# Patient Record
Sex: FEMALE | Race: WHITE | Employment: OTHER | URBAN - METROPOLITAN AREA
[De-identification: names, ages, dates, MRNs, and addresses within clinical notes are randomized per-mention and may not be internally consistent; named-entity substitution may affect disease eponyms.]

---

## 2021-11-23 ENCOUNTER — OFFICE VISIT (OUTPATIENT)
Dept: SPORTS MEDICINE | Age: 76
End: 2021-11-23
Payer: MEDICARE

## 2021-11-23 VITALS — TEMPERATURE: 96.7 F | WEIGHT: 200 LBS | HEIGHT: 67 IN | BODY MASS INDEX: 31.39 KG/M2

## 2021-11-23 DIAGNOSIS — M75.82 TENDINITIS OF LEFT ROTATOR CUFF: ICD-10-CM

## 2021-11-23 DIAGNOSIS — M75.42 IMPINGEMENT SYNDROME OF LEFT SHOULDER: Primary | ICD-10-CM

## 2021-11-23 PROCEDURE — G8484 FLU IMMUNIZE NO ADMIN: HCPCS | Performed by: FAMILY MEDICINE

## 2021-11-23 PROCEDURE — 4004F PT TOBACCO SCREEN RCVD TLK: CPT | Performed by: FAMILY MEDICINE

## 2021-11-23 PROCEDURE — 3017F COLORECTAL CA SCREEN DOC REV: CPT | Performed by: FAMILY MEDICINE

## 2021-11-23 PROCEDURE — G8400 PT W/DXA NO RESULTS DOC: HCPCS | Performed by: FAMILY MEDICINE

## 2021-11-23 PROCEDURE — 1090F PRES/ABSN URINE INCON ASSESS: CPT | Performed by: FAMILY MEDICINE

## 2021-11-23 PROCEDURE — 4040F PNEUMOC VAC/ADMIN/RCVD: CPT | Performed by: FAMILY MEDICINE

## 2021-11-23 PROCEDURE — 1123F ACP DISCUSS/DSCN MKR DOCD: CPT | Performed by: FAMILY MEDICINE

## 2021-11-23 PROCEDURE — G8417 CALC BMI ABV UP PARAM F/U: HCPCS | Performed by: FAMILY MEDICINE

## 2021-11-23 PROCEDURE — 99204 OFFICE O/P NEW MOD 45 MIN: CPT | Performed by: FAMILY MEDICINE

## 2021-11-23 PROCEDURE — G8427 DOCREV CUR MEDS BY ELIG CLIN: HCPCS | Performed by: FAMILY MEDICINE

## 2021-11-23 RX ORDER — SIMVASTATIN 20 MG
20 TABLET ORAL DAILY
COMMUNITY
Start: 2021-10-05

## 2021-11-23 RX ORDER — HYDROCHLOROTHIAZIDE 12.5 MG/1
12.5 CAPSULE, GELATIN COATED ORAL DAILY
COMMUNITY
Start: 2021-07-26

## 2021-11-23 RX ORDER — LISINOPRIL AND HYDROCHLOROTHIAZIDE 12.5; 1 MG/1; MG/1
TABLET ORAL
COMMUNITY
Start: 2021-09-07

## 2021-11-23 RX ORDER — AMINO ACIDS/MV,IRON,MIN
TABLET ORAL
COMMUNITY

## 2021-11-23 ASSESSMENT — ENCOUNTER SYMPTOMS
SHORTNESS OF BREATH: 0
BACK PAIN: 0
CONSTIPATION: 0
NAUSEA: 0
DIARRHEA: 0

## 2021-11-23 NOTE — PROGRESS NOTES
[facility]) Physicians  Neurosurgery and Pain [facility]  [address], [address] [facility] , [address] [facility], [place] [address]: [phone]  F: [phone]      Helena Singh  ([birth year])    11/23/2021    Subjective:     Helena Singh is 77 y.o. female who complains today of:    Chief Complaint   Patient presents with    Shoulder Pain     Left Shoulder Pain       HPI     Patient comes in with chief complaint of left shoulder pain she does not recall doing anything still bothering her for about a month or 2 she had a similar issue and the other side and significant help with therapy and is wondering what other options she has she has recently gone through chemotherapy and radiation for non-Hodgkin's lymphoma and since she has had a complete cure  Allergies:  Latex, Enbucrilate, and Other    No past medical history on file. No past surgical history on file. No family history on file. Social History     Socioeconomic History    Marital status: Not on file     Spouse name: Not on file    Number of children: Not on file    Years of education: Not on file    Highest education level: Not on file   Occupational History    Not on file   Tobacco Use    Smoking status: Not on file    Smokeless tobacco: Not on file   Substance and Sexual Activity    Alcohol use: Not on file    Drug use: Not on file    Sexual activity: Not on file   Other Topics Concern    Not on file   Social History Narrative    Not on file     Social Determinants of Health     Financial Resource Strain:     Difficulty of Paying Living Expenses: Not on file   Food Insecurity:     Worried About Running Out of Food in the Last Year: Not on file    Luly of Food in the Last Year: Not on file   Transportation Needs:     Lack of Transportation (Medical): Not on file    Lack of Transportation (Non-Medical):  Not on file   Physical Activity:     Days of Exercise per Week: Not on file    Minutes of Exercise per Session: Not on file   Stress:     Feeling of Stress : Not on file   Social Connections:     Frequency of Communication with Friends and Family: Not on file    Frequency of Social Gatherings with Friends and Family: Not on file    Attends Spiritism Services: Not on file    Active Member of Clubs or Organizations: Not on file    Attends Club or Organization Meetings: Not on file    Marital Status: Not on file   Intimate Partner Violence:     Fear of Current or Ex-Partner: Not on file    Emotionally Abused: Not on file    Physically Abused: Not on file    Sexually Abused: Not on file   Housing Stability:     Unable to Pay for Housing in the Last Year: Not on file    Number of [place] in the Last Year: Not on file    Unstable Housing in the Last Year: Not on file       Current Outpatient Medications on File Prior to Visit   Medication Sig Dispense Refill    lisinopril-hydroCHLOROthiazide (PRINZIDE;ZESTORETIC) 10-12.5 MG per tablet       hydroCHLOROthiazide (MICROZIDE) 12.5 MG capsule Take 12.5 mg by mouth daily      sertraline (ZOLOFT) 50 MG tablet       simvastatin (ZOCOR) 20 MG tablet Take 20 mg by mouth daily      Multiple Vitamins-Minerals (OCUVITE EXTRA) TABS        No current facility-administered medications on file prior to visit. Review of Systems   Constitutional: Negative for fever. HENT: Negative for hearing loss. Respiratory: Negative for shortness of breath. Gastrointestinal: Negative for constipation, diarrhea and nausea. Genitourinary: Negative for difficulty urinating. Musculoskeletal: Negative for back pain and neck pain. Skin: Negative for rash. Neurological: Negative for headaches. Hematological: Does not bruise/bleed easily. Psychiatric/Behavioral: Negative for sleep disturbance.          Objective:     Vitals:  Temp 96.7 °F (35.9 °C) (Infrared)   Ht 5' 7\" (1.702 m)   Wt 200 lb (90.7 kg)   BMI 31.32 kg/m² Pain Score:   5      Physical

## 2021-12-14 ENCOUNTER — OFFICE VISIT (OUTPATIENT)
Dept: SPORTS MEDICINE | Age: 76
End: 2021-12-14
Payer: MEDICARE

## 2021-12-14 VITALS — BODY MASS INDEX: 31.39 KG/M2 | WEIGHT: 200 LBS | HEIGHT: 67 IN | TEMPERATURE: 96.4 F

## 2021-12-14 DIAGNOSIS — M75.42 IMPINGEMENT SYNDROME OF LEFT SHOULDER: Primary | ICD-10-CM

## 2021-12-14 PROCEDURE — G8400 PT W/DXA NO RESULTS DOC: HCPCS | Performed by: FAMILY MEDICINE

## 2021-12-14 PROCEDURE — 1036F TOBACCO NON-USER: CPT | Performed by: FAMILY MEDICINE

## 2021-12-14 PROCEDURE — G8484 FLU IMMUNIZE NO ADMIN: HCPCS | Performed by: FAMILY MEDICINE

## 2021-12-14 PROCEDURE — 99213 OFFICE O/P EST LOW 20 MIN: CPT | Performed by: FAMILY MEDICINE

## 2021-12-14 PROCEDURE — 3017F COLORECTAL CA SCREEN DOC REV: CPT | Performed by: FAMILY MEDICINE

## 2021-12-14 PROCEDURE — 4040F PNEUMOC VAC/ADMIN/RCVD: CPT | Performed by: FAMILY MEDICINE

## 2021-12-14 PROCEDURE — 1090F PRES/ABSN URINE INCON ASSESS: CPT | Performed by: FAMILY MEDICINE

## 2021-12-14 PROCEDURE — 1123F ACP DISCUSS/DSCN MKR DOCD: CPT | Performed by: FAMILY MEDICINE

## 2021-12-14 PROCEDURE — G8427 DOCREV CUR MEDS BY ELIG CLIN: HCPCS | Performed by: FAMILY MEDICINE

## 2021-12-14 PROCEDURE — G8417 CALC BMI ABV UP PARAM F/U: HCPCS | Performed by: FAMILY MEDICINE

## 2021-12-14 RX ORDER — POLYETHYLENE GLYCOL 3350 17 G/17G
POWDER, FOR SOLUTION ORAL
COMMUNITY

## 2021-12-14 RX ORDER — CHLORAL HYDRATE 500 MG
CAPSULE ORAL
COMMUNITY

## 2021-12-14 RX ORDER — CHOLECALCIFEROL (VITAMIN D3) 10(400)/ML
DROPS ORAL
COMMUNITY

## 2021-12-14 ASSESSMENT — ENCOUNTER SYMPTOMS
SHORTNESS OF BREATH: 0
NAUSEA: 0
CONSTIPATION: 0
DIARRHEA: 0
BACK PAIN: 0

## 2021-12-14 NOTE — PROGRESS NOTES
[facility]) Physicians  Neurosurgery and Pain [facility]  [address] [facility] [address] [facility] , [address] [facility], [place] [address]: [phone]  F: [phone]      Carlos Morse  ()    2021    Subjective:     Carlos Morse is 77 y.o. female who complains today of:    Chief Complaint   Patient presents with    Follow-up     Left Shoulder Pain       HPI     Patient returns stating that her shoulder does feel better however it has been a bit delayed in improvement secondary to the fact that she got shingles since last time she saw sauce and it was up into her neck which bothers her shoulder somewhat says despite that her shoulder still seems to be improving she is trying to do her exercises she can do to help it  Allergies:  Latex, Enbucrilate, and Other    Past Medical History:   Diagnosis Date    Arthritis     Cancer ([address]     Depression     Hemorrhoids     Hypertension     Neuropathy      Past Surgical History:   Procedure Laterality Date    JOINT REPLACEMENT      KNEE SURGERY       Family History   Problem Relation Age of Onset    Arthritis Mother     Hypertension Mother     Arthritis Father     Hypertension Father      Social History     Socioeconomic History    Marital status:      Spouse name: Not on file    Number of children: Not on file    Years of education: Not on file    Highest education level: Not on file   Occupational History    Not on file   Tobacco Use    Smoking status: Former Smoker     Years: 3.00     Types: Cigarettes     Start date:      Quit date:      Years since quittin.9    Smokeless tobacco: Never Used   Vaping Use    Vaping Use: Never used   Substance and Sexual Activity    Alcohol use:  Yes     Alcohol/week: 3.0 - 4.0 standard drinks     Types: 3 - 4 Glasses of wine per week    Drug use: Never    Sexual activity: Not on file   Other Topics Concern    Not on file   Social History Narrative    Not on file Social Determinants of Health     Financial Resource Strain:     Difficulty of Paying Living Expenses: Not on file   Food Insecurity:     Worried About Running Out of Food in the Last Year: Not on file    Luly of Food in the Last Year: Not on file   Transportation Needs:     Lack of Transportation (Medical): Not on file    Lack of Transportation (Non-Medical): Not on file   Physical Activity:     Days of Exercise per Week: Not on file    Minutes of Exercise per Session: Not on file   Stress:     Feeling of Stress : Not on file   Social Connections:     Frequency of Communication with Friends and Family: Not on file    Frequency of Social Gatherings with Friends and Family: Not on file    Attends Worship Services: Not on file    Active Member of [address] [place] [address] [employer] or Organizations: Not on file    Attends Club or Organization Meetings: Not on file    Marital Status: Not on file   Intimate Partner Violence:     Fear of Current or Ex-Partner: Not on file    Emotionally Abused: Not on file    Physically Abused: Not on file    Sexually Abused: Not on file   Housing Stability:     Unable to Pay for Housing in the Last Year: Not on file    Number of [place] in the Last Year: Not on file    Unstable Housing in the Last Year: Not on file       Current Outpatient Medications on File Prior to Visit   Medication Sig Dispense Refill    Cholecalciferol (VITAMIN D) 10 MCG/ML LIQD       Omega-3 Fatty Acids (FISH OIL) 1000 MG CAPS       polyethylene glycol (MIRALAX) 17 g packet       lisinopril-hydroCHLOROthiazide (PRINZIDE;ZESTORETIC) 10-12.5 MG per tablet       hydroCHLOROthiazide (MICROZIDE) 12.5 MG capsule Take 12.5 mg by mouth daily      sertraline (ZOLOFT) 50 MG tablet       simvastatin (ZOCOR) 20 MG tablet Take 20 mg by mouth daily      Multiple Vitamins-Minerals (OCUVITE EXTRA) TABS        No current facility-administered medications on file prior to visit.          Review of Systems Constitutional: Negative for fever. HENT: Negative for hearing loss. Respiratory: Negative for shortness of breath. Gastrointestinal: Negative for constipation, diarrhea and nausea. Genitourinary: Negative for difficulty urinating. Musculoskeletal: Negative for back pain and neck pain. Skin: Negative for rash. Neurological: Negative for headaches. Hematological: Does not bruise/bleed easily. Psychiatric/Behavioral: Negative for sleep disturbance. Objective:     Vitals:  Temp 96.4 °F (35.8 °C) (Infrared)   Ht 5' 7\" (1.702 m)   Wt 200 lb (90.7 kg)   BMI 31.32 kg/m² Pain Score:   4      Physical Exam  Vitals reviewed. Constitutional:       Appearance: Normal appearance. Skin:     General: Skin is warm and dry. Neurological:      Mental Status: She is alert. Ortho Exam   Exam of the left shoulder revealed near full range of motion equivocal impingement signs equivocal supraspinatus signs neurovascular muscle status was intact rest the rotator cuff was intact  Assessment:      Diagnosis Orders   1. Impingement syndrome of left shoulder         Plan:   Patient has shown some good improvement despite having the shingles letter continue her exercises to her tolerance and see her back in about 4 weeks       No orders of the defined types were placed in this encounter. No orders of the defined types were placed in this encounter. Follow up:  Return in about 4 weeks (around 1/11/2022).     CADEN JOSEPH DO

## 2022-01-18 ENCOUNTER — OFFICE VISIT (OUTPATIENT)
Dept: SPORTS MEDICINE | Age: 77
End: 2022-01-18
Payer: MEDICARE

## 2022-01-18 VITALS — TEMPERATURE: 96.9 F | WEIGHT: 200 LBS | BODY MASS INDEX: 31.39 KG/M2 | HEIGHT: 67 IN

## 2022-01-18 DIAGNOSIS — M25.372 LEFT ANKLE INSTABILITY: ICD-10-CM

## 2022-01-18 DIAGNOSIS — M75.42 IMPINGEMENT SYNDROME OF LEFT SHOULDER: Primary | ICD-10-CM

## 2022-01-18 DIAGNOSIS — M75.82 TENDINITIS OF LEFT ROTATOR CUFF: ICD-10-CM

## 2022-01-18 PROCEDURE — 4040F PNEUMOC VAC/ADMIN/RCVD: CPT | Performed by: FAMILY MEDICINE

## 2022-01-18 PROCEDURE — 3017F COLORECTAL CA SCREEN DOC REV: CPT | Performed by: FAMILY MEDICINE

## 2022-01-18 PROCEDURE — G8484 FLU IMMUNIZE NO ADMIN: HCPCS | Performed by: FAMILY MEDICINE

## 2022-01-18 PROCEDURE — G8427 DOCREV CUR MEDS BY ELIG CLIN: HCPCS | Performed by: FAMILY MEDICINE

## 2022-01-18 PROCEDURE — 99213 OFFICE O/P EST LOW 20 MIN: CPT | Performed by: FAMILY MEDICINE

## 2022-01-18 PROCEDURE — 1123F ACP DISCUSS/DSCN MKR DOCD: CPT | Performed by: FAMILY MEDICINE

## 2022-01-18 PROCEDURE — G8417 CALC BMI ABV UP PARAM F/U: HCPCS | Performed by: FAMILY MEDICINE

## 2022-01-18 PROCEDURE — 1036F TOBACCO NON-USER: CPT | Performed by: FAMILY MEDICINE

## 2022-01-18 PROCEDURE — 1090F PRES/ABSN URINE INCON ASSESS: CPT | Performed by: FAMILY MEDICINE

## 2022-01-18 PROCEDURE — G8400 PT W/DXA NO RESULTS DOC: HCPCS | Performed by: FAMILY MEDICINE

## 2022-01-18 RX ORDER — OMEPRAZOLE 10 MG/1
20 CAPSULE, DELAYED RELEASE ORAL DAILY
COMMUNITY
Start: 2022-01-10 | End: 2022-02-09

## 2022-01-18 ASSESSMENT — ENCOUNTER SYMPTOMS
BACK PAIN: 0
SHORTNESS OF BREATH: 0
DIARRHEA: 0
CONSTIPATION: 0
NAUSEA: 0

## 2022-01-18 NOTE — PROGRESS NOTES
[facility]) Physicians  Neurosurgery and Pain [facility]  [address], [address] [facility] , [address] [facility], [place] [address]: [phone]  F: [phone]      Zana Gaming  ()    2022    Subjective:     Zana Gaming is 77 y.o. female who complains today of:    Chief Complaint   Patient presents with    Follow-up     Left Shoulder Pain       HPI     Patient returns stating that her left shoulder is doing a lot better however she is having problems with her left ankle she had been prescribed AFOs years ago and she thinks maybe they are not working as well as they were before she also had bilateral total knees done and she thinks it may have thrown off her gait she says she is also had neuropathy from chemotherapy and she is aware it been working on balancing and therapy for the last year or so but still having difficulty  Allergies:  Latex, Enbucrilate, and Other    Past Medical History:   Diagnosis Date    Arthritis     [address] [address]     Depression     Hemorrhoids     Hypertension     Neuropathy      Past Surgical History:   Procedure Laterality Date    JOINT REPLACEMENT      KNEE SURGERY       Family History   Problem Relation Age of Onset    Arthritis Mother     Hypertension Mother     Arthritis Father     Hypertension Father      Social History     Socioeconomic History    Marital status:      Spouse name: Not on file    Number of children: Not on file    Years of education: Not on file    Highest education level: Not on file   Occupational History    Not on file   Tobacco Use    Smoking status: Former Smoker     Packs/day: 0.50     Years: 3.00     Pack years: 1.50     Types: Cigarettes     Start date:      Quit date:      Years since quittin.0    Smokeless tobacco: Never Used   Vaping Use    Vaping Use: Never used   Substance and Sexual Activity    Alcohol use:  Yes     Alcohol/week: 3.0 - 4.0 standard drinks     Types: 3 - 4 Glasses of wine per week    Drug use: Never    Sexual activity: Not on file   Other Topics Concern    Not on file   Social History Narrative    Not on file     Social Determinants of Health     Financial Resource Strain:     Difficulty of Paying Living Expenses: Not on file   Food Insecurity:     Worried About Running Out of Food in the Last Year: Not on file    Luly of Food in the Last Year: Not on file   Transportation Needs:     Lack of Transportation (Medical): Not on file    Lack of Transportation (Non-Medical):  Not on file   Physical Activity:     Days of Exercise per Week: Not on file    Minutes of Exercise per Session: Not on file   Stress:     Feeling of Stress : Not on file   Social Connections:     Frequency of Communication with Friends and Family: Not on file    Frequency of Social Gatherings with Friends and Family: Not on file    Attends Baptism Services: Not on file    Active Member of [address] [place] [address] or Organizations: Not on file    Attends Club or Organization Meetings: Not on file    Marital Status: Not on file   Intimate Partner Violence:     Fear of Current or Ex-Partner: Not on file    Emotionally Abused: Not on file    Physically Abused: Not on file    Sexually Abused: Not on file   Housing Stability:     Unable to Pay for Housing in the Last Year: Not on file    Number of [place] in the Last Year: Not on file    Unstable Housing in the Last Year: Not on file       Current Outpatient Medications on File Prior to Visit   Medication Sig Dispense Refill    omeprazole (PRILOSEC) 10 MG delayed release capsule Take 20 mg by mouth daily      Cholecalciferol (VITAMIN D) 10 MCG/ML LIQD       Omega-3 Fatty Acids (FISH OIL) 1000 MG CAPS       polyethylene glycol (MIRALAX) 17 g packet       lisinopril-hydroCHLOROthiazide (PRINZIDE;ZESTORETIC) 10-12.5 MG per tablet       hydroCHLOROthiazide (MICROZIDE) 12.5 MG capsule Take 12.5 mg by mouth daily      sertraline (ZOLOFT) 50 MG tablet  simvastatin (ZOCOR) 20 MG tablet Take 20 mg by mouth daily      Multiple Vitamins-Minerals (OCUVITE EXTRA) TABS        No current facility-administered medications on file prior to visit. Review of Systems   Constitutional: Negative for fever. HENT: Negative for hearing loss. Respiratory: Negative for shortness of breath. Gastrointestinal: Negative for constipation, diarrhea and nausea. Genitourinary: Negative for difficulty urinating. Musculoskeletal: Negative for back pain and neck pain. Skin: Negative for rash. Neurological: Negative for headaches. Hematological: Does not bruise/bleed easily. Psychiatric/Behavioral: Negative for sleep disturbance. Objective:     Vitals:  Temp 96.9 °F (36.1 °C) (Infrared)   Ht 5' 7\" (1.702 m)   Wt 200 lb (90.7 kg)   BMI 31.32 kg/m² Pain Score:   2      Physical Exam  Vitals reviewed. Constitutional:       Appearance: Normal appearance. Skin:     General: Skin is warm and dry. Neurological:      Mental Status: She is alert. Ortho Exam   Examination of the left shoulder reveals near full range of motion no palpable tenderness rotator cuffs intact negative impingement signs negative [place] sign  Examination of the left ankle reveals some tenderness along the lateral aspect mediocre range of motion neurovascular motor status is intact there is a valgus laxity which is only moderately corrected with her orthotics in place    Assessment:      Diagnosis Orders   1. Impingement syndrome of left shoulder     2. Tendinitis of left rotator cuff     3.  Left ankle instability         Plan:      Regarding the shoulder the patient is doing well and continuing her exercise program  Regarding the ankle sending the patient back to the orthotist to see if they can give her some better correction so take some pressure off the lateral ankle and also help her with her gait she does have a valgus knee on that side which I think is also contributed to the issue so we will start there and also consider lipid therapy depending on how much we can get done with these AFOs may also consider a unloading brace at the knee  No orders of the defined types were placed in this encounter. No orders of the defined types were placed in this encounter. Follow up:  No follow-ups on file.     CADEN JOSEPH, DO

## 2022-01-20 DIAGNOSIS — M25.371 INSTABILITY OF JOINTS OF BOTH ANKLES: Primary | ICD-10-CM

## 2022-01-20 DIAGNOSIS — M25.372 INSTABILITY OF JOINTS OF BOTH ANKLES: Primary | ICD-10-CM

## 2022-02-15 ENCOUNTER — OFFICE VISIT (OUTPATIENT)
Dept: SPORTS MEDICINE | Age: 77
End: 2022-02-15
Payer: MEDICARE

## 2022-02-15 VITALS — WEIGHT: 200 LBS | HEIGHT: 67 IN | BODY MASS INDEX: 31.39 KG/M2 | TEMPERATURE: 96.4 F

## 2022-02-15 DIAGNOSIS — M47.817 DJD (DEGENERATIVE JOINT DISEASE), LUMBOSACRAL: Primary | ICD-10-CM

## 2022-02-15 DIAGNOSIS — S46.011A TRAUMATIC INCOMPLETE TEAR OF RIGHT ROTATOR CUFF, INITIAL ENCOUNTER: ICD-10-CM

## 2022-02-15 PROCEDURE — G8417 CALC BMI ABV UP PARAM F/U: HCPCS | Performed by: FAMILY MEDICINE

## 2022-02-15 PROCEDURE — G8484 FLU IMMUNIZE NO ADMIN: HCPCS | Performed by: FAMILY MEDICINE

## 2022-02-15 PROCEDURE — G8400 PT W/DXA NO RESULTS DOC: HCPCS | Performed by: FAMILY MEDICINE

## 2022-02-15 PROCEDURE — 4040F PNEUMOC VAC/ADMIN/RCVD: CPT | Performed by: FAMILY MEDICINE

## 2022-02-15 PROCEDURE — 1036F TOBACCO NON-USER: CPT | Performed by: FAMILY MEDICINE

## 2022-02-15 PROCEDURE — 99214 OFFICE O/P EST MOD 30 MIN: CPT | Performed by: FAMILY MEDICINE

## 2022-02-15 PROCEDURE — G8427 DOCREV CUR MEDS BY ELIG CLIN: HCPCS | Performed by: FAMILY MEDICINE

## 2022-02-15 PROCEDURE — 3017F COLORECTAL CA SCREEN DOC REV: CPT | Performed by: FAMILY MEDICINE

## 2022-02-15 PROCEDURE — 1090F PRES/ABSN URINE INCON ASSESS: CPT | Performed by: FAMILY MEDICINE

## 2022-02-15 PROCEDURE — 1123F ACP DISCUSS/DSCN MKR DOCD: CPT | Performed by: FAMILY MEDICINE

## 2022-02-15 ASSESSMENT — ENCOUNTER SYMPTOMS
CONSTIPATION: 0
DIARRHEA: 0
NAUSEA: 0
SHORTNESS OF BREATH: 0
BACK PAIN: 0

## 2022-02-15 NOTE — PROGRESS NOTES
[facility]) Physicians  Neurosurgery and Pain [facility]  [address], [address] [facility] , [address] [facility], [place] [address]: [phone]  F: [phone]      Erica Muniz  ()    2/15/2022    Subjective:     Erica Muniz is 77 y.o. female who complains today of:    Chief Complaint   Patient presents with    Follow-up     Bilateral Shoulder Pain - Left Ankle Pain       HPI     Returns stating that she still working on her ankle braces however in the interim she is irritated her shoulders she has been working on the left feels good but the right ones lagging behind she also states that she is feeling some weakness in her back and thinks she is irritated her lumbar spine she feels little weak on the right thigh  Allergies:  Latex, Enbucrilate, and Other    Past Medical History:   Diagnosis Date    Arthritis     Cancer ([address]     Depression     Hemorrhoids     Hypertension     Neuropathy      Past Surgical History:   Procedure Laterality Date    JOINT REPLACEMENT      KNEE SURGERY       Family History   Problem Relation Age of Onset    Arthritis Mother     Hypertension Mother     Arthritis Father     Hypertension Father      Social History     Socioeconomic History    Marital status:      Spouse name: Not on file    Number of children: Not on file    Years of education: Not on file    Highest education level: Not on file   Occupational History    Not on file   Tobacco Use    Smoking status: Former Smoker     Packs/day: 0.50     Years: 3.00     Pack years: 1.50     Types: Cigarettes     Start date:      Quit date:      Years since quittin.1    Smokeless tobacco: Never Used   Vaping Use    Vaping Use: Never used   Substance and Sexual Activity    Alcohol use:  Yes     Alcohol/week: 3.0 - 4.0 standard drinks     Types: 3 - 4 Glasses of wine per week    Drug use: Never    Sexual activity: Not on file   Other Topics Concern    Not on file   Social History Narrative    Not on file     Social Determinants of Health     Financial Resource Strain:     Difficulty of Paying Living Expenses: Not on file   Food Insecurity:     Worried About Running Out of Food in the Last Year: Not on file    Luly of Food in the Last Year: Not on file   Transportation Needs:     Lack of Transportation (Medical): Not on file    Lack of Transportation (Non-Medical):  Not on file   Physical Activity:     Days of Exercise per Week: Not on file    Minutes of Exercise per Session: Not on file   Stress:     Feeling of Stress : Not on file   Social Connections:     Frequency of Communication with Friends and Family: Not on file    Frequency of Social Gatherings with Friends and Family: Not on file    Attends Mu-ism Services: Not on file    Active Member of [address] [place] [address] [employer] or Organizations: Not on file    Attends Club or Organization Meetings: Not on file    Marital Status: Not on file   Intimate Partner Violence:     Fear of Current or Ex-Partner: Not on file    Emotionally Abused: Not on file    Physically Abused: Not on file    Sexually Abused: Not on file   Housing Stability:     Unable to Pay for Housing in the Last Year: Not on file    Number of [place] in the Last Year: Not on file    Unstable Housing in the Last Year: Not on file       Current Outpatient Medications on File Prior to Visit   Medication Sig Dispense Refill    omeprazole (PRILOSEC) 10 MG delayed release capsule Take 20 mg by mouth daily      Cholecalciferol (VITAMIN D) 10 MCG/ML LIQD       Omega-3 Fatty Acids (FISH OIL) 1000 MG CAPS       polyethylene glycol (MIRALAX) 17 g packet       lisinopril-hydroCHLOROthiazide (PRINZIDE;ZESTORETIC) 10-12.5 MG per tablet       hydroCHLOROthiazide (MICROZIDE) 12.5 MG capsule Take 12.5 mg by mouth daily      sertraline (ZOLOFT) 50 MG tablet       simvastatin (ZOCOR) 20 MG tablet Take 20 mg by mouth daily      Multiple Vitamins-Minerals (OCUVITE EXTRA) TABS        No current facility-administered medications on file prior to visit. Review of Systems   Constitutional: Negative for fever. HENT: Negative for hearing loss. Respiratory: Negative for shortness of breath. Gastrointestinal: Negative for constipation, diarrhea and nausea. Genitourinary: Negative for difficulty urinating. Musculoskeletal: Negative for back pain and neck pain. Skin: Negative for rash. Neurological: Negative for headaches. Hematological: Does not bruise/bleed easily. Psychiatric/Behavioral: Negative for sleep disturbance. Objective:     Vitals:  Temp 96.4 °F (35.8 °C) (Infrared)   Ht 5' 7\" (1.702 m)   Wt 200 lb (90.7 kg)   BMI 31.32 kg/m² Pain Score:   3      Physical Exam  Vitals reviewed. Constitutional:       Appearance: Normal appearance. Skin:     General: Skin is warm and dry. Neurological:      Mental Status: She is alert. Ortho Exam   Examination of the lumbar spine with the neurovascular muscular status to be intact other than mild weakness of the right quad patient had near full range of motion no tension signs palpable tenderness L5-S1  Examination of the right shoulder revealed the neurovascular muscular status to be intact there is positive supraspinatus signs near full range of motion positive impingement signs positive McDowell sign    I reviewed x-rays from previous provider    Assessment:      Diagnosis Orders   1. DJD (degenerative joint disease), lumbosacral  Ambulatory referral to Physical Therapy   2.  Traumatic incomplete tear of right rotator cuff, initial encounter  Ambulatory referral to Physical Therapy       Plan:   Regarding the lumbar spine and have the patient get x-rays and started on physical therapy regarding the shoulder when started on therapy for the right I encouraged her to continue her exercises on the left and see her back in 4 weeks if she is no better consider further testing       No orders of the defined types were placed in this encounter. Orders Placed This Encounter   Procedures    Ambulatory referral to Physical Therapy     Referral Priority:   Routine     Referral Type:   Physical Medicine     Referral Reason:   Patient Preference     Requested Specialty:   Physical Therapy     Number of Visits Requested:   1         Follow up:  Return in about 4 weeks (around 3/15/2022).     CADEN JOSEPH DO

## 2022-03-15 ENCOUNTER — OFFICE VISIT (OUTPATIENT)
Dept: SPORTS MEDICINE | Age: 77
End: 2022-03-15
Payer: MEDICARE

## 2022-03-15 VITALS
SYSTOLIC BLOOD PRESSURE: 118 MMHG | TEMPERATURE: 96.8 F | DIASTOLIC BLOOD PRESSURE: 84 MMHG | HEIGHT: 67 IN | WEIGHT: 200 LBS | BODY MASS INDEX: 31.39 KG/M2

## 2022-03-15 DIAGNOSIS — M25.372 LEFT ANKLE INSTABILITY: ICD-10-CM

## 2022-03-15 DIAGNOSIS — M47.817 DJD (DEGENERATIVE JOINT DISEASE), LUMBOSACRAL: Primary | ICD-10-CM

## 2022-03-15 DIAGNOSIS — S46.011A TRAUMATIC INCOMPLETE TEAR OF RIGHT ROTATOR CUFF, INITIAL ENCOUNTER: ICD-10-CM

## 2022-03-15 PROCEDURE — 1123F ACP DISCUSS/DSCN MKR DOCD: CPT | Performed by: FAMILY MEDICINE

## 2022-03-15 PROCEDURE — G8484 FLU IMMUNIZE NO ADMIN: HCPCS | Performed by: FAMILY MEDICINE

## 2022-03-15 PROCEDURE — G8400 PT W/DXA NO RESULTS DOC: HCPCS | Performed by: FAMILY MEDICINE

## 2022-03-15 PROCEDURE — G8427 DOCREV CUR MEDS BY ELIG CLIN: HCPCS | Performed by: FAMILY MEDICINE

## 2022-03-15 PROCEDURE — 99213 OFFICE O/P EST LOW 20 MIN: CPT | Performed by: FAMILY MEDICINE

## 2022-03-15 PROCEDURE — G8417 CALC BMI ABV UP PARAM F/U: HCPCS | Performed by: FAMILY MEDICINE

## 2022-03-15 PROCEDURE — 4040F PNEUMOC VAC/ADMIN/RCVD: CPT | Performed by: FAMILY MEDICINE

## 2022-03-15 PROCEDURE — 1090F PRES/ABSN URINE INCON ASSESS: CPT | Performed by: FAMILY MEDICINE

## 2022-03-15 PROCEDURE — 1036F TOBACCO NON-USER: CPT | Performed by: FAMILY MEDICINE

## 2022-03-15 PROCEDURE — 3017F COLORECTAL CA SCREEN DOC REV: CPT | Performed by: FAMILY MEDICINE

## 2022-03-15 RX ORDER — PHENOL 1.4 %
1 AEROSOL, SPRAY (ML) MUCOUS MEMBRANE 2 TIMES DAILY PRN
COMMUNITY

## 2022-03-15 ASSESSMENT — ENCOUNTER SYMPTOMS
DIARRHEA: 0
SHORTNESS OF BREATH: 0
NAUSEA: 0
CONSTIPATION: 0
BACK PAIN: 0

## 2022-03-15 NOTE — PROGRESS NOTES
[address]  Physicians  Neurosurgery and Pain [place] [id number] [facility], [address] [facility] , [address] [facility], [place]: [phone]  F: [phone]      Kristina Delgado  ([id number])    3/15/2022    Subjective:     Kristina Delgado is 77 y.o. female who complains today of:    Chief Complaint   Patient presents with    Follow-up     Lower Back Pain - PT for Right Shoulder       HPI     Patient returns stating that she is improving her back and shoulder still lagging a bit in the back still waiting for her orthotics to be made for her ankle  Allergies:  Latex, Enbucrilate, and Other    Past Medical History:   Diagnosis Date    Arthritis     Cancer ([facility] [address]     Depression     Hemorrhoids     Hypertension     Neuropathy      Past Surgical History:   Procedure Laterality Date    JOINT REPLACEMENT      KNEE SURGERY       Family History   Problem Relation Age of Onset    Arthritis Mother     Hypertension Mother     Arthritis Father     Hypertension Father      Social History     Socioeconomic History    Marital status:      Spouse name: Not on file    Number of children: Not on file    Years of education: Not on file    Highest education level: Not on file   Occupational History    Not on file   Tobacco Use    Smoking status: Former Smoker     Packs/day: 0.50     Years: 3.00     Pack years: 1.50     Types: Cigarettes     Start date:      Quit date:      Years since quittin.1    Smokeless tobacco: Never Used   Vaping Use    Vaping Use: Never used   Substance and Sexual Activity    Alcohol use:  Yes     Alcohol/week: 3.0 - 4.0 standard drinks     Types: 3 - 4 Glasses of wine per week    Drug use: Never    Sexual activity: Not on file   Other Topics Concern    Not on file   Social History Narrative    Not on file     Social Determinants of Health     Financial Resource Strain:     Difficulty of Paying Living Expenses: Not on file   Food Insecurity:     Worried About [facility] in the Last Year: Not on file    Luly of Food in the Last Year: Not on file   Transportation Needs:     Lack of Transportation (Medical): Not on file    Lack of Transportation (Non-Medical):  Not on file   Physical Activity:     Days of Exercise per Week: Not on file    Minutes of Exercise per Session: Not on file   Stress:     Feeling of Stress : Not on file   Social Connections:     Frequency of Communication with Friends and Family: Not on file    Frequency of Social Gatherings with Friends and Family: Not on file    Attends Yazidi Services: Not on file    Active Member of [address] [place] [address] or Organizations: Not on file    Attends Club or Organization Meetings: Not on file    Marital Status: Not on file   Intimate Partner Violence:     Fear of Current or Ex-Partner: Not on file    Emotionally Abused: Not on file    Physically Abused: Not on file    Sexually Abused: Not on file   Housing Stability:     Unable to Pay for Housing in the Last Year: Not on file    Number of [place] in the Last Year: Not on file    Unstable Housing in the Last Year: Not on file       Current Outpatient Medications on File Prior to Visit   Medication Sig Dispense Refill    calcium carbonate 600 MG TABS tablet Take 1 tablet by mouth 2 times daily as needed      Cholecalciferol (VITAMIN D) 10 MCG/ML LIQD       Omega-3 Fatty Acids (FISH OIL) 1000 MG CAPS       polyethylene glycol (MIRALAX) 17 g packet       lisinopril-hydroCHLOROthiazide (PRINZIDE;ZESTORETIC) 10-12.5 MG per tablet       hydroCHLOROthiazide (MICROZIDE) 12.5 MG capsule Take 12.5 mg by mouth daily      sertraline (ZOLOFT) 50 MG tablet       Multiple Vitamins-Minerals (OCUVITE EXTRA) TABS       omeprazole (PRILOSEC) 10 MG delayed release capsule Take 20 mg by mouth daily      simvastatin (ZOCOR) 20 MG tablet Take 20 mg by mouth daily (Patient not taking: Reported on 3/15/2022)       No current facility-administered medications on file prior to visit. Review of Systems   Constitutional: Negative for fever. HENT: Negative for hearing loss. Respiratory: Negative for shortness of breath. Gastrointestinal: Negative for constipation, diarrhea and nausea. Genitourinary: Negative for difficulty urinating. Musculoskeletal: Negative for back pain and neck pain. Skin: Negative for rash. Neurological: Negative for headaches. Hematological: Does not bruise/bleed easily. Psychiatric/Behavioral: Negative for sleep disturbance. Objective:     Vitals:  /84 (Site: Left Upper Arm)   Temp 96.8 °F (36 °C) (Infrared)   Ht 5' 7\" (1.702 m)   Wt 200 lb (90.7 kg)   BMI 31.32 kg/m² Pain Score:   2      Physical Exam  Vitals reviewed. Constitutional:       Appearance: Normal appearance. Skin:     General: Skin is warm and dry. Neurological:      Mental Status: She is alert. Ortho Exam   Examination of the right shoulder reveals near full range of motion still some equivocal supraspinatus signs neurovascular muscle status is intact rest of the rotator cuff is intact  Examination of the lumbar spine shows near full range of motion no palpable tenderness no tension signs    Assessment:      Diagnosis Orders   1. DJD (degenerative joint disease), lumbosacral     2. Traumatic incomplete tear of right rotator cuff, initial encounter     3. Left ankle instability         Plan:   Patient is improving at a good pace I want her to continue with her therapy and make sure she gets her AFOs made we will see her back for final check in 4 weeks and also assess the AFOs at that point       No orders of the defined types were placed in this encounter. No orders of the defined types were placed in this encounter. Follow up:  Return in about 4 weeks (around 4/12/2022).     CADEN JOSEPH DO

## 2022-03-18 ENCOUNTER — PATIENT MESSAGE (OUTPATIENT)
Dept: SPORTS MEDICINE | Age: 77
End: 2022-03-18

## 2022-03-21 NOTE — TELEPHONE ENCOUNTER
Daniela faxed xray report Lumbar spine to fax # [phone]. Keeps coming back busy, I let patient know to verify fax number.

## 2022-04-12 ENCOUNTER — OFFICE VISIT (OUTPATIENT)
Dept: SPORTS MEDICINE | Age: 77
End: 2022-04-12
Payer: MEDICARE

## 2022-04-12 VITALS
SYSTOLIC BLOOD PRESSURE: 122 MMHG | HEIGHT: 67 IN | WEIGHT: 200 LBS | BODY MASS INDEX: 31.39 KG/M2 | DIASTOLIC BLOOD PRESSURE: 86 MMHG | TEMPERATURE: 97.1 F

## 2022-04-12 DIAGNOSIS — M25.372 LEFT ANKLE INSTABILITY: Primary | ICD-10-CM

## 2022-04-12 DIAGNOSIS — S46.011A TRAUMATIC INCOMPLETE TEAR OF RIGHT ROTATOR CUFF, INITIAL ENCOUNTER: ICD-10-CM

## 2022-04-12 DIAGNOSIS — M47.817 DJD (DEGENERATIVE JOINT DISEASE), LUMBOSACRAL: ICD-10-CM

## 2022-04-12 PROCEDURE — 4040F PNEUMOC VAC/ADMIN/RCVD: CPT | Performed by: FAMILY MEDICINE

## 2022-04-12 PROCEDURE — 3017F COLORECTAL CA SCREEN DOC REV: CPT | Performed by: FAMILY MEDICINE

## 2022-04-12 PROCEDURE — 1090F PRES/ABSN URINE INCON ASSESS: CPT | Performed by: FAMILY MEDICINE

## 2022-04-12 PROCEDURE — 99213 OFFICE O/P EST LOW 20 MIN: CPT | Performed by: FAMILY MEDICINE

## 2022-04-12 PROCEDURE — G8427 DOCREV CUR MEDS BY ELIG CLIN: HCPCS | Performed by: FAMILY MEDICINE

## 2022-04-12 PROCEDURE — G8400 PT W/DXA NO RESULTS DOC: HCPCS | Performed by: FAMILY MEDICINE

## 2022-04-12 PROCEDURE — 1123F ACP DISCUSS/DSCN MKR DOCD: CPT | Performed by: FAMILY MEDICINE

## 2022-04-12 PROCEDURE — 1036F TOBACCO NON-USER: CPT | Performed by: FAMILY MEDICINE

## 2022-04-12 PROCEDURE — G8417 CALC BMI ABV UP PARAM F/U: HCPCS | Performed by: FAMILY MEDICINE

## 2022-04-12 ASSESSMENT — ENCOUNTER SYMPTOMS
CONSTIPATION: 0
DIARRHEA: 0
NAUSEA: 0
BACK PAIN: 0
SHORTNESS OF BREATH: 0

## 2022-05-23 NOTE — TELEPHONE ENCOUNTER
From: Vick Barnett  To: Dr. Ermelinda Mensah  Sent: 3/18/2022 3:40 PM EDT  Subject: Spinal x-ray    Hi Dr. Sofiya Reid,   I spoke with my PCP about the possible gall stone  that showed up on the x-ray. At his recommendation, I have made an appointment with Dr Nelson Martin, a general surgeon, in April. His office asked if you could fax the test results to their office. The fax number is [phone], attention Daisy Rai.     Thank you,   Vick Barnett prone

## 2022-09-13 ENCOUNTER — OFFICE VISIT (OUTPATIENT)
Dept: SPORTS MEDICINE | Age: 77
End: 2022-09-13
Payer: MEDICARE

## 2022-09-13 VITALS — HEIGHT: 67 IN | BODY MASS INDEX: 30.92 KG/M2 | TEMPERATURE: 96.7 F | WEIGHT: 197 LBS

## 2022-09-13 DIAGNOSIS — M25.372 LEFT ANKLE INSTABILITY: Primary | ICD-10-CM

## 2022-09-13 PROCEDURE — 1090F PRES/ABSN URINE INCON ASSESS: CPT | Performed by: FAMILY MEDICINE

## 2022-09-13 PROCEDURE — 99213 OFFICE O/P EST LOW 20 MIN: CPT | Performed by: FAMILY MEDICINE

## 2022-09-13 PROCEDURE — G8417 CALC BMI ABV UP PARAM F/U: HCPCS | Performed by: FAMILY MEDICINE

## 2022-09-13 PROCEDURE — 1123F ACP DISCUSS/DSCN MKR DOCD: CPT | Performed by: FAMILY MEDICINE

## 2022-09-13 PROCEDURE — G8400 PT W/DXA NO RESULTS DOC: HCPCS | Performed by: FAMILY MEDICINE

## 2022-09-13 PROCEDURE — G8427 DOCREV CUR MEDS BY ELIG CLIN: HCPCS | Performed by: FAMILY MEDICINE

## 2022-09-13 PROCEDURE — 1036F TOBACCO NON-USER: CPT | Performed by: FAMILY MEDICINE

## 2022-09-13 ASSESSMENT — ENCOUNTER SYMPTOMS
SHORTNESS OF BREATH: 0
BACK PAIN: 0
CONSTIPATION: 0
NAUSEA: 0
DIARRHEA: 0

## 2022-09-13 NOTE — PROGRESS NOTES
[facility]) Physicians  Neurosurgery and Pain [facility]  [address] [facility], [address] [facility] , [address] [facility], [place]: [phone]  F: [phone]      Galina Jacobson  ([id number])    2022    Subjective:     Galina Jacobson is 69 y.o. female who complains today of:    Chief Complaint   Patient presents with    Follow-up     L Ankle / Foot Pain       HPI     This patient returns stating that she has finally gotten fitted for her new braces but has not gotten them yet so she still having a little pain on the left lateral ankle that gets worse when she walks  Allergies:  Latex, Enbucrilate, and Other    Past Medical History:   Diagnosis Date    Arthritis     Cancer ([facility] [address]     Depression     Hemorrhoids     Hypertension     Neuropathy      Past Surgical History:   Procedure Laterality Date    JOINT REPLACEMENT      KNEE SURGERY       Family History   Problem Relation Age of Onset    Arthritis Mother     Hypertension Mother     Arthritis Father     Hypertension Father      Social History     Socioeconomic History    Marital status:      Spouse name: Not on file    Number of children: Not on file    Years of education: Not on file    Highest education level: Not on file   Occupational History    Not on file   Tobacco Use    Smoking status: Former     Packs/day: 0.50     Years: 3.00     Pack years: 1.50     Types: Cigarettes     Start date:      Quit date:      Years since quittin.7    Smokeless tobacco: Never   Vaping Use    Vaping Use: Never used   Substance and Sexual Activity    Alcohol use:  Yes     Alcohol/week: 3.0 - 4.0 standard drinks     Types: 3 - 4 Glasses of wine per week    Drug use: Never    Sexual activity: Not on file   Other Topics Concern    Not on file   Social History Narrative    Not on file     Social Determinants of Health     Financial Resource Strain: Not on file   Food Insecurity: Not on file   Transportation Needs: Not on file   Physical Activity: Not on file   Stress: Not on file   Social Connections: Not on file   Intimate Partner Violence: Not on file   Housing Stability: Not on file       Current Outpatient Medications on File Prior to Visit   Medication Sig Dispense Refill    calcium carbonate 600 MG TABS tablet Take 1 tablet by mouth 2 times daily as needed      Cholecalciferol (VITAMIN D) 10 MCG/ML LIQD       Omega-3 Fatty Acids (FISH OIL) 1000 MG CAPS       polyethylene glycol (GLYCOLAX) 17 g packet       lisinopril-hydroCHLOROthiazide (PRINZIDE;ZESTORETIC) 10-12.5 MG per tablet       hydroCHLOROthiazide (MICROZIDE) 12.5 MG capsule Take 12.5 mg by mouth daily      sertraline (ZOLOFT) 50 MG tablet       Multiple Vitamins-Minerals (OCUVITE EXTRA) TABS       omeprazole (PRILOSEC) 10 MG delayed release capsule Take 20 mg by mouth daily      simvastatin (ZOCOR) 20 MG tablet Take 20 mg by mouth daily (Patient not taking: No sig reported)       No current facility-administered medications on file prior to visit. Review of Systems   Constitutional:  Negative for fever. HENT:  Negative for hearing loss. Respiratory:  Negative for shortness of breath. Gastrointestinal:  Negative for constipation, diarrhea and nausea. Genitourinary:  Negative for difficulty urinating. Musculoskeletal:  Negative for back pain and neck pain. Skin:  Negative for rash. Neurological:  Negative for headaches. Hematological:  Does not bruise/bleed easily. Psychiatric/Behavioral:  Negative for sleep disturbance. Objective:     Vitals:  Temp (!) 96.7 °F (35.9 °C) (Infrared)   Ht 5' 7\" (1.702 m)   Wt 197 lb (89.4 kg)   BMI 30.85 kg/m² Pain Score:   4      Physical Exam  Vitals reviewed. Constitutional:       Appearance: Normal appearance. Skin:     General: Skin is warm and dry. Neurological:      Mental Status: She is alert.        Ortho Exam   Examination of the left ankle reveals the neurovascular muscular status to be intact tenderness over the lateral ankle joint fair range of motion significant significant pes planus deformity    Assessment:      Diagnosis Orders   1. Left ankle instability            Plan:   I suggested the patient that she try Lidoderm patch on the area until she gets her new AFOs on we will see her back once those are on  This patient needs corrective AFOs for both her left and right ankles due to the fact that she has severe pes planus and is having ankle pain as well as pain in her knees and has difficulty walking. Without these orthotics the next step would be corrective surgery to try to rebuild some type of an arch so that the patient can be rid of her pain in the foot and ankle. These orthotics will need to be custom made since the amount of deformity is beyond any over-the-counter bracing. These the deformities are longstanding and permanent and the braces are needed to try to correct the deformity in order to control patient's mechanics in the ankle and the foot           No orders of the defined types were placed in this encounter. No orders of the defined types were placed in this encounter. Follow up:  Return if symptoms worsen or fail to improve.     CADEN JOSEPH, DO

## 2022-11-22 ENCOUNTER — OFFICE VISIT (OUTPATIENT)
Dept: SPORTS MEDICINE | Age: 77
End: 2022-11-22
Payer: MEDICARE

## 2022-11-22 VITALS
TEMPERATURE: 96.9 F | SYSTOLIC BLOOD PRESSURE: 122 MMHG | BODY MASS INDEX: 31.08 KG/M2 | DIASTOLIC BLOOD PRESSURE: 86 MMHG | HEIGHT: 67 IN | WEIGHT: 198 LBS

## 2022-11-22 DIAGNOSIS — M25.371 INSTABILITY OF JOINTS OF BOTH ANKLES: ICD-10-CM

## 2022-11-22 DIAGNOSIS — M25.372 INSTABILITY OF JOINTS OF BOTH ANKLES: ICD-10-CM

## 2022-11-22 DIAGNOSIS — M25.372 LEFT ANKLE INSTABILITY: Primary | ICD-10-CM

## 2022-11-22 PROCEDURE — G8427 DOCREV CUR MEDS BY ELIG CLIN: HCPCS | Performed by: FAMILY MEDICINE

## 2022-11-22 PROCEDURE — 1036F TOBACCO NON-USER: CPT | Performed by: FAMILY MEDICINE

## 2022-11-22 PROCEDURE — 1123F ACP DISCUSS/DSCN MKR DOCD: CPT | Performed by: FAMILY MEDICINE

## 2022-11-22 PROCEDURE — G8400 PT W/DXA NO RESULTS DOC: HCPCS | Performed by: FAMILY MEDICINE

## 2022-11-22 PROCEDURE — 1090F PRES/ABSN URINE INCON ASSESS: CPT | Performed by: FAMILY MEDICINE

## 2022-11-22 PROCEDURE — G8484 FLU IMMUNIZE NO ADMIN: HCPCS | Performed by: FAMILY MEDICINE

## 2022-11-22 PROCEDURE — 99213 OFFICE O/P EST LOW 20 MIN: CPT | Performed by: FAMILY MEDICINE

## 2022-11-22 PROCEDURE — G8417 CALC BMI ABV UP PARAM F/U: HCPCS | Performed by: FAMILY MEDICINE

## 2022-11-22 ASSESSMENT — ENCOUNTER SYMPTOMS
CONSTIPATION: 0
NAUSEA: 0
SHORTNESS OF BREATH: 0
DIARRHEA: 0
BACK PAIN: 0

## 2022-11-22 NOTE — PROGRESS NOTES
[facility]) Physicians  Neurosurgery and Pain [facility]  [address] [facility], [address] [facility] , [address] [facility], [place] [address]: [phone]  F: [phone]      Kylee Flores  ([phone])    2022    Subjective:     Kylee Flores is 69 y.o. female who complains today of:    Chief Complaint   Patient presents with    Foot Pain    Ankle Pain     left       HPI     Patient returns stating that she is had her AFOs made and she is coming have not checked since she still gets a little bit of wobbliness on the left gets a little ache in the ankle  Allergies:  Latex, Enbucrilate, and Other    Past Medical History:   Diagnosis Date    Arthritis     Cancer ([facility] [address]     Depression     Hemorrhoids     Hypertension     Neuropathy      Past Surgical History:   Procedure Laterality Date    JOINT REPLACEMENT      KNEE SURGERY       Family History   Problem Relation Age of Onset    Arthritis Mother     Hypertension Mother     Arthritis Father     Hypertension Father      Social History     Socioeconomic History    Marital status:      Spouse name: Not on file    Number of children: Not on file    Years of education: Not on file    Highest education level: Not on file   Occupational History    Not on file   Tobacco Use    Smoking status: Former     Packs/day: 0.50     Years: 3.00     Pack years: 1.50     Types: Cigarettes     Start date: 66     Quit date:      Years since quittin.9    Smokeless tobacco: Never   Vaping Use    Vaping Use: Never used   Substance and Sexual Activity    Alcohol use:  Yes     Alcohol/week: 3.0 - 4.0 standard drinks     Types: 3 - 4 Glasses of wine per week    Drug use: Never    Sexual activity: Not on file   Other Topics Concern    Not on file   Social History Narrative    Not on file     Social Determinants of Health     Financial Resource Strain: Not on file   Food Insecurity: Not on file   Transportation Needs: Not on file   Physical Activity: Not on file Stress: Not on file   Social Connections: Not on file   Intimate Partner Violence: Not on file   Housing Stability: Not on file       Current Outpatient Medications on File Prior to Visit   Medication Sig Dispense Refill    calcium carbonate 600 MG TABS tablet Take 1 tablet by mouth 2 times daily as needed      Cholecalciferol (VITAMIN D) 10 MCG/ML LIQD       Omega-3 Fatty Acids (FISH OIL) 1000 MG CAPS       polyethylene glycol (GLYCOLAX) 17 g packet       lisinopril-hydroCHLOROthiazide (PRINZIDE;ZESTORETIC) 10-12.5 MG per tablet       hydroCHLOROthiazide (MICROZIDE) 12.5 MG capsule Take 12.5 mg by mouth daily      sertraline (ZOLOFT) 50 MG tablet       simvastatin (ZOCOR) 20 MG tablet Take 20 mg by mouth daily      Multiple Vitamins-Minerals (OCUVITE EXTRA) TABS       omeprazole (PRILOSEC) 10 MG delayed release capsule Take 20 mg by mouth daily       No current facility-administered medications on file prior to visit. Review of Systems   Constitutional:  Negative for fever. HENT:  Negative for hearing loss. Respiratory:  Negative for shortness of breath. Gastrointestinal:  Negative for constipation, diarrhea and nausea. Genitourinary:  Negative for difficulty urinating. Musculoskeletal:  Negative for back pain and neck pain. Skin:  Negative for rash. Neurological:  Negative for headaches. Hematological:  Does not bruise/bleed easily. Psychiatric/Behavioral:  Negative for sleep disturbance. Objective:     Vitals:  /86   Temp 96.9 °F (36.1 °C)   Ht 5' 7\" (1.702 m)   Wt 198 lb (89.8 kg)   BMI 31.01 kg/m² Pain Score:   0 - No pain      Physical Exam  Vitals reviewed. Constitutional:       Appearance: Normal appearance. Skin:     General: Skin is warm and dry. Neurological:      Mental Status: She is alert.        Ortho Exam   Examination of the ankles bilaterally revealed neurovascular status to be intact bilateral pes planus no palpable tenderness no edema or effusion there appeared to be a good fit on the right the left appear to fit well however there is still the tibial a little bit of increase in the supination portion    Assessment:      Diagnosis Orders   1. Left ankle instability        2. Instability of joints of both ankles            Plan:   1 have the patient return to see if she can get a little better supination on the left I told her to make sure that she is comfortable in them and she can walk in them and that should be more the determinant than making the braces look good however I think this should help her get rid of some of that ankle pain she has left she is to return as needed       No orders of the defined types were placed in this encounter. No orders of the defined types were placed in this encounter. Follow up:  Return if symptoms worsen or fail to improve.     CADEN JOSEPH, DO

## 2023-11-29 ENCOUNTER — APPOINTMENT (OUTPATIENT)
Dept: PHYSICAL THERAPY | Facility: CLINIC | Age: 78
End: 2023-11-29
Payer: MEDICARE

## 2023-12-06 ENCOUNTER — EVALUATION (OUTPATIENT)
Dept: PHYSICAL THERAPY | Facility: CLINIC | Age: 78
End: 2023-12-06
Payer: MEDICARE

## 2023-12-06 DIAGNOSIS — G83.14: ICD-10-CM

## 2023-12-06 DIAGNOSIS — R26.89 BALANCE PROBLEM: Primary | ICD-10-CM

## 2023-12-06 DIAGNOSIS — G83.10: ICD-10-CM

## 2023-12-06 DIAGNOSIS — R26.2 DIFFICULTY WALKING: ICD-10-CM

## 2023-12-06 PROCEDURE — 97163 PT EVAL HIGH COMPLEX 45 MIN: CPT | Mod: GP

## 2023-12-06 ASSESSMENT — ENCOUNTER SYMPTOMS
OCCASIONAL FEELINGS OF UNSTEADINESS: 1
DEPRESSION: 1
LOSS OF SENSATION IN FEET: 1

## 2023-12-06 NOTE — PROGRESS NOTES
Physical Therapy Evaluation:  Neuro    Patient Name:  Zena Baxter   MRN:  [id number]   Date of Evaluation:  12/06/23   Time Calculation  Start Time: 1412  Stop Time: 1457  Time Calculation (min): 45 min  Visit Number:  1 (22 of the year)  Insurance Information:  2023  90 DAY 3/6/2024 $1322.25 USED     1. Balance problem  Follow Up In Physical Therapy      2. 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 Difficulty walking  Follow Up In Physical Therapy          Assessment: Zena Baxter is a 78 year old female referred to outpatient PT for imbalance and LE weakness.  At this time, patient is at a significant risk of falls, likely influenced by profound hip abductor weakness and sensory stocking-pattern deficits in B LE.  As a result, patient would benefit from regular, intensive balance and strength training.  Patient was also encouraged to return to regular gym / independent exercise to supplement.  Based on patient's examination, concurrent co-morbidities, and presentation, patient's level of complexity is High.  As a result, recommending continued PT services to facilitate improvement in CLOF.    Problems include:  Activity limitations, Aerobic capacity / endurance, Balance, Coordination, Decreased functional level, Decreased knowledge of HEP, Fall risk, Gait / locomotion, Sensory, Strength, and Transfers    Goals:  By Discharge patient will demo:  [place] in HEP  No falls during POC  Improvement in the following outcome measures:  - 5xSTS < 15 seconds without Ues  - 6MWT 360m  - Gait Speed 1.0m/s  - Short Form Gavin 23/28  - LEFS improvement by 9pt to match MCID    Potential to achieve rehab goals is fair.    Plan:  1 times every week for a total of 10 visits.  Re-assessment after that time.    Activities that may be performed include but are not limited to:  balance, gait, transfers, modalities (as appropriate),  "e-stim (as appropriate), canalith repositioning maneuvers, therapeutic exercise, therapeutic activities.    Zena Baxter in agreement with and understanding of all discussed this date.    ----------------------------------  ----------------------------------    Subjective:    Onset Date:  2020    HPI:  Zena Baxter is a 78 year old female referred to outpatient PT for weakness and balance problems.  Patient is known to this therapist from previous POC.  Patient has not been seen since 7/19/23 d/t factors within patient's personal life.  Reports still having relatively frequent falls.      Patient Goal:  \"stop falling, get my balance bank\"     Precautions: high fall risk    [place] Fall Risk  One or more falls in the last year? Yes  How many Times? 2 or more  Was the patient injured in the fall? Yes  Has trouble stepping onto curb? Yes  Advised to use a cane or walker to get around safely? Yes  Often has to rush to toilet? No  Feels unsteady when walking? Yes  Has lost some feeling in feet? Yes  Often feels sad or depressed? Yes  Steadies self on furniture while walking at home? Yes  Takes medicine that makes them feel lightheaded or more tired than usual? No  Worried about Falling? Yes  Takes medicine to sleep or improve mood? Yes  Needs to push with hands when rising from a chair? Yes    Pain:  none    ----------------------------------  ----------------------------------    OBJECTIVE    Observation: Gait with SPC, B articulating AFOs donned    ROM: B LE ROM WFL    Strength:     LE Strength Screening:   RLE LLE   Hip Flex 4/5 4/5   Hip ABD 2/5 2/5   Hip ADD 5/5 5/5   Knee Ext 5/5 5/5   Knee Flex 5/5 5/5   Ankle DF 3+/5 3+/5   Ankle PF 4/5 4/5      Sensation:  stocking pattern loss over B LEs    Functional Mobility Assessment:  - Gait: slow pace, trendelenberg bilateral, path deviations, B toe out, decreased floor clearance, variable B foot placement at times with crossing of steps  - Transfers:  requires B UE " to rise after multiple attempts without UE, demoing multidirectional instability / imbalance upon rising, at times retropulsive  - Bed Mobility:  mildly effortful    Outcomes:  5xSTS 16 with UES  2MWT:  332ft / 101m  Gait Speed:  .84m/s  Short Form RUBIO:  15/28  LEFS:  47/80    ----------------------------------  ----------------------------------

## 2024-01-02 ENCOUNTER — TREATMENT (OUTPATIENT)
Dept: PHYSICAL THERAPY | Facility: CLINIC | Age: 79
End: 2024-01-02
Payer: MEDICARE

## 2024-01-02 DIAGNOSIS — R26.89 BALANCE PROBLEM: Primary | ICD-10-CM

## 2024-01-02 DIAGNOSIS — G83.10: ICD-10-CM

## 2024-01-02 DIAGNOSIS — R26.2 DIFFICULTY WALKING: ICD-10-CM

## 2024-01-02 PROCEDURE — 97112 NEUROMUSCULAR REEDUCATION: CPT | Mod: GP,KX

## 2024-01-02 NOTE — PROGRESS NOTES
"Physical Therapy Treatment      Patient Name: Zena Baxter  MRN: [id number]  Today's Date: 1/2/2024  Visit #: 2  Insurance:  90 DAY 3/6/2024 $1322.25 USED   Time Calculation  Start Time: 1401  Stop Time: 1444  Time Calculation (min): 43 min    1. Balance problem  Follow Up In Physical Therapy      2. 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 Difficulty walking  Follow Up In Physical Therapy          ASSESSMENT:  Good participation throughout.  Highly challenged by activities and fatigue.  No adverse events.    GOALS:  [place] in HEP  No falls during POC  Improvement in the following outcome measures:  - 5xSTS < 15 seconds without Ues  - 6MWT 360m  - Gait Speed 1.0m/s  - Short Form Gavin 23/28  - LEFS improvement by 9pt to match MCID    PLAN:  BFR, dynamic balance    SUBJECTIVE:  \"Not worth two hoots and a holler.\"  Really enjoyed having granddaughter around.    OBJECTIVE:    TREATMENT:  Neuromuscular Re-education (00356): 44 minutes    R LE BFR:  - LOP 212mmHg  - PTP 170mmHg    Straight Leg Raise:  30 reps, 2 sets x 10 reps, then x 10 from hooklying position  Clams:  30 reps, 3 sets x 10 reps  B-Stance Bridge:  30 reps, 3 sets x 10 reps  STS:  3 sets x 8  "

## 2024-01-08 ENCOUNTER — TREATMENT (OUTPATIENT)
Dept: PHYSICAL THERAPY | Facility: CLINIC | Age: 79
End: 2024-01-08
Payer: MEDICARE

## 2024-01-08 DIAGNOSIS — G83.10: ICD-10-CM

## 2024-01-08 DIAGNOSIS — G83.10 MONOPLEGIA OF LOWER EXTREMITY, UNSPECIFIED ETIOLOGY, UNSPECIFIED LATERALITY (MULTI): ICD-10-CM

## 2024-01-08 DIAGNOSIS — R26.89 BALANCE PROBLEM: Primary | ICD-10-CM

## 2024-01-08 DIAGNOSIS — R26.2 DIFFICULTY WALKING: ICD-10-CM

## 2024-01-08 PROCEDURE — 97112 NEUROMUSCULAR REEDUCATION: CPT | Mod: GP

## 2024-01-08 NOTE — PROGRESS NOTES
Physical Therapy Treatment      Patient Name: Zena Baxter  MRN: [id number]  Today's Date: 1/8/2024  Visit #: 3  Insurance:  90 DAY 3/6/2024 $1322.25 USED   Time Calculation  Start Time: 1139  Stop Time: 1223  Time Calculation (min): 44 min    1. Balance problem  Follow Up In Physical Therapy      2. 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 Difficulty walking  Follow Up In Physical Therapy      4. Monoplegia of lower extremity, unspecified etiology, unspecified laterality (CMS/HCC)            ASSESSMENT:  Good participation throughout.  Highly challenged by activities and fatigue.  No adverse events.    GOALS:  [place] in HEP  No falls during POC  Improvement in the following outcome measures:  - 5xSTS < 15 seconds without Ues  - 6MWT 360m  - Gait Speed 1.0m/s  - Short Form Gavin 23/28  - LEFS improvement by 9pt to match MCID    PLAN:  BFR, dynamic balance    SUBJECTIVE:  Was sore after last time for a couple days.    OBJECTIVE:    TREATMENT:  Neuromuscular Re-education (51890): 44 minutes    R LE BFR:  - LOP 153mmHg  - PTP 122mmHg    Straight Leg Raise:  30 reps, 3 sets x 15 (from hooklying position)  Clams:  30 reps, 3 sets x 10 reps  B-Stance Bridge:  30 reps, 3 sets x 10 reps  STS:  4 sets x 10 (increased)

## 2024-01-10 ENCOUNTER — APPOINTMENT (OUTPATIENT)
Dept: PHYSICAL THERAPY | Facility: CLINIC | Age: 79
End: 2024-01-10
Payer: MEDICARE

## 2024-01-16 ENCOUNTER — TREATMENT (OUTPATIENT)
Dept: PHYSICAL THERAPY | Facility: CLINIC | Age: 79
End: 2024-01-16
Payer: MEDICARE

## 2024-01-16 DIAGNOSIS — R26.2 DIFFICULTY WALKING: ICD-10-CM

## 2024-01-16 DIAGNOSIS — G83.10: ICD-10-CM

## 2024-01-16 DIAGNOSIS — R26.89 BALANCE PROBLEM: Primary | ICD-10-CM

## 2024-01-16 PROCEDURE — 97112 NEUROMUSCULAR REEDUCATION: CPT | Mod: GP

## 2024-01-16 NOTE — PROGRESS NOTES
Physical Therapy Treatment      Patient Name: Zena Baxter  MRN: [id number]  Today's Date: 1/16/2024  Visit #: 4  Insurance:  90 DAY 3/6/2024 $1322.25 USED   Time Calculation  Start Time: 1120  Stop Time: 1200  Time Calculation (min): 40 min    1. Balance problem  Follow Up In Physical Therapy      2. 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 Difficulty walking  Follow Up In Physical Therapy            ASSESSMENT:  Good participation throughout.  Highly challenged by activities and fatigue.  No adverse events.    GOALS:  [place] in HEP  No falls during POC  Improvement in the following outcome measures:  - 5xSTS < 15 seconds without Ues  - 6MWT 360m  - Gait Speed 1.0m/s  - Short Form Gavin 23/28  - LEFS improvement by 9pt to match MCID    PLAN:  BFR, dynamic balance    SUBJECTIVE:  Not as sore after this last time.    OBJECTIVE:    TREATMENT:  Neuromuscular Re-education (14134): 44 minutes    R LE BFR:  - LOP 219mmHg  - PTP 175mmHg    Clams:  30 reps, 3 sets x 10 reps  B-Stance Bridge:  30 reps, 3 sets x 10 reps  STS:  4 sets x 10 (maintained)    BlazePods:  - Foot Taps -- 25 targets -- use of SPC for steadying

## 2024-01-23 ENCOUNTER — TREATMENT (OUTPATIENT)
Dept: PHYSICAL THERAPY | Facility: CLINIC | Age: 79
End: 2024-01-23
Payer: MEDICARE

## 2024-01-23 DIAGNOSIS — R26.89 BALANCE PROBLEM: ICD-10-CM

## 2024-01-23 DIAGNOSIS — G83.10 MONOPLEGIA OF LOWER EXTREMITY, UNSPECIFIED ETIOLOGY, UNSPECIFIED LATERALITY (MULTI): Primary | ICD-10-CM

## 2024-01-23 DIAGNOSIS — R26.2 DIFFICULTY WALKING: ICD-10-CM

## 2024-01-23 DIAGNOSIS — G83.10: ICD-10-CM

## 2024-01-23 PROCEDURE — 97112 NEUROMUSCULAR REEDUCATION: CPT | Mod: GP

## 2024-01-23 NOTE — PROGRESS NOTES
Physical Therapy Treatment      Patient Name: Zena Baxter  MRN: [id number]  Today's Date: 2024  Visit #: 6  Insurance:  90 DAY 3/6/2024 $1322.25 USED   Time Calculation  Start Time: 1135  Stop Time: 1217  Time Calculation (min): 42 min    1. Monoplegia of lower limb affecting unspecified side (CMS/HCC)  Follow Up In Physical Therapy      2. Balance problem  Follow Up In Physical Therapy      3. Difficulty walking  Follow Up In Physical Therapy              ASSESSMENT:  Good participation throughout.  Highly challenged by activities and fatigue.  No adverse events.    GOALS:  [place] in HEP  No falls during POC  Improvement in the following outcome measures:  - 5xSTS < 15 seconds without Ues  - 6MWT 360m  - Gait Speed 1.0m/s  - Short Form Gavin 23/28  - LEFS improvement by 9pt to match MCID    PLAN:  BFR, dynamic balance    SUBJECTIVE:  Not as sore after this last time.    OBJECTIVE:    TREATMENT:  Neuromuscular Re-education (23074): 42 minutes    R LE BFR:  - LOP 178mmHg  - PTP 142mmHg    Clams:  30 reps, 3 sets x 15 reps  B-Stance Bridge:  30 reps, 3 sets x 15 reps  Standing Sidesteppin reps, 3 sets x 15 reps    ^^ challenged by all of the above, fatiguing

## 2024-01-30 ENCOUNTER — TREATMENT (OUTPATIENT)
Dept: PHYSICAL THERAPY | Facility: CLINIC | Age: 79
End: 2024-01-30
Payer: MEDICARE

## 2024-01-30 DIAGNOSIS — G83.10 MONOPLEGIA OF LOWER EXTREMITY, UNSPECIFIED ETIOLOGY, UNSPECIFIED LATERALITY (MULTI): ICD-10-CM

## 2024-01-30 DIAGNOSIS — G83.10: ICD-10-CM

## 2024-01-30 DIAGNOSIS — R26.89 BALANCE PROBLEM: Primary | ICD-10-CM

## 2024-01-30 DIAGNOSIS — R26.2 DIFFICULTY WALKING: ICD-10-CM

## 2024-01-30 PROCEDURE — 97112 NEUROMUSCULAR REEDUCATION: CPT | Mod: GP

## 2024-01-30 NOTE — PROGRESS NOTES
Physical Therapy Treatment      Patient Name: Zena Baxter  MRN: [id number]  Today's Date: 1/30/2024  Visit #: 7  Insurance:  90 DAY 3/6/2024 $1322.25 USED   Time Calculation  Start Time: 1145  Stop Time: 1212  Time Calculation (min): 27 min    1. Balance problem  Follow Up In Physical Therapy      2. Difficulty walking  Follow Up In Physical Therapy      3. Monoplegia of lower limb affecting unspecified side (CMS/HCC)  Follow Up In Physical Therapy      4. Monoplegia of lower extremity, unspecified etiology, unspecified laterality (CMS/HCC) [G83.10]                  ASSESSMENT:  Good participation throughout.  Highly challenged by activities and fatigue.  No adverse events.    GOALS:  [place] in HEP  No falls during POC  Improvement in the following outcome measures:  - 5xSTS < 15 seconds without Ues  - 6MWT 360m  - Gait Speed 1.0m/s  - Short Form Gavin 23/28  - LEFS improvement by 9pt to match MCID    PLAN:  BFR, dynamic balance    SUBJECTIVE:  Apologetic for running late.  Had difficulties getting out the house.    OBJECTIVE:    TREATMENT:  Neuromuscular Re-education (85133): 42 minutes    R LE BFR:  - LOP 194mmHg  - PTP 155mmHg    Clams:  30 reps, 3 sets x 15 reps  B-Stance Bridge:  30 reps, 3 sets x 15 reps  Sit to Stands:  3 sets x 10  ^^ challenged by all of the above, fatiguing

## 2024-02-06 ENCOUNTER — TREATMENT (OUTPATIENT)
Dept: PHYSICAL THERAPY | Facility: CLINIC | Age: 79
End: 2024-02-06
Payer: MEDICARE

## 2024-02-06 DIAGNOSIS — G83.10: ICD-10-CM

## 2024-02-06 DIAGNOSIS — R26.2 DIFFICULTY WALKING: ICD-10-CM

## 2024-02-06 DIAGNOSIS — G83.11: ICD-10-CM

## 2024-02-06 DIAGNOSIS — R26.89 BALANCE PROBLEM: Primary | ICD-10-CM

## 2024-02-06 PROCEDURE — 97112 NEUROMUSCULAR REEDUCATION: CPT | Mod: GP

## 2024-02-06 NOTE — PROGRESS NOTES
Physical Therapy Treatment      Patient Name: Zena Baxter  MRN: [id number]  Today's Date: 2024  Visit #: 8  Insurance:  90 DAY 3/6/2024 $1322.25 USED   Time Calculation  Start Time: 1139  Stop Time: 1210  Time Calculation (min): 31 min    1. Balance problem  Follow Up In Physical Therapy      2. Difficulty walking  Follow Up In Physical Therapy      3. Monoplegia of lower limb affecting unspecified side (CMS/HCC)  Follow Up In Physical Therapy      4. 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  Highly challenged by activities and fatigue.  No adverse events.  Will be ready to progress along activities.    GOALS:  [place] in HEP  No falls during POC  Improvement in the following outcome measures:  - 5xSTS < 15 seconds without Ues  - 6MWT 360m  - Gait Speed 1.0m/s  - Short Form Gavin 23/28  - LEFS improvement by 9pt to match MCID    PLAN:  BFR, dynamic balance    SUBJECTIVE:  Apologetic for running late again.      OBJECTIVE:    TREATMENT:  Neuromuscular Re-education (76685): 31 minutes    R LE BFR:  - LOP 180mmHg  - PTP 144mmHg    Clams:  30 reps, 3 sets x 15 reps  B-Stance Bridge:  30 reps, 3 sets x 15 reps  Sidesteppin reps, 3 sets x 15 reps  ^^ challenged by all of the above, fatiguing

## 2024-02-13 ENCOUNTER — TREATMENT (OUTPATIENT)
Dept: PHYSICAL THERAPY | Facility: CLINIC | Age: 79
End: 2024-02-13
Payer: MEDICARE

## 2024-02-13 DIAGNOSIS — R26.89 BALANCE PROBLEM: ICD-10-CM

## 2024-02-13 DIAGNOSIS — R26.2 DIFFICULTY WALKING: ICD-10-CM

## 2024-02-13 DIAGNOSIS — G83.10 MONOPLEGIA OF LOWER EXTREMITY, UNSPECIFIED ETIOLOGY, UNSPECIFIED LATERALITY (MULTI): Primary | ICD-10-CM

## 2024-02-13 DIAGNOSIS — G83.10: ICD-10-CM

## 2024-02-13 PROCEDURE — 97112 NEUROMUSCULAR REEDUCATION: CPT | Mod: GP

## 2024-02-13 NOTE — PROGRESS NOTES
Physical Therapy Treatment      Patient Name: Zena Baxter  MRN: [id number]  Today's Date: 2024  Visit #: 9  Insurance:  90 DAY 3/6/2024 $1322.25 USED   Time Calculation  Start Time: 1130  Stop Time: 1210  Time Calculation (min): 40 min    1. Monoplegia of lower extremity, unspecified etiology, unspecified laterality (CMS/HCC)        2. 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 Balance problem  Follow Up In Physical Therapy      4. Difficulty walking  Follow Up In Physical Therapy                  ASSESSMENT:  Good participation throughout.  Highly challenged by activities and fatigue.  No adverse events.  Able to progress activities slightly this date.    GOALS:  [place] in HEP  No falls during POC  Improvement in the following outcome measures:  - 5xSTS < 15 seconds without Ues  - 6MWT 360m  - Gait Speed 1.0m/s  - Short Form Gavin 23/28  - LEFS improvement by 9pt to match MCID    PLAN:  Re-assessment    SUBJECTIVE:  Had a LOB with near fall but sat in large flower pot.   No injury.    OBJECTIVE:    TREATMENT:  Neuromuscular Re-education (72155): 40 minutes    R LE BFR:  - LOP 182mmHg  - PTP 146mmHg    Clams:  30 reps, 3 sets x 15 reps with 10lbs  B-Stance Bridge:  30 reps, 3 sets x 15 reps  Sidesteppin reps, 3 sets x 15 reps  ^^ challenged by all of the above, fatiguing

## 2024-02-20 ENCOUNTER — TREATMENT (OUTPATIENT)
Dept: PHYSICAL THERAPY | Facility: CLINIC | Age: 79
End: 2024-02-20
Payer: MEDICARE

## 2024-02-20 DIAGNOSIS — G83.10: ICD-10-CM

## 2024-02-20 DIAGNOSIS — R26.2 DIFFICULTY WALKING: ICD-10-CM

## 2024-02-20 DIAGNOSIS — R26.89 BALANCE PROBLEM: ICD-10-CM

## 2024-02-20 DIAGNOSIS — G83.11: Primary | ICD-10-CM

## 2024-02-20 PROCEDURE — 97112 NEUROMUSCULAR REEDUCATION: CPT | Mod: GP

## 2024-02-20 NOTE — PROGRESS NOTES
Physical Therapy Treatment      Patient Name: Zena Baxter  MRN: [id number]  Today's Date: 2024  Visit #: 10  Insurance:  90 DAY 3/6/2024 $1322.25 USED   Time Calculation  Start Time: 1120  Stop Time: 1200  Time Calculation (min): 40 min    1. 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 Balance problem  Follow Up In Physical Therapy      4. Difficulty walking  Follow Up In Physical Therapy                  ASSESSMENT:  Good participation throughout.  Highly challenged by activities and fatigue.  Still demos significant imbalance in between activities, necessitating continued PT services.  Will be re-testing next session.    GOALS:  [place] in HEP  No falls during POC  Improvement in the following outcome measures:  - 5xSTS < 15 seconds without Ues  - 6MWT 360m  - Gait Speed 1.0m/s  - Short Form Gavin 23/28  - LEFS improvement by 9pt to match MCID    PLAN:  Re-assessment    SUBJECTIVE:  No new falls.  Tired today    OBJECTIVE:    TREATMENT:  Neuromuscular Re-education (51258): 40 minutes    R LE BFR:  - LOP 194mmHg  - PTP 155mmHg    Clams:  30 reps, 3 sets x 15 reps with 10lbs  B-Stance Bridge:  30 reps, 3 sets x 15 reps  Sidesteppin reps, 3 sets x 15 reps  ^^ challenged by all of the above, fatiguing

## 2024-02-27 ENCOUNTER — TREATMENT (OUTPATIENT)
Dept: PHYSICAL THERAPY | Facility: CLINIC | Age: 79
End: 2024-02-27
Payer: MEDICARE

## 2024-02-27 DIAGNOSIS — G83.11: ICD-10-CM

## 2024-02-27 DIAGNOSIS — R26.89 BALANCE PROBLEM: ICD-10-CM

## 2024-02-27 DIAGNOSIS — R26.2 DIFFICULTY WALKING: ICD-10-CM

## 2024-02-27 DIAGNOSIS — G83.10: Primary | ICD-10-CM

## 2024-02-27 PROCEDURE — 97530 THERAPEUTIC ACTIVITIES: CPT | Mod: GP

## 2024-02-27 NOTE — PROGRESS NOTES
Physical Therapy Treatment and Reassessment     Patient Name: Zena Baxter  MRN: [id number]  Today's Date: 2024  Visit #: 11  Insurance:  90 DAY 3/6/2024 $1322.25 USED   Time Calculation  Start Time: 1134  Stop Time: 1213  Time Calculation (min): 39 min    1. Monoplegia of lower limb affecting unspecified side (CMS/HCC)  Follow Up In Physical Therapy      2. Balance problem  Follow Up In Physical Therapy      3. Difficulty walking  Follow Up In Physical Therapy      4. 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  She has shown improvements, however, since initial evaluation which is promising.  Given HEP this date.    GOALS:  [place] in HEP  No falls during POC  Improvement in the following outcome measures:  - 5xSTS < 15 seconds without Ues  - 6MWT 360m  - Gait Speed 1.0m/s  - Short Form Gavin   - LEFS improvement by 9pt to match MCID  ^^ unmet but improving    PLAN:  Continue with PT POC.    SUBJECTIVE:  Fatigued overall from moving furniture    OBJECTIVE:    TREATMENT:  Therapeutic Activity (76380):  39 minutes  5xSTS:  15 seconds with UE; able to rise 2x without UE  6MWT:  4:45, 214m  Gait Speed:  .75m/s  SF Gavin/28  LEFS:  52/80    Given the following for HEP:  STS -- 3 sets x 6  Walking 4 minutes daily  [place] 5 sets x 30 seconds

## 2024-03-05 ENCOUNTER — TREATMENT (OUTPATIENT)
Dept: PHYSICAL THERAPY | Facility: CLINIC | Age: 79
End: 2024-03-05
Payer: MEDICARE

## 2024-03-05 DIAGNOSIS — R26.89 BALANCE PROBLEM: ICD-10-CM

## 2024-03-05 DIAGNOSIS — G83.10: ICD-10-CM

## 2024-03-05 DIAGNOSIS — G83.11: Primary | ICD-10-CM

## 2024-03-05 DIAGNOSIS — R26.2 DIFFICULTY WALKING: ICD-10-CM

## 2024-03-05 PROCEDURE — 97530 THERAPEUTIC ACTIVITIES: CPT | Mod: GP

## 2024-03-05 PROCEDURE — 97112 NEUROMUSCULAR REEDUCATION: CPT | Mod: GP

## 2024-03-05 NOTE — PROGRESS NOTES
Physical Therapy Treatment and Reassessment     Patient Name: Zena Baxter  MRN: [id number]  Today's Date: 3/5/2024  Visit #: 12  Insurance:  90 DAY 3/6/2024 $1322.25 USED   Time Calculation  Start Time: 1137  Stop Time: 1216  Time Calculation (min): 39 min    1. 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 Balance problem  Follow Up In Physical Therapy      4. Difficulty walking  Follow Up In Physical Therapy                      ASSESSMENT:  Challenged by all activities.  Receptive to discussion.  No adverse events.    GOALS:  [place] in HEP  No falls during POC  Improvement in the following outcome measures:  - 5xSTS < 15 seconds without Ues  - 6MWT 360m  - Gait Speed 1.0m/s  - Short Form Gavin 23/28  - LEFS improvement by 9pt to match MCID  ^^ unmet but improving    PLAN:  Continue with PT POC.    SUBJECTIVE:  Saw an orthopedist for L ankle.  Told that d/t the deformity, will need to get a CT done.    OBJECTIVE:    TREATMENT:    Theract 58168:  9 minutes    Discussed at length the potential risks and benefits and concerns surrounding surgical procedure for ankle from a functional perspective.      Neuromuscular Re-education (18540): 30 minutes     R LE BFR:  - LOP 194mmHg  - PTP 155mmHg     Clams:  30 reps, 3 sets x 15 reps with 10lbs  B-Stance Bridge:  30 reps, 3 sets x 15 reps  ^^ challenged by all of the above, fatiguing

## 2024-03-12 ENCOUNTER — TREATMENT (OUTPATIENT)
Dept: PHYSICAL THERAPY | Facility: CLINIC | Age: 79
End: 2024-03-12
Payer: MEDICARE

## 2024-03-12 DIAGNOSIS — R26.2 DIFFICULTY WALKING: ICD-10-CM

## 2024-03-12 DIAGNOSIS — R26.89 BALANCE PROBLEM: Primary | ICD-10-CM

## 2024-03-12 PROCEDURE — 97112 NEUROMUSCULAR REEDUCATION: CPT | Mod: GP,KX

## 2024-03-12 NOTE — PROGRESS NOTES
Physical Therapy Treatment and Reassessment     Patient Name: Zena Baxter  MRN: [id number]  Today's Date: 3/12/2024  Visit #: 13  Insurance:  90 DAY 5/27/24   Time Calculation  Start Time: 1155  Stop Time: 1225  Time Calculation (min): 30 min    1. Balance problem        2. Difficulty walking              ASSESSMENT:  Challenged by all activities.  No adverse events.  Continues to be limited by LE weakness and neuropathies and L ankle discomfort.    GOALS:  [place] in HEP  No falls during POC  Improvement in the following outcome measures:  - 5xSTS < 15 seconds without Ues  - 6MWT 360m  - Gait Speed 1.0m/s  - Short Form Gavin 23/28  - LEFS improvement by 9pt to match MCID  ^^ unmet but improving    PLAN:  Continue with PT POC.    SUBJECTIVE:  Has a CT for L ankle today.    OBJECTIVE:    TREATMENT:    Neuromuscular Re-education (35609): 30 minutes     R LE BFR:  - LOP 199mmHg  - PTP 159mmHg     Clams:  30 reps, 3 sets x 15 reps with 10lbs  B-Stance Bridge:  30 reps, 3 sets x 15 reps  STS 4 sets x 10  ^^ challenged by all of the above, fatiguing

## 2024-03-19 ENCOUNTER — TREATMENT (OUTPATIENT)
Dept: PHYSICAL THERAPY | Facility: CLINIC | Age: 79
End: 2024-03-19
Payer: MEDICARE

## 2024-03-19 DIAGNOSIS — R26.89 BALANCE PROBLEM: Primary | ICD-10-CM

## 2024-03-19 DIAGNOSIS — R26.2 DIFFICULTY WALKING: ICD-10-CM

## 2024-03-19 PROCEDURE — 97112 NEUROMUSCULAR REEDUCATION: CPT | Mod: GP,KX

## 2024-03-19 NOTE — PROGRESS NOTES
Physical Therapy Treatment and Reassessment     Patient Name: Zena Baxter  MRN: [id number]  Today's Date: 3/19/2024  Visit #: 14  Insurance:  90 DAY 5/27/24   Time Calculation  Start Time: 1117  Stop Time: 1157  Time Calculation (min): 40 min    1. Balance problem        2. Difficulty walking              ASSESSMENT:  Challenged by all activities.  No adverse events.  Continues to be limited by LE weakness and neuropathies and L ankle discomfort.    GOALS:  [place] in HEP  No falls during POC  Improvement in the following outcome measures:  - 5xSTS < 15 seconds without Ues  - 6MWT 360m  - Gait Speed 1.0m/s  - Short Form Gavin 23/28  - LEFS improvement by 9pt to match MCID  ^^ unmet but improving    PLAN:  Continue with PT POC.    SUBJECTIVE:  Has an appointment for L ankle next Monday, 3/25, after CT that was done last week.    OBJECTIVE:    TREATMENT:    Neuromuscular Re-education (30713): 40 minutes     R LE BFR:  - LOP 179mmHg  - PTP 143mmHg     Clams:  30 reps, 3 sets x 15 reps with 10lbs  B-Stance Bridge:  30 reps, 3 sets x 15 reps  STS 4 sets x 12  ^^ challenged by all of the above, fatiguing, needing cues for proper performance for each

## 2024-03-26 ENCOUNTER — APPOINTMENT (OUTPATIENT)
Dept: PHYSICAL THERAPY | Facility: CLINIC | Age: 79
End: 2024-03-26
Payer: MEDICARE

## 2024-04-03 ENCOUNTER — TREATMENT (OUTPATIENT)
Dept: PHYSICAL THERAPY | Facility: CLINIC | Age: 79
End: 2024-04-03
Payer: MEDICARE

## 2024-04-03 DIAGNOSIS — R26.2 DIFFICULTY WALKING: ICD-10-CM

## 2024-04-03 DIAGNOSIS — R26.89 BALANCE PROBLEM: Primary | ICD-10-CM

## 2024-04-03 PROCEDURE — 97112 NEUROMUSCULAR REEDUCATION: CPT | Mod: GP,KX

## 2024-04-03 NOTE — PROGRESS NOTES
Physical Therapy Treatment and Reassessment     Patient Name: Zena Baxter  MRN: [id number]  Today's Date: 4/3/2024  Visit #: 15  Insurance:  90 DAY 5/27/24   Time Calculation  Start Time: 1102  Stop Time: 1129  Time Calculation (min): 27 min    1. Balance problem        2. Difficulty walking                ASSESSMENT:  Challenged by all activities.  No adverse events.  Continues to be limited by LE weakness and neuropathies and L ankle discomfort.    GOALS:  [place] in HEP  No falls during POC  Improvement in the following outcome measures:  - 5xSTS < 15 seconds without Ues  - 6MWT 360m  - Gait Speed 1.0m/s  - Short Form Gavin 23/28  - LEFS improvement by 9pt to match MCID  ^^ unmet but improving    PLAN:  Continue with PT POC.    SUBJECTIVE:  Going to get the surgery on L ankle, but isnt sure when.    OBJECTIVE:    TREATMENT:    Neuromuscular Re-education (55137): 27 minutes     R LE BFR:  - LOP 190mmHg  - PTP 152mmHg     Clams:  30 reps, 3 sets x 15 reps with 10lbs  B-Stance Bridge:  30 reps, 3 sets x 15 reps  Sidestepping:  x 3 minutes  ^^ challenged by all of the above, fatiguing, needing cues for proper performance for each

## 2024-04-11 ENCOUNTER — APPOINTMENT (OUTPATIENT)
Dept: PHYSICAL THERAPY | Facility: CLINIC | Age: 79
End: 2024-04-11
Payer: MEDICARE

## 2024-04-15 ENCOUNTER — TREATMENT (OUTPATIENT)
Dept: PHYSICAL THERAPY | Facility: CLINIC | Age: 79
End: 2024-04-15
Payer: MEDICARE

## 2024-04-15 DIAGNOSIS — R26.2 DIFFICULTY WALKING: ICD-10-CM

## 2024-04-15 DIAGNOSIS — R26.89 BALANCE PROBLEM: Primary | ICD-10-CM

## 2024-04-15 PROCEDURE — 97112 NEUROMUSCULAR REEDUCATION: CPT | Mod: GP,KX

## 2024-04-15 NOTE — PROGRESS NOTES
Physical Therapy Treatment     Patient Name: Zena Baxter  MRN: [id number]  Today's Date: 4/15/2024  Visit #: 16  Insurance:  90 DAY 5/27/24   Time Calculation  Start Time: 1535  Stop Time: 1614  Time Calculation (min): 39 min    1. Balance problem        2. Difficulty walking                  ASSESSMENT:  Challenged by all activities.  Significantly fatigued by end of session.  No adverse events.  Continues to be limited by LE weakness and neuropathies and L ankle discomfort.    GOALS:  [place] in HEP  No falls during POC  Improvement in the following outcome measures:  - 5xSTS < 15 seconds without Ues  - 6MWT 360m  - Gait Speed 1.0m/s  - Short Form Gavin 23/28  - LEFS improvement by 9pt to match MCID  ^^ unmet but improving    PLAN:  Continue with PT POC.    SUBJECTIVE:  Fell yesterday when tripped over foot and couldn't recover.    OBJECTIVE:    TREATMENT:    Neuromuscular Re-education (71405): 39 minutes     R LE BFR:  - LOP 187mmHg  - PTP 150mmHg     Clams:  30 reps, 3 sets x 15 reps with 12.5lbs  B-Stance Bridge:  30 reps, 3 sets x 15 reps  STS:  3 sets x 15 reps  Supine March:  30 reps, 3 sets x 15  ^^ challenged by all of the above, fatiguing, needing cues for proper performance for each

## 2024-04-24 ENCOUNTER — TREATMENT (OUTPATIENT)
Dept: PHYSICAL THERAPY | Facility: CLINIC | Age: 79
End: 2024-04-24
Payer: MEDICARE

## 2024-04-24 DIAGNOSIS — R26.2 DIFFICULTY WALKING: ICD-10-CM

## 2024-04-24 DIAGNOSIS — R26.89 BALANCE PROBLEM: Primary | ICD-10-CM

## 2024-04-24 PROCEDURE — 97112 NEUROMUSCULAR REEDUCATION: CPT | Mod: GP,KX

## 2024-04-24 NOTE — PROGRESS NOTES
Physical Therapy Treatment     Patient Name: Zena Baxter  MRN: [id number]  Today's Date: 2024  Visit #: 16  Insurance:  90 DAY 5/27/24   Time Calculation  Start Time: 1700  Stop Time: 1738  Time Calculation (min): 38 min    1. Balance problem        2. Difficulty walking                    ASSESSMENT:  Challenged by all activities.  Significantly fatigued by end of session.  No adverse events.  Continues to be limited by LE weakness and neuropathies and L ankle discomfort.    GOALS:  [place] in HEP  No falls during POC  Improvement in the following outcome measures:  - 5xSTS < 15 seconds without Ues  - 6MWT 360m  - Gait Speed 1.0m/s  - Short Form Gavin   - LEFS improvement by 9pt to match MCID  ^^ unmet but improving    PLAN:  Continue with PT POC.    SUBJECTIVE:  Fell one of the last couple days when turned too quickly, started to lose balance, and couldn't correct self.  No injury.    OBJECTIVE:    TREATMENT:    Neuromuscular Re-education (92949): 38 minutes     R LE BFR:  - LOP 197mmHg  - PTP 158mmHg     Clams:  30 reps, 3 sets x 15 reps with 12.5lbs  B-Stance Bridge:  30 reps, 3 sets x 15 reps  Sidesteppin reps, 3 sets x 15 reps  STS:  3 sets x 15 reps  Supine March:  30 reps, 3 sets x 15  ^^ challenged by all of the above, fatiguing, needing cues for proper performance for each

## 2024-04-25 ENCOUNTER — APPOINTMENT (OUTPATIENT)
Dept: PHYSICAL THERAPY | Facility: CLINIC | Age: 79
End: 2024-04-25
Payer: MEDICARE

## 2024-05-01 ENCOUNTER — APPOINTMENT (OUTPATIENT)
Dept: PHYSICAL THERAPY | Facility: CLINIC | Age: 79
End: 2024-05-01
Payer: MEDICARE

## 2024-05-01 NOTE — PROGRESS NOTES
Physical Therapy Treatment     Patient Name: Zena Baxter  MRN: [id number]  Today's Date: 2024  Visit #: 17  Insurance:  90 DAY 5/27/24        No diagnosis found.              ASSESSMENT:  Challenged by all activities.  Significantly fatigued by end of session.  No adverse events.  Continues to be limited by LE weakness and neuropathies and L ankle discomfort.    GOALS:  [place] in HEP  No falls during POC  Improvement in the following outcome measures:  - 5xSTS < 15 seconds without Ues  - 6MWT 360m  - Gait Speed 1.0m/s  - Short Form Gavin   - LEFS improvement by 9pt to match MCID  ^^ unmet but improving    PLAN:  Continue with PT POC.    SUBJECTIVE:  Fell one of the last couple days when turned too quickly, started to lose balance, and couldn't correct self.  No injury.    OBJECTIVE:    TREATMENT:    Neuromuscular Re-education (00553): 38 minutes     R LE BFR:  - LOP 197mmHg  - PTP 158mmHg     Clams:  30 reps, 3 sets x 15 reps with 12.5lbs  B-Stance Bridge:  30 reps, 3 sets x 15 reps  Sidesteppin reps, 3 sets x 15 reps  STS:  3 sets x 15 reps  Supine March:  30 reps, 3 sets x 15  ^^ challenged by all of the above, fatiguing, needing cues for proper performance for each

## 2024-05-02 ENCOUNTER — APPOINTMENT (OUTPATIENT)
Dept: PHYSICAL THERAPY | Facility: CLINIC | Age: 79
End: 2024-05-02
Payer: MEDICARE

## 2024-05-28 ENCOUNTER — PRE-ADMISSION TESTING (OUTPATIENT)
Dept: PREADMISSION TESTING | Facility: HOSPITAL | Age: 79
End: 2024-05-28
Payer: MEDICARE

## 2024-05-28 VITALS
TEMPERATURE: 95.9 F | RESPIRATION RATE: 14 BRPM | WEIGHT: 197.97 LBS | OXYGEN SATURATION: 94 % | BODY MASS INDEX: 31.07 KG/M2 | HEART RATE: 91 BPM | SYSTOLIC BLOOD PRESSURE: 127 MMHG | HEIGHT: 67 IN | DIASTOLIC BLOOD PRESSURE: 65 MMHG

## 2024-05-28 DIAGNOSIS — M79.672 LEFT FOOT PAIN: ICD-10-CM

## 2024-05-28 DIAGNOSIS — Z01.818 PREOP TESTING: Primary | ICD-10-CM

## 2024-05-28 PROCEDURE — 93010 ELECTROCARDIOGRAM REPORT: CPT | Performed by: INTERNAL MEDICINE

## 2024-05-28 PROCEDURE — 93005 ELECTROCARDIOGRAM TRACING: CPT

## 2024-05-28 RX ORDER — CHOLECALCIFEROL (VITAMIN D3) 50 MCG
50 TABLET ORAL DAILY
Status: ON HOLD | COMMUNITY

## 2024-05-28 RX ORDER — SIMVASTATIN 20 MG/1
20 TABLET, FILM COATED ORAL NIGHTLY
Status: ON HOLD | COMMUNITY
Start: 2024-03-20

## 2024-05-28 RX ORDER — HYDROCHLOROTHIAZIDE 12.5 MG/1
12.5 CAPSULE ORAL EVERY MORNING
Status: ON HOLD | COMMUNITY
Start: 2023-07-06

## 2024-05-28 RX ORDER — POLYETHYLENE GLYCOL 3350 17 G/17G
17 POWDER, FOR SOLUTION ORAL DAILY PRN
Status: ON HOLD | COMMUNITY

## 2024-05-28 RX ORDER — OMEPRAZOLE 20 MG/1
20 CAPSULE, DELAYED RELEASE ORAL DAILY
Status: ON HOLD | COMMUNITY
Start: 2024-02-13 | End: 2025-05-15

## 2024-05-28 RX ORDER — LISINOPRIL AND HYDROCHLOROTHIAZIDE 10; 12.5 MG/1; MG/1
1 TABLET ORAL DAILY
Status: ON HOLD | COMMUNITY
Start: 2024-03-05

## 2024-05-28 RX ORDER — FLUTICASONE PROPIONATE 50 MCG
2 SPRAY, SUSPENSION (ML) NASAL DAILY
Status: ON HOLD | COMMUNITY

## 2024-05-28 RX ORDER — SERTRALINE HYDROCHLORIDE 50 MG/1
100 TABLET, FILM COATED ORAL DAILY
Status: ON HOLD | COMMUNITY
Start: 2024-04-18

## 2024-05-28 ASSESSMENT — DUKE ACTIVITY SCORE INDEX (DASI)
CAN YOU WALK A BLOCK OR TWO ON LEVEL GROUND: YES
CAN YOU PARTICIPATE IN STRENOUS SPORTS LIKE SWIMMING, SINGLES TENNIS, FOOTBALL, BASKETBALL, OR SKIING: NO
CAN YOU DO YARD WORK LIKE RAKING LEAVES, WEEDING OR PUSHING A MOWER: NO
CAN YOU RUN A SHORT DISTANCE: NO
CAN YOU DO HEAVY WORK AROUND THE HOUSE LIKE SCRUBBING FLOORS OR LIFTING AND MOVING HEAVY FURNITURE: YES
CAN YOU DO LIGHT WORK AROUND THE HOUSE LIKE DUSTING OR WASHING DISHES: YES
DASI METS SCORE: 6.8
CAN YOU CLIMB A FLIGHT OF STAIRS OR WALK UP A HILL: YES
TOTAL_SCORE: 32.95
CAN YOU WALK INDOORS, SUCH AS AROUND YOUR HOUSE: YES
CAN YOU HAVE SEXUAL RELATIONS: NO
CAN YOU PARTICIPATE IN MODERATE RECREATIONAL ACTIVITIES LIKE GOLF, BOWLING, DANCING, DOUBLES TENNIS OR THROWING A BASEBALL OR FOOTBALL: YES
CAN YOU TAKE CARE OF YOURSELF (EAT, DRESS, BATHE, OR USE TOILET): YES
CAN YOU DO MODERATE WORK AROUND THE HOUSE LIKE VACUUMING, SWEEPING FLOORS OR CARRYING GROCERIES: YES

## 2024-05-28 ASSESSMENT — LIFESTYLE VARIABLES: SMOKING_STATUS: NONSMOKER

## 2024-05-28 ASSESSMENT — ACTIVITIES OF DAILY LIVING (ADL): ADL_SCORE: 1

## 2024-05-28 ASSESSMENT — PAIN SCALES - GENERAL: PAINLEVEL_OUTOF10: 0 - NO PAIN

## 2024-05-28 ASSESSMENT — PAIN - FUNCTIONAL ASSESSMENT: PAIN_FUNCTIONAL_ASSESSMENT: 0-10

## 2024-05-28 NOTE — H&P (VIEW-ONLY)
CPM/PAT Evaluation       Name: Zena Baxter (Zena Baxter)  /Age: 1945/ y.o.     In-Person       Chief Complaint: Left foot    HPI78 year old female for arthrodesis left foot 24. Patient c/o left foot and ankle pain that has been worsening for years. She is having difficulty walking and is ambulating with a cane. Limited ROM to left ankle / foot. Has bilateral LE ankle edema +2, left slightly more than right. Has worn braces in past, currently not using.     Cardiology: Suzan    PMH: HTN, Hyperlipidemia, Non Hodgkin's Lymphoma, Anxiety/Depression, GERD    Past Medical History:   Diagnosis Date    Anemia     Anxiety     Cholelithiasis     Depression     GERD (gastroesophageal reflux disease)     Hallux valgus (acquired), unspecified foot     Hallux valgus with bunions    Hyperlipidemia     Hypertension     Non Hodgkin's lymphoma (Multi)     Other conditions influencing health status      2    Personal history of non-Hodgkin lymphomas     History of non-Hodgkin's lymphoma    Personal history of other diseases of the nervous system and sense organs     History of macular degeneration       Past Surgical History:   Procedure Laterality Date    CATARACT EXTRACTION      CHOLECYSTECTOMY      HEMORRHOID SURGERY      MEDIPORT      MEDIPORT REMOVAL      OTHER SURGICAL HISTORY  10/13/2020    Tonsillectomy    OTHER SURGICAL HISTORY  10/13/2020    Knee replacement    OTHER SURGICAL HISTORY  10/13/2020    Rhytidectomy    OTHER SURGICAL HISTORY  10/13/2020    Dilation and curettage    OTHER SURGICAL HISTORY  10/13/2020    Salivary duct cyst excision    TOTAL ABDOMINAL HYSTERECTOMY W/ BILATERAL SALPINGOOPHORECTOMY         Patient  reports that she is not currently sexually active.    Family History   Problem Relation Name Age of Onset    Hypertension Mother      Other (OTH) Mother          ?CAD/MI/CHF    Heart failure Father         Allergies   Allergen Reactions    Enbucrilate Itching and Other      SURGICAL GLUE   Reaction: pustules, erythema, itching  ( placed on patient's surgical incision for mediport insertion)    Nut - Unspecified Swelling     Brazil nuts only, throat swelling    Latex Itching and Rash     Latex challenge positive    Kake Rash     Fresh only- perioral rash       Prior to Admission medications    Not on File      Refer to updated medication list on file      Constitutional: Negative for fever, chills, or sweats   ENMT: Negative for nasal discharge, congestion, ear pain, mouth pain, throat pain   Respiratory: Negative for cough, wheezing, shortness of breath   Cardiac: Negative for chest pain, dyspnea on exertion, palpitations   Gastrointestinal: Negative for nausea, vomiting, diarrhea, constipation, abdominal pain  Genitourinary: Negative for dysuria, flank pain, frequency, hematuria   Musculoskeletal: Negative for decreased ROM, pain, swelling, weakness   Neurological: Negative for dizziness, confusion, headache  Psychiatric: Negative for mood changes   Skin: Negative for itching, rash, ulcer    Hematologic/Lymph: Negative for bruising, easy bleeding  Allergic/Immunologic: Negative itching, sneezing, swelling       Physical Exam  Constitutional:       Appearance: Normal appearance.   HENT:      Head: Normocephalic.   Eyes:      Extraocular Movements: Extraocular movements intact.      Comments: glasses   Cardiovascular:      Rate and Rhythm: Normal rate and regular rhythm.      Heart sounds: Normal heart sounds.   Pulmonary:      Effort: Pulmonary effort is normal.      Breath sounds: Normal breath sounds.   Abdominal:      General: Bowel sounds are normal.      Palpations: Abdomen is soft.   Musculoskeletal:         General: Normal range of motion.      Cervical back: Normal range of motion.      Right lower leg: Edema present.      Left lower leg: Edema present.   Skin:     General: Skin is warm and dry.   Neurological:      Mental Status: She is alert and oriented to person,  place, and time.   Psychiatric:         Mood and Affect: Mood normal.        PAT AIRWAY:   Airway:     Neck ROM::  Full   Upper full denture (one upper implant tooth), missing some lower teeth    Anesthesia:  Post op N/V    Visit Vitals  /65   Pulse 91   Temp 35.5 °C (95.9 °F) (Temporal)   Resp 14       DASI Risk Score      Flowsheet Row Most Recent Value   DASI SCORE 32.95   METS Score (Will be calculated only when all the questions are answered) 6.8          Caprini DVT Assessment      Flowsheet Row Most Recent Value   DVT Score 8   Current Status Minor surgery planned   History Prior major surgery   Age Over 75 years   BMI 31-40 (Obesity)          Modified Frailty Index      Flowsheet Row Most Recent Value   Modified Frailty Index Calculator .0909          CHADS2 Stroke Risk  Current as of 4 minutes ago        N/A 3 to 100%: High Risk   2 to < 3%: Medium Risk   0 to < 2%: Low Risk     Last Change: N/A          This score determines the patient's risk of having a stroke if the patient has atrial fibrillation.        This score is not applicable to this patient. Components are not calculated.          Revised Cardiac Risk Index      Flowsheet Row Most Recent Value   Revised Cardiac Risk Calculator 0          Apfel Simplified Score    No data to display       Risk Analysis Index Results This Encounter         5/28/2024  1148             NO Cancer History: Patient does not indicate history of cancer    Total Risk Analysis Index Score Without Cancer: 30    Total Risk Analysis Index Score: 30          Stop Bang Score      Flowsheet Row Most Recent Value   Do you snore loudly? 0   Do you often feel tired or fatigued after your sleep? 0   Has anyone ever observed you stop breathing in your sleep? 0   Do you have or are you being treated for high blood pressure? 1   Recent BMI (Calculated) 31   Is BMI greater than 35 kg/m2? 0=No   Age older than 50 years old? 1=Yes   Is your neck circumference greater than 17  inches (Male) or 16 inches (Female)? 0   Gender - Male 0=No   STOP-BANG Total Score 2            Assessment and Plan:     78 year old female for arthrodesis left foot 6/11/24.   CBC, BMP on file 5/15/24  HgbAIC 5/15/24   5.5   EKG obtained and enclosed. Reading prolonged QT.    Cardiology office visit note on file 3/25/24 Dr. Mares  Hx Non Hodgkin's Lymphoma with chemo and radiation treatments 2019  Post op N/V  Allergy to surgical glue and latex (see above)    ASA 3

## 2024-05-28 NOTE — CPM/PAT H&P
CPM/PAT Evaluation       Name: Zena Baxter (Zena Baxter)  /Age: 1945/ y.o.     In-Person       Chief Complaint: Left foot    HPI78 year old female for arthrodesis left foot 24. Patient c/o left foot and ankle pain that has been worsening for years. She is having difficulty walking and is ambulating with a cane. Limited ROM to left ankle / foot. Has bilateral LE ankle edema +2, left slightly more than right. Has worn braces in past, currently not using.     Cardiology: Suzan    PMH: HTN, Hyperlipidemia, Non Hodgkin's Lymphoma, Anxiety/Depression, GERD    Past Medical History:   Diagnosis Date    Anemia     Anxiety     Cholelithiasis     Depression     GERD (gastroesophageal reflux disease)     Hallux valgus (acquired), unspecified foot     Hallux valgus with bunions    Hyperlipidemia     Hypertension     Non Hodgkin's lymphoma (Multi)     Other conditions influencing health status      2    Personal history of non-Hodgkin lymphomas     History of non-Hodgkin's lymphoma    Personal history of other diseases of the nervous system and sense organs     History of macular degeneration       Past Surgical History:   Procedure Laterality Date    CATARACT EXTRACTION      CHOLECYSTECTOMY      HEMORRHOID SURGERY      MEDIPORT      MEDIPORT REMOVAL      OTHER SURGICAL HISTORY  10/13/2020    Tonsillectomy    OTHER SURGICAL HISTORY  10/13/2020    Knee replacement    OTHER SURGICAL HISTORY  10/13/2020    Rhytidectomy    OTHER SURGICAL HISTORY  10/13/2020    Dilation and curettage    OTHER SURGICAL HISTORY  10/13/2020    Salivary duct cyst excision    TOTAL ABDOMINAL HYSTERECTOMY W/ BILATERAL SALPINGOOPHORECTOMY         Patient  reports that she is not currently sexually active.    Family History   Problem Relation Name Age of Onset    Hypertension Mother      Other (OTH) Mother          ?CAD/MI/CHF    Heart failure Father         Allergies   Allergen Reactions    Enbucrilate Itching and Other      SURGICAL GLUE   Reaction: pustules, erythema, itching  ( placed on patient's surgical incision for mediport insertion)    Nut - Unspecified Swelling     Brazil nuts only, throat swelling    Latex Itching and Rash     Latex challenge positive    Ivan Rash     Fresh only- perioral rash       Prior to Admission medications    Not on File      Refer to updated medication list on file      Constitutional: Negative for fever, chills, or sweats   ENMT: Negative for nasal discharge, congestion, ear pain, mouth pain, throat pain   Respiratory: Negative for cough, wheezing, shortness of breath   Cardiac: Negative for chest pain, dyspnea on exertion, palpitations   Gastrointestinal: Negative for nausea, vomiting, diarrhea, constipation, abdominal pain  Genitourinary: Negative for dysuria, flank pain, frequency, hematuria   Musculoskeletal: Negative for decreased ROM, pain, swelling, weakness   Neurological: Negative for dizziness, confusion, headache  Psychiatric: Negative for mood changes   Skin: Negative for itching, rash, ulcer    Hematologic/Lymph: Negative for bruising, easy bleeding  Allergic/Immunologic: Negative itching, sneezing, swelling       Physical Exam  Constitutional:       Appearance: Normal appearance.   HENT:      Head: Normocephalic.   Eyes:      Extraocular Movements: Extraocular movements intact.      Comments: glasses   Cardiovascular:      Rate and Rhythm: Normal rate and regular rhythm.      Heart sounds: Normal heart sounds.   Pulmonary:      Effort: Pulmonary effort is normal.      Breath sounds: Normal breath sounds.   Abdominal:      General: Bowel sounds are normal.      Palpations: Abdomen is soft.   Musculoskeletal:         General: Normal range of motion.      Cervical back: Normal range of motion.      Right lower leg: Edema present.      Left lower leg: Edema present.   Skin:     General: Skin is warm and dry.   Neurological:      Mental Status: She is alert and oriented to person,  place, and time.   Psychiatric:         Mood and Affect: Mood normal.        PAT AIRWAY:   Airway:     Neck ROM::  Full   Upper full denture (one upper implant tooth), missing some lower teeth    Anesthesia:  Post op N/V    Visit Vitals  /65   Pulse 91   Temp 35.5 °C (95.9 °F) (Temporal)   Resp 14       DASI Risk Score      Flowsheet Row Most Recent Value   DASI SCORE 32.95   METS Score (Will be calculated only when all the questions are answered) 6.8          Caprini DVT Assessment      Flowsheet Row Most Recent Value   DVT Score 8   Current Status Minor surgery planned   History Prior major surgery   Age Over 75 years   BMI 31-40 (Obesity)          Modified Frailty Index      Flowsheet Row Most Recent Value   Modified Frailty Index Calculator .0909          CHADS2 Stroke Risk  Current as of 4 minutes ago        N/A 3 to 100%: High Risk   2 to < 3%: Medium Risk   0 to < 2%: Low Risk     Last Change: N/A          This score determines the patient's risk of having a stroke if the patient has atrial fibrillation.        This score is not applicable to this patient. Components are not calculated.          Revised Cardiac Risk Index      Flowsheet Row Most Recent Value   Revised Cardiac Risk Calculator 0          Apfel Simplified Score    No data to display       Risk Analysis Index Results This Encounter         5/28/2024  1148             NO Cancer History: Patient does not indicate history of cancer    Total Risk Analysis Index Score Without Cancer: 30    Total Risk Analysis Index Score: 30          Stop Bang Score      Flowsheet Row Most Recent Value   Do you snore loudly? 0   Do you often feel tired or fatigued after your sleep? 0   Has anyone ever observed you stop breathing in your sleep? 0   Do you have or are you being treated for high blood pressure? 1   Recent BMI (Calculated) 31   Is BMI greater than 35 kg/m2? 0=No   Age older than 50 years old? 1=Yes   Is your neck circumference greater than 17  inches (Male) or 16 inches (Female)? 0   Gender - Male 0=No   STOP-BANG Total Score 2            Assessment and Plan:     78 year old female for arthrodesis left foot 6/11/24.   CBC, BMP on file 5/15/24  HgbAIC 5/15/24   5.5   EKG obtained and enclosed. Reading prolonged QT.    Cardiology office visit note on file 3/25/24 Dr. Mares  Hx Non Hodgkin's Lymphoma with chemo and radiation treatments 2019  Post op N/V  Allergy to surgical glue and latex (see above)    ASA 3

## 2024-05-28 NOTE — PREPROCEDURE INSTRUCTIONS
Medication List            Accurate as of May 28, 2024 11:54 AM. Always use your most recent med list.                FISH OIL ORAL  Medication Adjustments for Surgery: Stop 7 days before surgery     fluticasone 50 mcg/actuation nasal spray  Commonly known as: Flonase  Medication Adjustments for Surgery: Take morning of surgery with sip of water, no other fluids     hydroCHLOROthiazide 12.5 mg capsule  Commonly known as: Microzide  Medication Adjustments for Surgery: Take morning of surgery with sip of water, no other fluids     lisinopriL-hydrochlorothiazide 10-12.5 mg tablet  Medication Adjustments for Surgery: Stop 1 day before surgery     omeprazole 20 mg DR capsule  Commonly known as: PriLOSEC  Medication Adjustments for Surgery: Take morning of surgery with sip of water, no other fluids     polyethylene glycol 17 gram packet  Commonly known as: Glycolax, Miralax  Medication Adjustments for Surgery: Stop 1 day before surgery     PROBIOTIC BLEND ORAL  Medication Adjustments for Surgery: Stop 7 days before surgery     sertraline 50 mg tablet  Commonly known as: Zoloft  Medication Adjustments for Surgery: Take morning of surgery with sip of water, no other fluids     simvastatin 20 mg tablet  Commonly known as: Zocor  Medication Adjustments for Surgery: Take morning of surgery with sip of water, no other fluids     Vitamin D3 50 MCG (2000 UT) tablet  Generic drug: cholecalciferol  Medication Adjustments for Surgery: Stop 7 days before surgery                  PRE-OPERATIVE INSTRUCTIONS    You will receive notification one business day prior to your procedure to confirm your arrival time. It is important that you answer your phone and/or check your messages during this time. If you do not hear from the surgery center by 5 pm. the day before your procedure, please call [phone].     Please enter the building through the Outpatient entrance and take the elevator off the lobby to the 2nd floor then check in at  the Outpatient Surgery desk on the 2nd floor.    INSTRUCTIONS:  Talk to your surgeon for instructions if you should stop your aspirin, blood thinner, or diabetes medicines.  DO NOT take any multivitamins or over the counter supplements for 7-10 days before surgery.  If not being admitted, you must have an adult immediately available to drive you home after surgery. We also highly recommend you have someone stay with you for the entire day and night of your surgery.  For children having surgery, a parent or legal guardian must accompany them to the surgery center. If this is not possible, please call [phone] to make additional arrangements.  For adults who are unable to consent or make medical decisions for themselves, a legal guardian or Power of  must accompany them to the surgery center. If this is not possible, please call [phone] to make additional arrangements.  Wear comfortable, loose fitting clothing.  All jewelry and piercings must be removed. If you are unable to remove an item or have a dermal piercing, please be sure to tell the nurse when you arrive for surgery.  Nail polish and make-up must be removed.  Avoid smoking or consuming alcohol for 24 hours before surgery.  To help prevent infection, please take a shower/bath and wash your hair the night before and/or morning of surgery (or follow other specific bathing instructions provided).    Preoperative Fasting Guidelines    Why must I stop eating and drinking near surgery time?  With sedation, food or liquid in your stomach can enter your lungs causing serious complications  Increases nausea and vomiting    When do I need to stop eating and drinking before my surgery?  Do not eat any solid food after midnight the night before your surgery/procedure unless otherwise instructed by your surgeon.   You may have up to 13.5 ounces of clear liquid until TWO hours before your instructed arrival time to the hospital.  This includes water, black  tea/coffee, (no milk or cream) apple juice, and electrolyte drinks (Gatorade).   You may chew gum until TWO hours before your surgery/procedure      If applicable, notify your surgeons office immediately of any new skin changes that occur to the surgical limb.      If you have any questions or concerns, please call Pre-Admission Testing at [phone].         Home Preoperative Antibacterial Shower with Chlorhexidine gluconate (CHG)     What is a home preoperative antibacterial shower?  This shower is a way of cleaning the skin with a germ killing solution before surgery. The solution contains chlorhexidine gluconate, commonly known as CHG. CHG is a skin cleanser with germ killing ability. Let your doctor know if you are allergic to chlorhexidine.    Why do I need to take a preoperative antibacterial shower?  Skin is not sterile. It is best to try to make your skin as free of germs as possible before surgery. Proper cleansing with a germ killing soap before surgery can lower the number of germs on your skin. This helps to reduce the risk of infection at the surgical site. Following the instructions listed below will help you prepare your skin for surgery.    How do I use the solution?  Begin using your CHG soap the night before and again the morning of your procedure.   Do not shave the day before or day of surgery.  Remove all jewelry until after surgery. Take off rings and take out all body-piercing jewelry.  Wash your face and hair with normal soap and shampoo before you use the CHG soap.  Apply the CHG solution to a clean wet washcloth. Move away from the water to avoid premature rinsing of the CHG soap as you are applying. Firmly lather your entire body from the neck down. Do not use CHG on your face, eyes, ears, or genitals.   Pay special attention to the area where your incisions will be located.  Do not scrub your skin too hard.  It is important to allow the CHG soap to sit on your skin for 3-5  minutes.  Rinse the solution off your body completely. Do not wash with your normal soap after the CHG soap solution.  Pat yourself dry with a clean, soft towel.  Do not apply powders, lotions or deodorants as these might block how the CHG soap works.   Dress in clean clothing.  Be sure to sleep with clean, freshly laundered sheets.  Be aware that CHG can cause stains on fabric. Rinse your washcloth and other linens that have contact with CHG completely. Use only non-chlorine detergents to launder the items used.

## 2024-05-29 ENCOUNTER — TREATMENT (OUTPATIENT)
Dept: PHYSICAL THERAPY | Facility: CLINIC | Age: 79
End: 2024-05-29
Payer: MEDICARE

## 2024-05-29 DIAGNOSIS — R26.2 DIFFICULTY WALKING: ICD-10-CM

## 2024-05-29 DIAGNOSIS — R26.89 BALANCE PROBLEM: Primary | ICD-10-CM

## 2024-05-29 PROCEDURE — 97530 THERAPEUTIC ACTIVITIES: CPT | Mod: GP,KX

## 2024-05-29 NOTE — PROGRESS NOTES
Physical Therapy Treatment     Patient Name: Zena Baxter  MRN: [id number]  Today's Date: 5/29/2024  Visit #: 17  Insurance:  90 DAY 5/27/24   Time Calculation  Start Time: 1714  Stop Time: 1741  Time Calculation (min): 27 min    1. Balance problem        2. Difficulty walking                      ASSESSMENT:  Session spent in scooter training for mobility after surgery on 6/11.  Still demos challenges.  Will be making accommodations at home and will practice more with knee scooter in clinic next time.  Receptive.    GOALS:  [place] in HEP  No falls during POC  Improvement in the following outcome measures:  - 5xSTS < 15 seconds without Ues  - 6MWT 360m  - Gait Speed 1.0m/s  - Short Form Gavin 23/28  - LEFS improvement by 9pt to match MCID  ^^ unmet but improving    PLAN:  One more session, then discharge from this POC d/t change in status from surgery.    SUBJECTIVE:  Forgot about last week.  Had a fall in the garage prior to the last time when was here.  Surgery for L ankle scheduled for June 11.  Brought in knee scooter for practice    OBJECTIVE:    TREATMENT:    Therapeutic Activity (88738): 27  minutes    Knee Scooter Training -- performing with education for set-up, positioning, and use.  Most significantly challenged by balance, propulsion, and especially rising from sitting with R LE only.  Educated upon this.  Educated that needs to find chair at home that is at an appropriate height for after surgery and for R LE STS training at home.  Encouraged sets x 5 multiple times throughout day between now and June 11.  Also encouraged to get a bedside commode to place over the toilet for ease of toilet transfers.  Receptive.

## 2024-05-30 LAB
ATRIAL RATE: 78 BPM
P AXIS: 23 DEGREES
P OFFSET: 208 MS
P ONSET: 148 MS
PR INTERVAL: 156 MS
Q ONSET: 226 MS
QRS COUNT: 13 BEATS
QRS DURATION: 102 MS
QT INTERVAL: 426 MS
QTC CALCULATION(BAZETT): 485 MS
QTC FREDERICIA: 465 MS
R AXIS: -20 DEGREES
T AXIS: -4 DEGREES
T OFFSET: 439 MS
VENTRICULAR RATE: 78 BPM

## 2024-06-05 ENCOUNTER — TREATMENT (OUTPATIENT)
Dept: PHYSICAL THERAPY | Facility: CLINIC | Age: 79
End: 2024-06-05
Payer: MEDICARE

## 2024-06-05 DIAGNOSIS — R26.89 BALANCE PROBLEM: Primary | ICD-10-CM

## 2024-06-05 DIAGNOSIS — R26.2 DIFFICULTY WALKING: ICD-10-CM

## 2024-06-05 PROCEDURE — 97530 THERAPEUTIC ACTIVITIES: CPT | Mod: GP

## 2024-06-05 NOTE — PROGRESS NOTES
Physical Therapy Treatment     Patient Name: Zena Baxter  MRN: [id number]  Today's Date: 6/5/2024  Visit #: 18  Insurance:  90 DAY 5/27/24   Time Calculation  Start Time: 1409  Stop Time: 1444  Time Calculation (min): 35 min    1. Balance problem        2. Difficulty walking                ASSESSMENT:  Session spent in scooter training for mobility after surgery on 6/11.  Much improved.  Receptive to all discussion.  Discharge from PT at this time.    GOALS:  [place] in HEP  No falls during POC  Improvement in the following outcome measures:  - 5xSTS < 15 seconds without Ues  - 6MWT 360m  - Gait Speed 1.0m/s  - Short Form Gavin 23/28  - LEFS improvement by 9pt to match MCID  ^^ unmet but improving    PLAN:  Discharge from this POC.  Will see colleague for ankle POC.    SUBJECTIVE:  Very tired because of work being done in house prior to surgery next week.  Been practicing doing stands at home.  Got commode over toilet.    OBJECTIVE:    TREATMENT:    Therapeutic Activity (22025):  35  minutes    Knee Scooter Training -- performing with education for set-up, positioning, and use.  Much improved propulsion and transfer his date..  Educated upon this.  Discussed the importance between now and next week to practice with knee scooter around the house and transferring from surface to surface.  Discussed at length car transfers.  Discussed threshold at home getting in/out of house.

## 2024-06-10 ENCOUNTER — ANESTHESIA EVENT (OUTPATIENT)
Dept: OPERATING ROOM | Facility: HOSPITAL | Age: 79
End: 2024-06-10
Payer: MEDICARE

## 2024-06-11 ENCOUNTER — APPOINTMENT (OUTPATIENT)
Dept: RADIOLOGY | Facility: HOSPITAL | Age: 79
End: 2024-06-11
Payer: MEDICARE

## 2024-06-11 ENCOUNTER — ANESTHESIA (OUTPATIENT)
Dept: OPERATING ROOM | Facility: HOSPITAL | Age: 79
End: 2024-06-11
Payer: MEDICARE

## 2024-06-11 ENCOUNTER — HOSPITAL ENCOUNTER (OUTPATIENT)
Facility: HOSPITAL | Age: 79
Discharge: NURSING, REHAB OR LONG-TERM CARE | End: 2024-06-12
Attending: ORTHOPAEDIC SURGERY | Admitting: ORTHOPAEDIC SURGERY
Payer: MEDICARE

## 2024-06-11 DIAGNOSIS — M21.42 PES PLANUS OF LEFT FOOT: Primary | ICD-10-CM

## 2024-06-11 PROCEDURE — 2500000005 HC RX 250 GENERAL PHARMACY W/O HCPCS: Performed by: NURSE ANESTHETIST, CERTIFIED REGISTERED

## 2024-06-11 PROCEDURE — 3600000008 HC OR TIME - EACH INCREMENTAL 1 MINUTE - PROCEDURE LEVEL THREE: Performed by: ORTHOPAEDIC SURGERY

## 2024-06-11 PROCEDURE — 3700000001 HC GENERAL ANESTHESIA TIME - INITIAL BASE CHARGE: Performed by: ORTHOPAEDIC SURGERY

## 2024-06-11 PROCEDURE — G0378 HOSPITAL OBSERVATION PER HR: HCPCS

## 2024-06-11 PROCEDURE — 2500000004 HC RX 250 GENERAL PHARMACY W/ HCPCS (ALT 636 FOR OP/ED): Performed by: NURSE ANESTHETIST, CERTIFIED REGISTERED

## 2024-06-11 PROCEDURE — C1713 ANCHOR/SCREW BN/BN,TIS/BN: HCPCS | Performed by: ORTHOPAEDIC SURGERY

## 2024-06-11 PROCEDURE — 7100000002 HC RECOVERY ROOM TIME - EACH INCREMENTAL 1 MINUTE: Performed by: ORTHOPAEDIC SURGERY

## 2024-06-11 PROCEDURE — 2780000003 HC OR 278 NO HCPCS: Performed by: ORTHOPAEDIC SURGERY

## 2024-06-11 PROCEDURE — 7100000001 HC RECOVERY ROOM TIME - INITIAL BASE CHARGE: Performed by: ORTHOPAEDIC SURGERY

## 2024-06-11 PROCEDURE — 2720000007 HC OR 272 NO HCPCS: Performed by: ORTHOPAEDIC SURGERY

## 2024-06-11 PROCEDURE — 3700000002 HC GENERAL ANESTHESIA TIME - EACH INCREMENTAL 1 MINUTE: Performed by: ORTHOPAEDIC SURGERY

## 2024-06-11 PROCEDURE — 2500000001 HC RX 250 WO HCPCS SELF ADMINISTERED DRUGS (ALT 637 FOR MEDICARE OP): Performed by: ORTHOPAEDIC SURGERY

## 2024-06-11 PROCEDURE — 3600000003 HC OR TIME - INITIAL BASE CHARGE - PROCEDURE LEVEL THREE: Performed by: ORTHOPAEDIC SURGERY

## 2024-06-11 PROCEDURE — 2500000004 HC RX 250 GENERAL PHARMACY W/ HCPCS (ALT 636 FOR OP/ED): Mod: JZ | Performed by: ORTHOPAEDIC SURGERY

## 2024-06-11 PROCEDURE — 96372 THER/PROPH/DIAG INJ SC/IM: CPT | Mod: 59 | Performed by: ORTHOPAEDIC SURGERY

## 2024-06-11 PROCEDURE — 2500000004 HC RX 250 GENERAL PHARMACY W/ HCPCS (ALT 636 FOR OP/ED)

## 2024-06-11 PROCEDURE — 2500000002 HC RX 250 W HCPCS SELF ADMINISTERED DRUGS (ALT 637 FOR MEDICARE OP, ALT 636 FOR OP/ED): Mod: MUE | Performed by: ORTHOPAEDIC SURGERY

## 2024-06-11 PROCEDURE — C1889 IMPLANT/INSERT DEVICE, NOC: HCPCS | Performed by: ORTHOPAEDIC SURGERY

## 2024-06-11 PROCEDURE — 2500000004 HC RX 250 GENERAL PHARMACY W/ HCPCS (ALT 636 FOR OP/ED): Performed by: ORTHOPAEDIC SURGERY

## 2024-06-11 DEVICE — IMPLANTABLE DEVICE: Type: IMPLANTABLE DEVICE | Site: FOOT | Status: FUNCTIONAL

## 2024-06-11 RX ORDER — OXYCODONE AND ACETAMINOPHEN 5; 325 MG/1; MG/1
1 TABLET ORAL EVERY 4 HOURS PRN
Status: DISCONTINUED | OUTPATIENT
Start: 2024-06-11 | End: 2024-06-11 | Stop reason: HOSPADM

## 2024-06-11 RX ORDER — HYDROCHLOROTHIAZIDE 12.5 MG/1
12.5 TABLET ORAL EVERY MORNING
Status: DISCONTINUED | OUTPATIENT
Start: 2024-06-12 | End: 2024-06-13 | Stop reason: HOSPADM

## 2024-06-11 RX ORDER — DEXAMETHASONE SODIUM PHOSPHATE 10 MG/ML
INJECTION INTRAMUSCULAR; INTRAVENOUS AS NEEDED
Status: DISCONTINUED | OUTPATIENT
Start: 2024-06-11 | End: 2024-06-11

## 2024-06-11 RX ORDER — SERTRALINE HYDROCHLORIDE 50 MG/1
100 TABLET, FILM COATED ORAL DAILY
Status: DISCONTINUED | OUTPATIENT
Start: 2024-06-11 | End: 2024-06-13 | Stop reason: HOSPADM

## 2024-06-11 RX ORDER — LIDOCAINE HYDROCHLORIDE 10 MG/ML
0.1 INJECTION INFILTRATION; PERINEURAL ONCE
Status: DISCONTINUED | OUTPATIENT
Start: 2024-06-11 | End: 2024-06-11 | Stop reason: HOSPADM

## 2024-06-11 RX ORDER — HYDROMORPHONE HYDROCHLORIDE 0.2 MG/ML
0.2 INJECTION INTRAMUSCULAR; INTRAVENOUS; SUBCUTANEOUS EVERY 5 MIN PRN
Status: DISCONTINUED | OUTPATIENT
Start: 2024-06-11 | End: 2024-06-11 | Stop reason: HOSPADM

## 2024-06-11 RX ORDER — ENOXAPARIN SODIUM 100 MG/ML
30 INJECTION SUBCUTANEOUS EVERY 12 HOURS SCHEDULED
Status: DISCONTINUED | OUTPATIENT
Start: 2024-06-11 | End: 2024-06-13 | Stop reason: HOSPADM

## 2024-06-11 RX ORDER — NALOXONE HYDROCHLORIDE 0.4 MG/ML
0.2 INJECTION, SOLUTION INTRAMUSCULAR; INTRAVENOUS; SUBCUTANEOUS EVERY 5 MIN PRN
Status: DISCONTINUED | OUTPATIENT
Start: 2024-06-11 | End: 2024-06-13 | Stop reason: HOSPADM

## 2024-06-11 RX ORDER — OXYCODONE HYDROCHLORIDE 5 MG/1
5 TABLET ORAL EVERY 4 HOURS PRN
Status: DISCONTINUED | OUTPATIENT
Start: 2024-06-11 | End: 2024-06-11 | Stop reason: HOSPADM

## 2024-06-11 RX ORDER — ONDANSETRON 4 MG/1
4 TABLET, FILM COATED ORAL EVERY 8 HOURS PRN
Status: DISCONTINUED | OUTPATIENT
Start: 2024-06-11 | End: 2024-06-13 | Stop reason: HOSPADM

## 2024-06-11 RX ORDER — PANTOPRAZOLE SODIUM 40 MG/1
40 TABLET, DELAYED RELEASE ORAL
Status: DISCONTINUED | OUTPATIENT
Start: 2024-06-12 | End: 2024-06-13 | Stop reason: HOSPADM

## 2024-06-11 RX ORDER — LIDOCAINE HYDROCHLORIDE 20 MG/ML
INJECTION, SOLUTION INFILTRATION; PERINEURAL AS NEEDED
Status: DISCONTINUED | OUTPATIENT
Start: 2024-06-11 | End: 2024-06-11

## 2024-06-11 RX ORDER — SODIUM CHLORIDE, SODIUM LACTATE, POTASSIUM CHLORIDE, CALCIUM CHLORIDE 600; 310; 30; 20 MG/100ML; MG/100ML; MG/100ML; MG/100ML
INJECTION, SOLUTION INTRAVENOUS CONTINUOUS PRN
Status: DISCONTINUED | OUTPATIENT
Start: 2024-06-11 | End: 2024-06-11

## 2024-06-11 RX ORDER — NORETHINDRONE AND ETHINYL ESTRADIOL 0.5-0.035
KIT ORAL AS NEEDED
Status: DISCONTINUED | OUTPATIENT
Start: 2024-06-11 | End: 2024-06-11

## 2024-06-11 RX ORDER — SIMVASTATIN 20 MG/1
20 TABLET, FILM COATED ORAL NIGHTLY
Status: DISCONTINUED | OUTPATIENT
Start: 2024-06-11 | End: 2024-06-13 | Stop reason: HOSPADM

## 2024-06-11 RX ORDER — CEFAZOLIN SODIUM 2 G/100ML
2 INJECTION, SOLUTION INTRAVENOUS ONCE
Status: COMPLETED | OUTPATIENT
Start: 2024-06-11 | End: 2024-06-11

## 2024-06-11 RX ORDER — SODIUM CHLORIDE, SODIUM LACTATE, POTASSIUM CHLORIDE, CALCIUM CHLORIDE 600; 310; 30; 20 MG/100ML; MG/100ML; MG/100ML; MG/100ML
100 INJECTION, SOLUTION INTRAVENOUS CONTINUOUS
Status: DISCONTINUED | OUTPATIENT
Start: 2024-06-11 | End: 2024-06-11 | Stop reason: HOSPADM

## 2024-06-11 RX ORDER — ONDANSETRON HYDROCHLORIDE 2 MG/ML
INJECTION, SOLUTION INTRAVENOUS AS NEEDED
Status: DISCONTINUED | OUTPATIENT
Start: 2024-06-11 | End: 2024-06-11

## 2024-06-11 RX ORDER — ONDANSETRON HYDROCHLORIDE 2 MG/ML
4 INJECTION, SOLUTION INTRAVENOUS EVERY 8 HOURS PRN
Status: DISCONTINUED | OUTPATIENT
Start: 2024-06-11 | End: 2024-06-13 | Stop reason: HOSPADM

## 2024-06-11 RX ORDER — PROPOFOL 10 MG/ML
INJECTION, EMULSION INTRAVENOUS AS NEEDED
Status: DISCONTINUED | OUTPATIENT
Start: 2024-06-11 | End: 2024-06-11

## 2024-06-11 RX ORDER — LABETALOL HYDROCHLORIDE 5 MG/ML
5 INJECTION, SOLUTION INTRAVENOUS ONCE AS NEEDED
Status: DISCONTINUED | OUTPATIENT
Start: 2024-06-11 | End: 2024-06-11 | Stop reason: HOSPADM

## 2024-06-11 RX ORDER — FENTANYL CITRATE 50 UG/ML
50 INJECTION, SOLUTION INTRAMUSCULAR; INTRAVENOUS EVERY 5 MIN PRN
Status: DISCONTINUED | OUTPATIENT
Start: 2024-06-11 | End: 2024-06-11 | Stop reason: HOSPADM

## 2024-06-11 RX ORDER — HYDROMORPHONE HYDROCHLORIDE 1 MG/ML
INJECTION, SOLUTION INTRAMUSCULAR; INTRAVENOUS; SUBCUTANEOUS AS NEEDED
Status: DISCONTINUED | OUTPATIENT
Start: 2024-06-11 | End: 2024-06-11

## 2024-06-11 RX ORDER — MIDAZOLAM HYDROCHLORIDE 1 MG/ML
INJECTION, SOLUTION INTRAMUSCULAR; INTRAVENOUS AS NEEDED
Status: DISCONTINUED | OUTPATIENT
Start: 2024-06-11 | End: 2024-06-11

## 2024-06-11 RX ORDER — MEPERIDINE HYDROCHLORIDE 50 MG/ML
12.5 INJECTION INTRAMUSCULAR; INTRAVENOUS; SUBCUTANEOUS EVERY 10 MIN PRN
Status: DISCONTINUED | OUTPATIENT
Start: 2024-06-11 | End: 2024-06-11 | Stop reason: HOSPADM

## 2024-06-11 RX ORDER — SENNOSIDES 8.6 MG/1
2 TABLET ORAL 2 TIMES DAILY
Status: DISCONTINUED | OUTPATIENT
Start: 2024-06-11 | End: 2024-06-13 | Stop reason: HOSPADM

## 2024-06-11 RX ORDER — METOCLOPRAMIDE HYDROCHLORIDE 5 MG/ML
10 INJECTION INTRAMUSCULAR; INTRAVENOUS ONCE AS NEEDED
Status: DISCONTINUED | OUTPATIENT
Start: 2024-06-11 | End: 2024-06-11 | Stop reason: HOSPADM

## 2024-06-11 RX ORDER — HYDRALAZINE HYDROCHLORIDE 20 MG/ML
INJECTION INTRAMUSCULAR; INTRAVENOUS AS NEEDED
Status: DISCONTINUED | OUTPATIENT
Start: 2024-06-11 | End: 2024-06-11

## 2024-06-11 RX ORDER — POLYETHYLENE GLYCOL 3350 17 G/17G
17 POWDER, FOR SOLUTION ORAL DAILY
Status: DISCONTINUED | OUTPATIENT
Start: 2024-06-11 | End: 2024-06-13 | Stop reason: HOSPADM

## 2024-06-11 RX ORDER — PHENYLEPHRINE 10 MG/250 ML(40 MCG/ML)IN 0.9 % SOD.CHLORIDE INTRAVENOUS
CONTINUOUS PRN
Status: DISCONTINUED | OUTPATIENT
Start: 2024-06-11 | End: 2024-06-11

## 2024-06-11 RX ORDER — FLUTICASONE PROPIONATE 50 MCG
2 SPRAY, SUSPENSION (ML) NASAL DAILY
Status: DISCONTINUED | OUTPATIENT
Start: 2024-06-11 | End: 2024-06-13 | Stop reason: HOSPADM

## 2024-06-11 RX ORDER — KETOROLAC TROMETHAMINE 15 MG/ML
15 INJECTION, SOLUTION INTRAMUSCULAR; INTRAVENOUS EVERY 6 HOURS PRN
Status: DISCONTINUED | OUTPATIENT
Start: 2024-06-11 | End: 2024-06-12

## 2024-06-11 RX ORDER — FENTANYL CITRATE 50 UG/ML
INJECTION, SOLUTION INTRAMUSCULAR; INTRAVENOUS AS NEEDED
Status: DISCONTINUED | OUTPATIENT
Start: 2024-06-11 | End: 2024-06-11

## 2024-06-11 RX ORDER — PHENYLEPHRINE HCL IN 0.9% NACL 1 MG/10 ML
SYRINGE (ML) INTRAVENOUS AS NEEDED
Status: DISCONTINUED | OUTPATIENT
Start: 2024-06-11 | End: 2024-06-11

## 2024-06-11 RX ORDER — ONDANSETRON HYDROCHLORIDE 2 MG/ML
4 INJECTION, SOLUTION INTRAVENOUS ONCE AS NEEDED
Status: DISCONTINUED | OUTPATIENT
Start: 2024-06-11 | End: 2024-06-11 | Stop reason: HOSPADM

## 2024-06-11 RX ORDER — CEFAZOLIN SODIUM 2 G/100ML
2 INJECTION, SOLUTION INTRAVENOUS EVERY 8 HOURS
Status: COMPLETED | OUTPATIENT
Start: 2024-06-12 | End: 2024-06-12

## 2024-06-11 RX ORDER — OXYCODONE AND ACETAMINOPHEN 5; 325 MG/1; MG/1
0.5 TABLET ORAL EVERY 4 HOURS PRN
Status: DISCONTINUED | OUTPATIENT
Start: 2024-06-11 | End: 2024-06-12

## 2024-06-11 RX ADMIN — LIDOCAINE HYDROCHLORIDE 60 MG: 20 INJECTION, SOLUTION INFILTRATION; PERINEURAL at 11:55

## 2024-06-11 RX ADMIN — Medication 100 MCG: at 12:42

## 2024-06-11 RX ADMIN — HYDROMORPHONE HYDROCHLORIDE 0.25 MG: 1 INJECTION, SOLUTION INTRAMUSCULAR; INTRAVENOUS; SUBCUTANEOUS at 15:50

## 2024-06-11 RX ADMIN — PROPOFOL 50 MCG/KG/MIN: 10 INJECTION, EMULSION INTRAVENOUS at 12:05

## 2024-06-11 RX ADMIN — FENTANYL CITRATE 25 MCG: 50 INJECTION, SOLUTION INTRAMUSCULAR; INTRAVENOUS at 12:01

## 2024-06-11 RX ADMIN — PROPOFOL 180 MG: 10 INJECTION, EMULSION INTRAVENOUS at 11:55

## 2024-06-11 RX ADMIN — MIDAZOLAM 1 MG: 1 INJECTION INTRAMUSCULAR; INTRAVENOUS at 11:51

## 2024-06-11 RX ADMIN — FENTANYL CITRATE 25 MCG: 50 INJECTION, SOLUTION INTRAMUSCULAR; INTRAVENOUS at 11:59

## 2024-06-11 RX ADMIN — DEXAMETHASONE SODIUM PHOSPHATE 10 MG: 10 INJECTION INTRAMUSCULAR; INTRAVENOUS at 11:58

## 2024-06-11 RX ADMIN — FENTANYL CITRATE 25 MCG: 50 INJECTION, SOLUTION INTRAMUSCULAR; INTRAVENOUS at 13:22

## 2024-06-11 RX ADMIN — FENTANYL CITRATE 50 MCG: 50 INJECTION, SOLUTION INTRAMUSCULAR; INTRAVENOUS at 13:45

## 2024-06-11 RX ADMIN — Medication 150 MCG: at 14:25

## 2024-06-11 RX ADMIN — Medication 0.1 MCG/KG/MIN: at 14:39

## 2024-06-11 RX ADMIN — CEFAZOLIN SODIUM 2 G: 2 INJECTION, SOLUTION INTRAVENOUS at 16:04

## 2024-06-11 RX ADMIN — EPHEDRINE SULFATE 10 MG: 50 INJECTION, SOLUTION INTRAVENOUS at 12:10

## 2024-06-11 RX ADMIN — CEFAZOLIN SODIUM 2 G: 2 INJECTION, SOLUTION INTRAVENOUS at 12:06

## 2024-06-11 RX ADMIN — ONDANSETRON 4 MG: 2 INJECTION INTRAMUSCULAR; INTRAVENOUS at 16:04

## 2024-06-11 RX ADMIN — FENTANYL CITRATE 50 MCG: 50 INJECTION, SOLUTION INTRAMUSCULAR; INTRAVENOUS at 11:51

## 2024-06-11 RX ADMIN — Medication 100 MCG: at 12:52

## 2024-06-11 RX ADMIN — EPHEDRINE SULFATE 15 MG: 50 INJECTION, SOLUTION INTRAVENOUS at 12:15

## 2024-06-11 RX ADMIN — FENTANYL CITRATE 25 MCG: 50 INJECTION, SOLUTION INTRAMUSCULAR; INTRAVENOUS at 13:38

## 2024-06-11 RX ADMIN — HYDRALAZINE HYDROCHLORIDE 5 MG: 20 INJECTION INTRAMUSCULAR; INTRAVENOUS at 14:01

## 2024-06-11 RX ADMIN — Medication 150 MCG: at 14:28

## 2024-06-11 RX ADMIN — SODIUM CHLORIDE, POTASSIUM CHLORIDE, SODIUM LACTATE AND CALCIUM CHLORIDE: 600; 310; 30; 20 INJECTION, SOLUTION INTRAVENOUS at 11:51

## 2024-06-11 RX ADMIN — Medication 150 MCG: at 14:40

## 2024-06-11 RX ADMIN — HYDRALAZINE HYDROCHLORIDE 5 MG: 20 INJECTION INTRAMUSCULAR; INTRAVENOUS at 14:10

## 2024-06-11 RX ADMIN — HYDROMORPHONE HYDROCHLORIDE 0.25 MG: 1 INJECTION, SOLUTION INTRAMUSCULAR; INTRAVENOUS; SUBCUTANEOUS at 16:44

## 2024-06-11 RX ADMIN — SODIUM CHLORIDE, POTASSIUM CHLORIDE, SODIUM LACTATE AND CALCIUM CHLORIDE: 600; 310; 30; 20 INJECTION, SOLUTION INTRAVENOUS at 13:35

## 2024-06-11 SDOH — SOCIAL STABILITY: SOCIAL INSECURITY: DOES ANYONE TRY TO KEEP YOU FROM HAVING/CONTACTING OTHER FRIENDS OR DOING THINGS OUTSIDE YOUR HOME?: NO

## 2024-06-11 SDOH — SOCIAL STABILITY: SOCIAL INSECURITY: ARE YOU OR HAVE YOU BEEN THREATENED OR ABUSED PHYSICALLY, EMOTIONALLY, OR SEXUALLY BY ANYONE?: NO

## 2024-06-11 SDOH — SOCIAL STABILITY: SOCIAL INSECURITY: HAVE YOU HAD THOUGHTS OF HARMING ANYONE ELSE?: NO

## 2024-06-11 SDOH — HEALTH STABILITY: MENTAL HEALTH: CURRENT SMOKER: 0

## 2024-06-11 SDOH — SOCIAL STABILITY: SOCIAL INSECURITY: DO YOU FEEL ANYONE HAS EXPLOITED OR TAKEN ADVANTAGE OF YOU FINANCIALLY OR OF YOUR PERSONAL PROPERTY?: NO

## 2024-06-11 SDOH — SOCIAL STABILITY: SOCIAL INSECURITY: ARE THERE ANY APPARENT SIGNS OF INJURIES/BEHAVIORS THAT COULD BE RELATED TO ABUSE/NEGLECT?: NO

## 2024-06-11 SDOH — SOCIAL STABILITY: SOCIAL INSECURITY: HAVE YOU HAD ANY THOUGHTS OF HARMING ANYONE ELSE?: NO

## 2024-06-11 SDOH — SOCIAL STABILITY: SOCIAL INSECURITY: WERE YOU ABLE TO COMPLETE ALL THE BEHAVIORAL HEALTH SCREENINGS?: YES

## 2024-06-11 SDOH — SOCIAL STABILITY: SOCIAL INSECURITY: DO YOU FEEL UNSAFE GOING BACK TO THE PLACE WHERE YOU ARE LIVING?: NO

## 2024-06-11 SDOH — SOCIAL STABILITY: SOCIAL INSECURITY: ABUSE: ADULT

## 2024-06-11 SDOH — SOCIAL STABILITY: SOCIAL INSECURITY: HAS ANYONE EVER THREATENED TO HURT YOUR FAMILY OR YOUR PETS?: NO

## 2024-06-11 ASSESSMENT — LIFESTYLE VARIABLES
AUDIT-C TOTAL SCORE: 3
HAS A RELATIVE, FRIEND, DOCTOR, OR ANOTHER HEALTH PROFESSIONAL EXPRESSED CONCERN ABOUT YOUR DRINKING OR SUGGESTED YOU CUT DOWN: NO
HAVE YOU OR SOMEONE ELSE BEEN INJURED AS A RESULT OF YOUR DRINKING: NO
AUDIT TOTAL SCORE: 0
PRESCIPTION_ABUSE_PAST_12_MONTHS: NO
AUDIT TOTAL SCORE: 3
SUBSTANCE_ABUSE_PAST_12_MONTHS: YES
HOW OFTEN DURING THE LAST YEAR HAVE YOU HAD A FEELING OF GUILT OR REMORSE AFTER DRINKING: NEVER
SKIP TO QUESTIONS 9-10: 1
HOW OFTEN DURING THE LAST YEAR HAVE YOU FOUND THAT YOU WERE NOT ABLE TO STOP DRINKING ONCE YOU HAD STARTED: NEVER
HOW OFTEN DURING THE LAST YEAR HAVE YOU NEEDED AN ALCOHOLIC DRINK FIRST THING IN THE MORNING TO GET YOURSELF GOING AFTER A NIGHT OF HEAVY DRINKING: NEVER
HOW MANY STANDARD DRINKS CONTAINING ALCOHOL DO YOU HAVE ON A TYPICAL DAY: 1 OR 2
HOW OFTEN DURING THE LAST YEAR HAVE YOU FAILED TO DO WHAT WAS NORMALLY EXPECTED FROM YOU BECAUSE OF DRINKING: NEVER
HOW OFTEN DO YOU HAVE A DRINK CONTAINING ALCOHOL: 2-3 TIMES A WEEK
HOW OFTEN DO YOU HAVE 6 OR MORE DRINKS ON ONE OCCASION: NEVER
AUDIT-C TOTAL SCORE: 3
HOW OFTEN DURING THE LAST YEAR HAVE YOU BEEN UNABLE TO REMEMBER WHAT HAPPENED THE NIGHT BEFORE BECAUSE YOU HAD BEEN DRINKING: NEVER

## 2024-06-11 ASSESSMENT — COGNITIVE AND FUNCTIONAL STATUS - GENERAL
DAILY ACTIVITIY SCORE: 17
MOBILITY SCORE: 13
PERSONAL GROOMING: A LITTLE
WALKING IN HOSPITAL ROOM: A LOT
PATIENT BASELINE BEDBOUND: NO
TOILETING: A LOT
MOVING FROM LYING ON BACK TO SITTING ON SIDE OF FLAT BED WITH BEDRAILS: A LITTLE
TURNING FROM BACK TO SIDE WHILE IN FLAT BAD: A LOT
DRESSING REGULAR LOWER BODY CLOTHING: A LOT
DRESSING REGULAR UPPER BODY CLOTHING: A LITTLE
HELP NEEDED FOR BATHING: A LITTLE
STANDING UP FROM CHAIR USING ARMS: A LOT
CLIMB 3 TO 5 STEPS WITH RAILING: A LOT
MOVING TO AND FROM BED TO CHAIR: A LOT

## 2024-06-11 ASSESSMENT — ACTIVITIES OF DAILY LIVING (ADL)
HEARING - RIGHT EAR: FUNCTIONAL
ADEQUATE_TO_COMPLETE_ADL: YES
JUDGMENT_ADEQUATE_SAFELY_COMPLETE_DAILY_ACTIVITIES: YES
BATHING: NEEDS ASSISTANCE
TOILETING: NEEDS ASSISTANCE
GROOMING: INDEPENDENT
HEARING - LEFT EAR: FUNCTIONAL
FEEDING YOURSELF: INDEPENDENT
DRESSING YOURSELF: NEEDS ASSISTANCE
WALKS IN HOME: NEEDS ASSISTANCE
LACK_OF_TRANSPORTATION: NO
PATIENT'S MEMORY ADEQUATE TO SAFELY COMPLETE DAILY ACTIVITIES?: YES

## 2024-06-11 ASSESSMENT — PAIN - FUNCTIONAL ASSESSMENT
PAIN_FUNCTIONAL_ASSESSMENT: 0-10

## 2024-06-11 ASSESSMENT — ENCOUNTER SYMPTOMS
DEPRESSION: 0
LOSS OF SENSATION IN FEET: 0
OCCASIONAL FEELINGS OF UNSTEADINESS: 1

## 2024-06-11 ASSESSMENT — PAIN SCALES - GENERAL
PAINLEVEL_OUTOF10: 0 - NO PAIN

## 2024-06-11 ASSESSMENT — PATIENT HEALTH QUESTIONNAIRE - PHQ9
2. FEELING DOWN, DEPRESSED OR HOPELESS: NOT AT ALL
2. FEELING DOWN, DEPRESSED OR HOPELESS: NOT AT ALL
SUM OF ALL RESPONSES TO PHQ9 QUESTIONS 1 AND 2: 0
SUM OF ALL RESPONSES TO PHQ9 QUESTIONS 1 & 2: 0
1. LITTLE INTEREST OR PLEASURE IN DOING THINGS: NOT AT ALL
1. LITTLE INTEREST OR PLEASURE IN DOING THINGS: NOT AT ALL

## 2024-06-11 ASSESSMENT — COLUMBIA-SUICIDE SEVERITY RATING SCALE - C-SSRS
2. HAVE YOU ACTUALLY HAD ANY THOUGHTS OF KILLING YOURSELF?: NO
1. IN THE PAST MONTH, HAVE YOU WISHED YOU WERE DEAD OR WISHED YOU COULD GO TO SLEEP AND NOT WAKE UP?: NO
6. HAVE YOU EVER DONE ANYTHING, STARTED TO DO ANYTHING, OR PREPARED TO DO ANYTHING TO END YOUR LIFE?: NO

## 2024-06-11 NOTE — ANESTHESIA PREPROCEDURE EVALUATION
Patient: Zena Baxter    Procedure Information       Date/Time: 06/11/24 1000    Procedures:       Arthrodesis Foot (Left: Foot)      Lengthening Achilles Tendon (Left: Foot)      Arthrodesis Digit Foot (Left: Foot)    Location: [facility] / [facility]    Surgeons: James Can MD            Relevant Problems   No relevant active problems       Clinical information reviewed:   Tobacco  Allergies  Meds   Med Hx  Surg Hx   Fam Hx  Soc Hx        NPO Detail:  NPO/Void Status  Date of Last Liquid: 06/11/24  Time of Last Liquid: 0730  Date of Last Solid: 06/10/24  Time of Last Solid: 2200  Time of Last Void: 0700         Physical Exam    Airway  Mallampati: I  TM distance: >3 FB  Neck ROM: full     Cardiovascular - normal exam  Rhythm: regular  Rate: normal     Dental - normal exam  (+) upper dentures     Pulmonary - normal exam  Breath sounds clear to auscultation     Abdominal - normal exam         Anesthesia Plan    History of general anesthesia?: yes  History of complications of general anesthesia?: no    ASA 1     general     The patient is not a current smoker.    intravenous and inhalational induction   Postoperative administration of opioids is intended.  Anesthetic plan and risks discussed with patient.  Use of blood products discussed with patient who consented to blood products.    Plan discussed with CRNA.

## 2024-06-11 NOTE — ANESTHESIA PROCEDURE NOTES
Airway  Date/Time: 6/11/2024 11:57 AM  Urgency: elective      Staffing  Performed: CRNA   Authorized by: Delfin Torres MD    Performed by: BUFFY Ro-ANDRIA  Patient location during procedure: OR    Indications and Patient Condition  Indications for airway management: anesthesia  Spontaneous Ventilation: absent  Sedation level: deep  Preoxygenated: yes  Patient position: sniffing  Mask difficulty assessment: 0 - not attempted    Final Airway Details  Final airway type: supraglottic airway      Successful airway: Size 4     Number of attempts at approach: 1

## 2024-06-11 NOTE — PROCEDURES
Peripheral Block    Patient location during procedure: pre-op  Start time: 6/11/2024 11:35 AM  End time: 6/11/2024 11:40 AM  Reason for block: at surgeon's request  Staffing  Performed: attending   Authorized by: Delfin Torres MD    Performed by: Delfin Torres MD  Preanesthetic Checklist  Completed: patient identified, IV checked, site marked, risks and benefits discussed, surgical consent, monitors and equipment checked, pre-op evaluation and timeout performed   Timeout performed at: 6/11/2024 11:35 AM  Peripheral Block  Patient position: right lateral decubitus  Prep: ChloraPrep  Patient monitoring: continuous pulse ox  Block type: popliteal  Laterality: left  Injection technique: single-shot  Guidance: Doppler guided and nerve stimulator  Local infiltration: ropivacaine  Infiltration strength: 0.5 %  Dose: 15 mL  Needle  Needle gauge: 20 G  Needle length: 5 cm  Needle localization: ultrasound guidance  Needle insertion depth: 3 cm  Assessment  Injection assessment: no paresthesia on injection and local visualized surrounding nerve on ultrasound  Heart rate change: no          Delfin Torres MD, [facility]  Staff Critical Care Medicine  Department of Anesthesiology and Perioperative Medicine  p. [phone]

## 2024-06-11 NOTE — ANESTHESIA POSTPROCEDURE EVALUATION
Patient: Zena Baxter    Procedure Summary       Date: 06/11/24 Room / Location: [facility] OR 04 / Virtual [facility] OR    Anesthesia Start: 1151 Anesthesia Stop: 1659    Procedures:       Arthrodesis Foot (Left: Foot)      Lengthening Achilles Tendon (Left: Foot)      Arthrodesis Digit Foot (Left: Foot) Diagnosis:     Surgeons: James Can MD Responsible Provider: MINNIE Ro    Anesthesia Type: general ASA Status: 1            Anesthesia Type: general    Vitals Value Taken Time   BP 95/49 06/11/24 1655   Temp 36.4 06/11/24 1659   Pulse 92 06/11/24 1657   Resp 16 06/11/24 1659   SpO2 98 % 06/11/24 1657   Vitals shown include unfiled device data.    Anesthesia Post Evaluation    Patient location during evaluation: PACU  Patient participation: complete - patient participated  Level of consciousness: awake  Pain management: adequate  Airway patency: patent  Cardiovascular status: acceptable  Respiratory status: acceptable and face mask  Hydration status: acceptable  Postoperative Nausea and Vomiting: none      No notable events documented.

## 2024-06-12 VITALS
SYSTOLIC BLOOD PRESSURE: 115 MMHG | DIASTOLIC BLOOD PRESSURE: 71 MMHG | BODY MASS INDEX: 30.61 KG/M2 | TEMPERATURE: 99.3 F | HEIGHT: 67 IN | WEIGHT: 195 LBS | OXYGEN SATURATION: 97 % | HEART RATE: 95 BPM | RESPIRATION RATE: 16 BRPM

## 2024-06-12 LAB
ERYTHROCYTE [DISTWIDTH] IN BLOOD BY AUTOMATED COUNT: 15.1 % (ref 11.5–14.5)
HCT VFR BLD AUTO: 27.3 % (ref 36–46)
HGB BLD-MCNC: 8.7 G/DL (ref 12–16)
HOLD SPECIMEN: NORMAL
MCH RBC QN AUTO: 26.9 PG (ref 26–34)
MCHC RBC AUTO-ENTMCNC: 31.9 G/DL (ref 32–36)
MCV RBC AUTO: 85 FL (ref 80–100)
NRBC BLD-RTO: 0 /100 WBCS (ref 0–0)
PLATELET # BLD AUTO: 169 X10*3/UL (ref 150–450)
RBC # BLD AUTO: 3.23 X10*6/UL (ref 4–5.2)
WBC # BLD AUTO: 6.3 X10*3/UL (ref 4.4–11.3)

## 2024-06-12 PROCEDURE — 97165 OT EVAL LOW COMPLEX 30 MIN: CPT | Mod: GO | Performed by: OCCUPATIONAL THERAPIST

## 2024-06-12 PROCEDURE — 2500000002 HC RX 250 W HCPCS SELF ADMINISTERED DRUGS (ALT 637 FOR MEDICARE OP, ALT 636 FOR OP/ED): Mod: MUE | Performed by: ORTHOPAEDIC SURGERY

## 2024-06-12 PROCEDURE — 2500000001 HC RX 250 WO HCPCS SELF ADMINISTERED DRUGS (ALT 637 FOR MEDICARE OP): Performed by: ORTHOPAEDIC SURGERY

## 2024-06-12 PROCEDURE — 7100000011 HC EXTENDED STAY RECOVERY HOURLY - NURSING UNIT

## 2024-06-12 PROCEDURE — 2500000004 HC RX 250 GENERAL PHARMACY W/ HCPCS (ALT 636 FOR OP/ED): Performed by: PHYSICIAN ASSISTANT

## 2024-06-12 PROCEDURE — 2500000001 HC RX 250 WO HCPCS SELF ADMINISTERED DRUGS (ALT 637 FOR MEDICARE OP): Performed by: PHYSICIAN ASSISTANT

## 2024-06-12 PROCEDURE — 36415 COLL VENOUS BLD VENIPUNCTURE: CPT | Performed by: ORTHOPAEDIC SURGERY

## 2024-06-12 PROCEDURE — 97530 THERAPEUTIC ACTIVITIES: CPT | Mod: GP

## 2024-06-12 PROCEDURE — G0378 HOSPITAL OBSERVATION PER HR: HCPCS

## 2024-06-12 PROCEDURE — 2500000004 HC RX 250 GENERAL PHARMACY W/ HCPCS (ALT 636 FOR OP/ED): Performed by: ORTHOPAEDIC SURGERY

## 2024-06-12 PROCEDURE — 96372 THER/PROPH/DIAG INJ SC/IM: CPT | Mod: 59 | Performed by: ORTHOPAEDIC SURGERY

## 2024-06-12 PROCEDURE — 85027 COMPLETE CBC AUTOMATED: CPT | Performed by: ORTHOPAEDIC SURGERY

## 2024-06-12 PROCEDURE — 97161 PT EVAL LOW COMPLEX 20 MIN: CPT | Mod: GP

## 2024-06-12 PROCEDURE — 99231 SBSQ HOSP IP/OBS SF/LOW 25: CPT | Performed by: PHYSICIAN ASSISTANT

## 2024-06-12 RX ORDER — ACETAMINOPHEN 325 MG/1
650 TABLET ORAL EVERY 6 HOURS PRN
Status: DISCONTINUED | OUTPATIENT
Start: 2024-06-12 | End: 2024-06-13 | Stop reason: HOSPADM

## 2024-06-12 RX ORDER — OXYCODONE AND ACETAMINOPHEN 5; 325 MG/1; MG/1
1.5 TABLET ORAL EVERY 4 HOURS PRN
Status: DISCONTINUED | OUTPATIENT
Start: 2024-06-12 | End: 2024-06-13 | Stop reason: HOSPADM

## 2024-06-12 RX ORDER — OXYCODONE AND ACETAMINOPHEN 5; 325 MG/1; MG/1
1 TABLET ORAL ONCE
Status: COMPLETED | OUTPATIENT
Start: 2024-06-12 | End: 2024-06-12

## 2024-06-12 RX ORDER — ASPIRIN 81 MG/1
81 TABLET ORAL 2 TIMES DAILY
Qty: 60 TABLET | Refills: 0 | Status: SHIPPED | OUTPATIENT
Start: 2024-06-12 | End: 2024-07-12

## 2024-06-12 RX ORDER — SENNOSIDES 8.6 MG/1
2 TABLET ORAL 2 TIMES DAILY
Qty: 40 TABLET | Refills: 0 | Status: SHIPPED | OUTPATIENT
Start: 2024-06-12 | End: 2024-06-22

## 2024-06-12 RX ORDER — OXYCODONE AND ACETAMINOPHEN 5; 325 MG/1; MG/1
1 TABLET ORAL EVERY 4 HOURS PRN
Status: DISCONTINUED | OUTPATIENT
Start: 2024-06-12 | End: 2024-06-13 | Stop reason: HOSPADM

## 2024-06-12 RX ORDER — OXYCODONE AND ACETAMINOPHEN 5; 325 MG/1; MG/1
1 TABLET ORAL EVERY 6 HOURS PRN
Start: 2024-06-12

## 2024-06-12 RX ORDER — SODIUM CHLORIDE 9 MG/ML
85 INJECTION, SOLUTION INTRAVENOUS CONTINUOUS
Status: DISCONTINUED | OUTPATIENT
Start: 2024-06-12 | End: 2024-06-13 | Stop reason: HOSPADM

## 2024-06-12 ASSESSMENT — COGNITIVE AND FUNCTIONAL STATUS - GENERAL
PERSONAL GROOMING: A LITTLE
TOILETING: A LOT
MOVING FROM LYING ON BACK TO SITTING ON SIDE OF FLAT BED WITH BEDRAILS: A LITTLE
DAILY ACTIVITIY SCORE: 14
DRESSING REGULAR UPPER BODY CLOTHING: A LITTLE
TURNING FROM BACK TO SIDE WHILE IN FLAT BAD: A LITTLE
CLIMB 3 TO 5 STEPS WITH RAILING: TOTAL
HELP NEEDED FOR BATHING: A LOT
STANDING UP FROM CHAIR USING ARMS: A LOT
WALKING IN HOSPITAL ROOM: TOTAL
DRESSING REGULAR UPPER BODY CLOTHING: A LITTLE
HELP NEEDED FOR BATHING: A LOT
STANDING UP FROM CHAIR USING ARMS: A LOT
EATING MEALS: A LITTLE
MOVING TO AND FROM BED TO CHAIR: TOTAL
DRESSING REGULAR LOWER BODY CLOTHING: TOTAL
MOVING FROM LYING ON BACK TO SITTING ON SIDE OF FLAT BED WITH BEDRAILS: A LITTLE
MOBILITY SCORE: 11
CLIMB 3 TO 5 STEPS WITH RAILING: TOTAL
PERSONAL GROOMING: A LITTLE
MOBILITY SCORE: 11
MOVING TO AND FROM BED TO CHAIR: TOTAL
TURNING FROM BACK TO SIDE WHILE IN FLAT BAD: A LITTLE
WALKING IN HOSPITAL ROOM: TOTAL
EATING MEALS: A LITTLE
DAILY ACTIVITIY SCORE: 14
TOILETING: A LOT
DRESSING REGULAR LOWER BODY CLOTHING: TOTAL

## 2024-06-12 ASSESSMENT — PAIN - FUNCTIONAL ASSESSMENT
PAIN_FUNCTIONAL_ASSESSMENT: 0-10

## 2024-06-12 ASSESSMENT — PAIN SCALES - GENERAL
PAINLEVEL_OUTOF10: 2
PAINLEVEL_OUTOF10: 4
PAINLEVEL_OUTOF10: 5 - MODERATE PAIN
PAINLEVEL_OUTOF10: 3
PAINLEVEL_OUTOF10: 4
PAINLEVEL_OUTOF10: 9
PAINLEVEL_OUTOF10: 3
PAINLEVEL_OUTOF10: 6
PAINLEVEL_OUTOF10: 5 - MODERATE PAIN
PAINLEVEL_OUTOF10: 4
PAINLEVEL_OUTOF10: 7

## 2024-06-12 ASSESSMENT — PAIN SCALES - PAIN ASSESSMENT IN ADVANCED DEMENTIA (PAINAD): TOTALSCORE: MEDICATION (SEE MAR)

## 2024-06-12 ASSESSMENT — PAIN DESCRIPTION - ORIENTATION
ORIENTATION: POSTERIOR
ORIENTATION: LEFT

## 2024-06-12 ASSESSMENT — PAIN DESCRIPTION - LOCATION
LOCATION: FOOT
LOCATION: NECK
LOCATION: FOOT
LOCATION: FOOT
LOCATION: HAND
LOCATION: FOOT

## 2024-06-12 ASSESSMENT — ACTIVITIES OF DAILY LIVING (ADL)
ADL_ASSISTANCE: INDEPENDENT
ADL_ASSISTANCE: INDEPENDENT

## 2024-06-12 NOTE — BRIEF OP NOTE
Date: 2024  OR Location: [facility] OR    Name: Zena Baxter : 1945, Age: 78 y.o., MRN: [id number], Sex: female    Diagnosis  Pre-op Diagnosis     * Equinus contracture of left ankle [M24.572]     * Pes planus of left foot [M21.42]     * Arthritis of foot [M19.079] Post-op Diagnosis     * Pes planus of left foot [M21.42]     * Equinus contracture of left ankle [M24.572]     * Arthritis of foot [M19.079]     Procedures  Arthrodesis Foot  31874 - CO ARTHRODESIS TRIPLE    Lengthening Achilles Tendon  34813 - CO LNGTH/SHRT TENDON LEG/ANKLE 1 TENDON SPX      Surgeons      * James Can - Primary    Resident/Fellow/Other Assistant:  Surgeons and Role:  * No surgeons found with a matching role *    Procedure Summary  Anesthesia: General  ASA: I  Anesthesia Staff: Anesthesiologist: Delfin Torres MD; Oscar Cummings DO; Miguel Goodman MD  CRNA: BUFFY Ro-CRNA; BUFFY Corley-CRNA  Estimated Blood Loss: 150 mL  Intra-op Medications:   Administrations occurring from 1000 to 1330 on 24:   Medication Name Total Dose   ceFAZolin in dextrose (iso-os) (Ancef) IVPB 2 g 2 g              Anesthesia Record               Intraprocedure I/O Totals          Intake    Propofol Drip 99.90 mL    The total shown is the total volume documented since Anesthesia Start was filed.    Phenylephrine Drip 125.23 mL    The total shown is the total volume documented since Anesthesia Start was filed.    LR infusion 1600.00 mL    ceFAZolin in dextrose (iso-os) (Ancef) IVPB 2 g 200.00 mL    Total Intake 2025.13 mL       Output    Est. Blood Loss 150 mL    Total Output 150 mL       Net    Net Volume 1875.13 mL          Specimen: No specimens collected     Staff:   Scrub Person: Leela  Circulator: Jos  Circulator: Calli Domingo Circulator: Sarai  Relief Scrub: Ivana  Relief Scrub: Melanie  Scrub Person: Nicole          Findings: Subfibular impingement, end-stage degenerative changes of the hindfoot with  complete subtalar dislocation/subluxation    Complications:  None; patient tolerated the procedure well.     Disposition: PACU - hemodynamically stable.  Condition: stable  Specimens Collected: No specimens collected  Attending Attestation: I was present and scrubbed for the entire procedure.    James Can  Phone Number: [phone]

## 2024-06-12 NOTE — PROGRESS NOTES
Physical Therapy    Physical Therapy Evaluation and Treatment    Patient Name: Zena Baxter  MRN: [id number]  Today's Date: 6/12/2024   Time Calculation  Start Time: 1050 (split session 2058-0618)  Stop Time: 1240 (slit session 6746-5183)  Time Calculation (min): 63 min  4105/4105-A    Assessment/Plan   PT Assessment  PT Assessment Results: Decreased strength, Decreased range of motion, Decreased endurance, Impaired balance, Decreased mobility, Pain, Orthopedic restrictions  Rehab Prognosis: Good  Evaluation/Treatment Tolerance: Patient limited by pain, Patient limited by fatigue  Medical Staff Made Aware: Yes  End of Session Communication: Bedside nurse  Assessment Comment: Pt presents today below baseline level of function and requires continued PT during hospital stay. Pt requires 24 hour physical assist for all mobility to prevent falls. Pt is unsafe to navigate home environment at this time with available support and assist. Pt requires PT at a moderate intensity level at discharge to maximize functional mobility and safety.    End of Session Patient Position: Up in chair, Alarm on  IP OR SWING BED PT PLAN  Inpatient or Swing Bed: Inpatient  PT Plan  Treatment/Interventions: Bed mobility, Transfer training, Gait training, Balance training, Neuromuscular re-education, Strengthening, Endurance training, Range of motion, Therapeutic exercise, Therapeutic activity, Home exercise program  PT Plan: Ongoing PT  PT Frequency: Daily  PT Discharge Recommendations: Moderate intensity level of continued care  Equipment Recommended upon Discharge: Wheeled walker, Wheelchair  PT Recommended Transfer Status: Assist x2 (2-3 assist for SPT)  PT - OK to Discharge: Yes (To next level of care when cleared by medical team   )    Subjective       General Visit Information:  General  Reason for Referral: recent surgery  Referred By: James Can MD  Past Medical History Relevant to Rehab: Pt admitted 6/11/24 following completion  o fL foot perc JORGE, triple arthrodesis. PMH: anemia, anxiety, depression, non Hodgkin's lymphoma, macular degeneration, TKA  Family/Caregiver Present: No  Co-Treatment: OT  Co-Treatment Reason: for safety  Prior to Session Communication: Bedside nurse  Patient Position Received: Bed, 3 rail up, Alarm on  Preferred Learning Style: verbal  General Comment: Pt agreeable to PT, nursing cleared for treatment. Split session performed [id number] and 2229-1633 due to patient requiring skilled PT to complete transfer back to bed after sitting up in chair for 47 minutes.    Home Living:  Home Living  Type of Home: House  Lives With: Alone  Home Adaptive Equipment: Walker rolling or standard (rollator, pt may be able to borrow wheelchair)  Home Layout: One level  Home Access: Stairs to enter without rails  Entrance Stairs-Number of Steps: 2 platform style steps  Bathroom Shower/Tub: Walk-in shower  Bathroom Equipment: Grab bars in shower, Shower chair with back, Raised toilet seat with rails    Prior Level of Function:  Prior Function Per Pt/Caregiver Report  ADL Assistance: Independent  Homemaking Assistance: Independent  Ambulatory Assistance: Independent  Prior Function Comments: grandaughters and neighbors can assist at discharge    Precautions:  Precautions  LE Weight Bearing Status:  (NWB left LE)  Medical Precautions: Fall precautions    Vital Signs:     Objective     Pain:  Pain Assessment  Pain Assessment: 0-10  Pain Score: 5 - Moderate pain  Pain Type: Surgical pain  Pain Location: Foot  Pain Orientation: Left  Pain Interventions: Cold applied, Repositioned, Ambulation/increased activity, Elevated    Cognition:  Cognition  Overall Cognitive Status: Within Functional Limits  Orientation Level: Oriented X4    General Assessments:      Activity Tolerance  Endurance: Tolerates 10 - 20 min exercise with multiple rests  Sensation  Sensation Comment: denies numbness and tingling              Static Sitting Balance  Static  Sitting-Comment/Number of Minutes: good  Dynamic Sitting Balance  Dynamic Sitting-Comments: good  Static Standing Balance  Static Standing-Comment/Number of Minutes: fair  Dynamic Standing Balance  Dynamic Standing-Comments: poor    Functional Assessments:     Bed Mobility  Bed Mobility: Yes  Bed Mobility 1  Bed Mobility 1: Supine to sitting  Level of Assistance 1: Contact guard  Bed Mobility 2  Bed Mobility  2: Sitting to supine  Level of Assistance 2: Minimum assistance  Bed Mobility Comments 2: assist at left LE  Transfers  Transfer: Yes  Transfer 1  Transfer From 1: Sit to  Transfer to 1: Stand  Transfer Device 1: Walker  Transfer Level of Assistance 1: Moderate assistance (3 person assist, third person needed to keep left LE off ground)  Trials/Comments 1: with second session, right knee buckling noted with attempt to shuffle right foot to advance right LE  Transfers 2  Transfer From 2: Stand to  Transfer to 2: Sit  Transfer Device 2: Walker  Transfer Level of Assistance 2: Moderate assistance  Trials/Comments 2: 3 person assist, third person needed to keep left LE off ground  Transfers 3  Transfer From 3: Bed to  Transfer to 3: Chair with arms  Technique 3: Stand pivot  Transfer Device 3: Walker, Gait belt  Transfer Level of Assistance 3: Maximum assistance, +2  Trials/Comments 3: pt with increased difficulty shuffling right foot to pivot to chair  Transfers 4  Transfer From 4: Chair with arms to  Transfer to 4: Bed  Technique 4: Stand pivot  Transfer Device 4: Gait belt  Transfer Level of Assistance 4: Maximum assistance (x 3 assist. Third person needed to keep left foot off ground. Used arm in arm assist with use of bedrail for support)  Trials/Comments 4: right knee blocked due to buckling  Ambulation/Gait Training  Ambulation/Gait Training Performed: No (unable to advance right LE to take forward steps)        Treatment    Pt educated on NWB precautions. Cues for safe hand placement and left LE extension  with use of FWW for sit<>stand. Cues for safe hand placement and technique for completion of bed to chair transfers.     Extremity/Trunk Assessments:        RLE   RLE : Exceptions to WFL  AROM RLE (degrees)  RLE AROM Comment: WFL  Strength RLE  RLE Overall Strength: Greater than or equal to 3/5 as evidenced by functional mobility  R Knee Extension: 2+/5  LLE   LLE : Exceptions to WFL  AROM LLE (degrees)  LLE AROM Comment: unable to assess ankle due to splint, remaining WFL with functional mobility  Strength LLE  LLE Overall Strength: Greater than or equal to 3/5 as evidenced by functional mobility (unable to assess ankle due to recent surgery)    Outcome Measures:     [facility] Basic Mobility  Turning from your back to your side while in a flat bed without using bedrails: A little  Moving from lying on your back to sitting on the side of a flat bed without using bedrails: A little  Moving to and from bed to chair (including a wheelchair): Total  Standing up from a chair using your arms (e.g. wheelchair or bedside chair): A lot  To walk in hospital room: Total  Climbing 3-5 steps with railing: Total  Basic Mobility - Total Score: 11                                        Goals:  Encounter Problems       Encounter Problems (Active)       PT Problem       Pt will perform bed mobility with mod I.        Start:  06/12/24    Expected End:  06/26/24            Pt will complete sit <> stand and bed <> chair transfers with min A.         Start:  06/12/24    Expected End:  06/26/24            Pt will ambulate 5 feet min A using FWW with no significant gait deviations.         Start:  06/12/24    Expected End:  06/26/24            Pt will progress to completing 3 x 20 supine/seated exercises in order to increase strength and improve gait mechanics.         Start:  06/12/24    Expected End:  06/26/24                 Education Documentation  Precautions, taught by Leela Golden, PT at 6/12/2024  1:47 PM.  Learner:  Patient  Readiness: Acceptance  Method: Explanation  Response: Verbalizes Understanding, Needs Reinforcement    Body Mechanics, taught by Leela Golden, PT at 6/12/2024  1:47 PM.  Learner: Patient  Readiness: Acceptance  Method: Explanation  Response: Verbalizes Understanding, Needs Reinforcement    Mobility Training, taught by Leela Golden, PT at 6/12/2024  1:47 PM.  Learner: Patient  Readiness: Acceptance  Method: Explanation  Response: Verbalizes Understanding, Needs Reinforcement    Education Comments  No comments found.

## 2024-06-12 NOTE — PROGRESS NOTES
06/12/24 1210   Discharge Planning   Living Arrangements Alone   Support Systems Family members   Type of Residence Private residence   Home or Post Acute Services Post acute facilities (Rehab/SNF/etc)   Type of Post Acute Facility Services Rehab   Patient expects to be discharged to: [facility] vs SNF   Does the patient need discharge transport arranged? Yes   RoundTrip coordination needed? Yes     Met with patient at bedside. Admitted for Pes planus left foot. Pt lives alone and was independent PTA with no HHC. Pt has a walker. PCP is Leland Blake. Pt is able to manage her health and understands her medications. Was able to drive and obtain meds. Therapy is verbally recommending SNF. Pt would like referral sent to [facility]. Referral sent in [facility]. SNF list also provided to patient.     [facility] is able to accept patient. No precert needed.

## 2024-06-12 NOTE — PROGRESS NOTES
Occupational Therapy    Evaluation    Patient Name: Zena Baxter  MRN: [id number]  Today's Date: 6/12/2024  Time Calculation  Start Time: 1217  Stop Time: 1239  Time Calculation (min): 22 min  4105/4105-A  Eval only     Assessment  IP OT Assessment  Prognosis: Good  Medical Staff Made Aware: Yes  End of Session Communication: Bedside nurse  End of Session Patient Position: Up in chair, Alarm on  Patient presents with decline in ADLs, functional transfers, and functional mobility tasks and would benefit from OT during acute stay to maximize functional independence and safety.  Patient requires 24 hours hands on assistance.  Recommend moderate intensity OT to maximize functional independence and safety.     Plan:  Treatment Interventions: ADL retraining, Functional transfer training, Patient/family training, Equipment evaluation/education, Compensatory technique education  OT Frequency: Daily  OT Discharge Recommendations: Moderate intensity level of continued care  OT - OK to Discharge: Yes  from acute care OT services to the next level of care when cleared by medical team      Subjective   Current Problem:  No diagnosis found.    General:  General  Reason for Referral: recent surgery  Referred By: James Can MD  Past Medical History Relevant to Rehab: Admitted 6/11/2024 after having the following completed by Dr Can: Arthrodesis Foot  Lengthening Achilles Tendon  Arthrodesis Digit Foot    PMH: anemia, anxiety, depression, GERD, HLD, HTN, non-hodgkin lymphoma, macular degeneration  Co-Treatment: PT  Co-Treatment Reason: for safety  Prior to Session Communication: Bedside nurse  Patient Position Received: Up in chair, Alarm on  Preferred Learning Style: verbal  General Comment: Patient seated in bedside chair and agreeable to participate in OT evaluation.    Precautions:  LE Weight Bearing Status: Left Non-Weight Bearing    Pain:  Pain Assessment  Pain Assessment: 0-10  Pain Score: 5 - Moderate pain  Pain  Type: Surgical pain  Pain Location: Foot  Pain Orientation: Left  Pain Interventions: Medication (See MAR)    Objective   Cognition:  Overall Cognitive Status: Within Functional Limits    Home Living:  Type of Home: House  Lives With: Alone  Home Adaptive Equipment: Walker rolling or standard  Home Layout: One level  Home Access: Stairs to enter without rails  Entrance Stairs-Number of Steps: 2  Bathroom Shower/Tub: Walk-in shower  Bathroom Equipment: Grab bars in shower, Shower chair with back, Raised toilet seat with rails     Prior Function:  ADL Assistance: Independent  Homemaking Assistance: Independent  Ambulatory Assistance: Independent  Prior Function Comments: grandaughters and neighbors can assist at discharge    ADL:  LE Dressing Assistance: Total (don/doff sock and shoe right foot)    Activity Tolerance:  Endurance: Decreased tolerance for upright activites    Bed Mobility/Transfers:   Bed Mobility  Bed Mobility:  (min assist sit to supine in bed)  Transfers  Transfer:  (mod assist of 2 with an additional person assist to maintain left LE NWB sit <> stand from chair.  Patient unable to utilize walker to transfer from bedside chair to edge of bed.  Max assist of 2 stand pivot, with an additional person assist to support left LE to maintain NWB.  )    Extremities: RUE   RUE : Within Functional Limits and LUE   LUE: Within Functional Limits    Outcome Measures: [facility] Daily Activity  Putting on and taking off regular lower body clothing: Total  Bathing (including washing, rinsing, drying): A lot  Putting on and taking off regular upper body clothing: A little  Toileting, which includes using toilet, bedpan or urinal: A lot  Taking care of personal grooming such as brushing teeth: A little  Eating Meals: A little  Daily Activity - Total Score: 14    EDUCATION:  Education  Individual(s) Educated: Patient  Education Provided: Fall precautons (left LE WB status)  Patient Response to Education: Patient/Caregiver  Verbalized Understanding of Information      Goals:   Encounter Problems       Encounter Problems (Active)       Dressings Lower Extremities       STG - Patient will complete lower body dressing will be max assist with comp strategies and AE maintaining left LE WB status (Progressing)       Start:  06/12/24    Expected End:  06/26/24               Functional Balance       STG-Patient will be min assist with assistive device dynamic stand task >2 minutes for ADL participation maintaining left LE WB status (Progressing)       Start:  06/12/24    Expected End:  06/26/24               Grooming       STG - Patient completes grooming with min assist maintaining left LE WB status (Progressing)       Start:  06/12/24    Expected End:  06/26/24               OT Transfers       STG-Patient will be min assist with functional transfers demonstrating good safety maintaining left LE WB status (Progressing)       Start:  06/12/24    Expected End:  06/26/24               Safety       STG-Patient will independently verbalize and demonstrate left LE WB status with all functional tasks  (Progressing)       Start:  06/12/24    Expected End:  06/26/24

## 2024-06-12 NOTE — CARE PLAN
The patient's goals for the shift include      The clinical goals for the shift include pain control      Problem: Pain  Goal: My pain/discomfort is manageable  Outcome: Progressing     Problem: Safety  Goal: Patient will be injury free during hospitalization  Outcome: Progressing     Problem: Daily Care  Goal: Daily care needs are met  Outcome: Progressing     Problem: Psychosocial Needs  Goal: Demonstrates ability to cope with hospitalization/illness  Outcome: Progressing     Problem: Discharge Barriers  Goal: My discharge needs are met  Outcome: Progressing

## 2024-06-12 NOTE — PROGRESS NOTES
"Zena Baxter is a 78 y.o. female on day 0 of admission presenting with Pes planus of left foot s/p L foot perc JORGE, triple arthrodesis.      Subjective   Patient doing well this AM.  Pain still well controlled with block.  Reports some sensation in toes.  Tolerating PO.  Denies any other issues.  Sleeping comfortably upon entering room.       Objective     Physical Exam  LLE MSK  Splint C/D/I  Wiggles toes  Toes WWP  Some sensation noted on dorsal and plantar aspect of toes - block largely still in effect    Last Recorded Vitals  Blood pressure 93/53, pulse 82, temperature 36.2 °C (97.2 °F), temperature source Temporal, resp. rate 18, height 1.702 m (5' 7\"), weight 88.5 kg (195 lb), SpO2 95%.  Intake/Output last 3 Shifts:  I/O last 3 completed shifts:  In: 2525.1 (28.5 mL/kg) [I.V.:1825.1 (20.6 mL/kg); IV Piggyback:700]  Out: 1200 (13.6 mL/kg) [Urine:1050 (0.3 mL/kg/hr); Blood:150]  Weight: 88.5 kg     Relevant Results      Scheduled medications  ceFAZolin, 2 g, intravenous, q8h  enoxaparin, 30 mg, subcutaneous, q12h CHIRAG  fluticasone, 2 spray, Each Nostril, Daily  hydroCHLOROthiazide, 12.5 mg, oral, q AM  lisinopril 10 mg, hydroCHLOROthiazide 12.5 mg for Zestoretic/Prinizide, , oral, Daily  pantoprazole, 40 mg, oral, Daily before breakfast  polyethylene glycol, 17 g, oral, Daily  sennosides, 2 tablet, oral, BID  sertraline, 100 mg, oral, Daily  simvastatin, 20 mg, oral, Nightly      Continuous medications  oxygen, 2 L/min      PRN medications  PRN medications: acetaminophen, HYDROmorphone, ketorolac, naloxone, naloxone, naloxone, ondansetron **OR** ondansetron, oxyCODONE-acetaminophen  Results for orders placed or performed during the hospital encounter of 06/11/24 (from the past 24 hour(s))   CBC   Result Value Ref Range    WBC 6.3 4.4 - 11.3 x10*3/uL    nRBC 0.0 0.0 - 0.0 /100 WBCs    RBC 3.23 (L) 4.00 - 5.20 x10*6/uL    Hemoglobin 8.7 (L) 12.0 - 16.0 g/dL    Hematocrit 27.3 (L) 36.0 - 46.0 %    MCV 85 80 - 100 " fL    MCH 26.9 26.0 - 34.0 pg    MCHC 31.9 (L) 32.0 - 36.0 g/dL    RDW 15.1 (H) 11.5 - 14.5 %    Platelets 169 150 - 450 x10*3/uL                            Assessment/Plan   Principal Problem:    Pes planus of left foot  POD 1 s/p L foot perc JORGE, triple arthrodesis  -Regular diet  -Ancef post-op x 2 doses   -PT/OT eval and treat  -Pain control  -NWB LLE  -DVT PPX: Lovenox but will transition to ASA 81 mg BID at discharge  -Anticipate likely discharge to home later today           I spent 30 minutes in the professional and overall care of this patient.      James Can MD

## 2024-06-12 NOTE — CARE PLAN
The patient's goals for the shift include      The clinical goals for the shift include pain control    Patient admitted     Problem: Pain  Goal: My pain/discomfort is manageable  Outcome: Progressing     Problem: Safety  Goal: Patient will be injury free during hospitalization  Outcome: Progressing  Goal: I will remain free of falls  Outcome: Progressing     Problem: Daily Care  Goal: Daily care needs are met  Outcome: Progressing     Problem: Psychosocial Needs  Goal: Demonstrates ability to cope with hospitalization/illness  Outcome: Progressing  Goal: Collaborate with me, my family, and caregiver to identify my specific goals  Outcome: Progressing  Flowsheets (Taken 6/11/2024 1957)  Cultural Requests During Hospitalization: none  Spiritual Requests During Hospitalization: none     Problem: Discharge Barriers  Goal: My discharge needs are met  Outcome: Progressing

## 2024-06-13 NOTE — NURSING NOTE
[facility] transport service arrived for patient, VSS, transferred onto cart safely. Update called to [facility] re: 2300 pain med and 2100 lovenox.

## 2024-06-13 NOTE — CARE PLAN
The patient's goals for the shift include      The clinical goals for the shift include pain control    Over the shift, the patient did make progress toward the following goal of pain control.

## 2024-06-13 NOTE — OP NOTE
Arthrodesis Foot (L), Lengthening Achilles Tendon (L), Arthrodesis Digit Foot (L) Operative Note     Date: 2024  OR Location: [facility]    Name: Zena Baxter YOB: 1945, Age: 78 y.o., MRN: [id number], Sex: female    Diagnosis  Pre-op Diagnosis     * Equinus contracture of left ankle [M24.572]     * Pes planus of left foot [M21.42]     * Arthritis of foot [M19.079] Post-op Diagnosis     * Pes planus of left foot [M21.42]     * Equinus contracture of left ankle [M24.572]     * Arthritis of foot [M19.079]     Procedures  Arthrodesis Foot  16090 - [place] ARTHRODESIS TRIPLE    Lengthening Achilles Tendon  16268 - [place] LNGTH/SHRT TENDON LEG/ANKLE 1 TENDON SP      Surgeons      * James Can - Primary    Resident/Fellow/Other Assistant:  Surgeons and Role:  * No surgeons found with a matching role *    Procedure Summary  Anesthesia: General  ASA: I  Anesthesia Staff: Anesthesiologist: Delfin Torres MD; Oscar Cummings DO; Miguel Goodman MD  CRNA: BUFFY Ro-CRNA; BUFFY Corley-CRNA  Estimated Blood Loss: 150 mL  Intra-op Medications:   Administrations occurring from 1000 to 1330 on 24:   Medication Name Total Dose   ceFAZolin in dextrose (iso-os) (Ancef) IVPB 2 g 2 g              Anesthesia Record               Intraprocedure I/O Totals          Intake    Propofol Drip 99.90 mL    The total shown is the total volume documented since Anesthesia Start was filed.    Phenylephrine Drip 125.23 mL    The total shown is the total volume documented since Anesthesia Start was filed.    LR infusion 1600.00 mL    ceFAZolin in dextrose (iso-os) (Ancef) IVPB 2 g 200.00 mL    Total Intake 2025.13 mL       Output    Est. Blood Loss 150 mL    Total Output 150 mL       Net    Net Volume 1875.13 mL          Specimen: No specimens collected     Staff:   Scrub Person: Leela  Circulator: Jos  Circulator: Calli Domingo Circulator: Sarai Domingo Scrub: Ivana Domingo Scrub: Melanie Vargheseub Person:  Nicole         Drains and/or Catheters:   External Urinary Catheter Female (Active)   Output (mL) 350 mL 06/12/24 0400       Tourniquet Times:     Total Tourniquet Time Documented:  Thigh (Left) - 120 minutes  Total: Thigh (Left) - 120 minutes      Implants:  Implants       Type Name Action Serial No.      Putty ARTHROCELL PLUS ALLOGRAFT Implanted [id number]     Screw low profile screw 6.7 x 85 Implanted NA     Screw low profile screw 6.7 x 75mm, 18mm threaded Implanted NA     Screw low profile screw 4.5 x 50mm cannulated Implanted NA     Screw low profile screw 4.5 x 55 Implanted NA     Screw low profile screw 6.7 x 55mm Implanted NA              Findings: Subfibular impingement, end-stage degenerative changes of the hindfoot with complete peritalar dislocation/subluxation     Indications: Zena Baxter is an 78 y.o. female who is having surgery for severe rigid left foot pes planus with subfibular impingement and near complete subtalar subluxation/dislocation. Patient has a long history of prior conservative treatment with custom bracing including an [place] brace which she has worn for approximately 10 years.  She has had worsening deformity and pain which has now failed to respond to conservative treatment.  She had presented to my office with a desire for more definitive surgical treatment.  We had obtained a preoperative CT scan and I recommended a triple arthrodesis with Achilles lengthening to correct her severe deformity and assist with future mobilization.  We discussed the risks, benefits and alternatives to surgery at length.  We discussed the risk of malunion/nonunion, wound healing complications, bleeding, infection, need for additional procedures, damage to surrounding nerves or blood vessels, recurrent deformity, ongoing pain, stiffness, development of adjacent arthritis, the risk of anesthetic and the risk of blood clots.  She expressed understanding of these risk and wished to proceed forward  with surgical intervention.    The patient was seen in the preoperative area. The risks, benefits, complications, treatment options, non-operative alternatives, expected recovery and outcomes were discussed with the patient. The possibilities of reaction to medication, pulmonary aspiration, injury to surrounding structures, bleeding, recurrent infection, the need for additional procedures, failure to diagnose a condition, and creating a complication requiring transfusion or operation were discussed with the patient. The patient concurred with the proposed plan, giving informed consent.  The site of surgery was properly marked. The patient has been actively warmed in preoperative area. Preoperative antibiotics have been ordered and given within 1 hours of incision. Venous thrombosis prophylaxis have been ordered including unilateral sequential compression device    Procedure Details: After giving informed consent and being marked in the preoperative holding area the patient received a popliteal block performed by the anesthesia team. The patient was then taken to the operating room.  She was placed in the supine position on the operative table.  All bony prominences were well-padded.  She received a preoperative dose of antibiotics.  General anesthesia was induced and an LMA was placed.  A bump was placed under the ipsilateral hip.  A nonsterile pneumatic tourniquet was placed on the left lower extremity.  This was inflated at the beginning of the case and let down prior to wound closure.  The patient was then  prepped and draped in the usual sterile fashion such that the left lower extremity was exposed.  A timeout was performed identifying the correct patient, procedure, laterality and location of surgery.  We confirmed that all implants were available in the room.  We also confirmed that preoperative antibiotics had been given.  The surgical team was in agreement.    At the beginning of the procedure the left  lower extremity was exsanguinated with gravity and the tourniquet was inflated to 250 mmHg.  I began the procedure with a percutaneous JORGE.  This was performed by percutaneously making 3 hemisections of the Achilles tendon spaced approximately 2-1/2 cm apart.  The foot was then dorsiflexed causing the tendon to lengthen.  These incisions were hemostatic at the end of the case and did not require any closure.    I then created a sinus tarsi incision in order to expose the subtalar and calcaneocuboid joints.  The peroneal tendons were protected.  The EDB was retracted dorsally.  The subtalar and calcaneocuboid joints were subsequently exposed.  Giving the severe deformity we were correcting, mobilizing the subtalar joint required significant effort to free and mobilized the adjacent soft tissues and joint.  This was performed using a sharp osteotome as well as a Stover elevator.  There was difficulty in mobilizing the talus so we turned our attention to the dissection of the talonavicular joint.  The incision was deepened through skin and soft tissue.  The EHL was carried medially and the deep neurovascular bundle was carried laterally.  The joint was then mobilized utilizing a sharp osteotome and a stover elevator to mobilize the soft tissues around the talar neck.  A pin distractor was used to expose the joint.  The joint was then prepared for arthrodesis removing all remaining cartilage with sharp osteotomes and curettes and then the subchondral bone was drilled utilizing a 3.5 mm drill bit.  I then turned my attention back to the subtalar joint.  There was still some difficulty in mobilizing the subtalar joint and in appropriately reducing the hindfoot.  There was a dysmorphic anterior process of the calcaneus which appeared to be blocking the reduction. I debrided this utilizing a large rongeur as well as an osteotome and mallet.  This was saved and later incorporated with our Arthrocell bone graft after removal of  soft tissues and morselization.  Removal of this bone allowed for mobilization of the subtalar joint.  The subtalar joint was then prepped utilizing a combination of sharp osteotomes and curettes and the subchondral bone was again drilled with a 3.5 mm drill bit.  I then turned my attention to the calcaneocuboid joint.  This was again prepped in a similar fashion.  We did not need to use a pin distractor for preparation of this joint.  I again utilized a curette and sharp osteotomes to prepare the joint and the joint was then again drilled using a 3.5 mm drill bit in preparation for arthrodesis.    At this point I was able to reduce the hindfoot.  The joints were irrigated with sterile normal saline.  I then placed Arthrocell graft in the subtalar joint.  I then aligned the subtalar joint for arthrodesis correcting the hindfoot valgus and external rotation.  This was performed by placing a guidepin from the anteromedial talar neck into the plantar lateral calcaneus.  We confirmed the position of the guidewire fluoroscopically and were pleased.  We then measured for drilled and placed a 6.7 mm partially-threaded screw to initially stabilize the subtalar joint.  A second 6.7 millimeter partially-threaded screw was placed from the plantar lateral aspect of the calcaneal body into the central talar dome after the position of the guidepin was confirmed fluoroscopically and judged to be acceptable.  Once we were pleased with our fixation of the subtalar joint, we then turned our attention to the talonavicular joint.  I then corrected talonavicular joint alignment in order to correct forefoot varus and abduction.  Prior to stabilization this was again grafted with Arthrocell graft. This was stabilized with 3 guidewires for cannulated 4.5 millimeter screws.  The position of the guide pins was confirmed and judged to be acceptable on AP foot and lateral foot fluoroscopic images.  The most medial guidewires was placed  percutaneously while the other 2 guidewires had been placed through the dorsal incsion.  At this point we measured for, drilled and placed 3 screws over these guidewires.  This provided excellent stabilization of the talonavicular joint.  Finally we turned our attention back to the calcaneocuboid joint.  This is again grafted with Arthrocell graft.  The cuboid was elevated and the calcaneocuboid joint was subsequently pinned utilizing a guidewire for the 6.7 mm partially-threaded cannulated screw.  Position of the guidewire was confirmed fluoroscopically and judged to be acceptable.  At this point we drilled for and placed a 6.7 mm partially-threaded cannulated screw over the guidewire across the calcaneocuboid joint achieving excellent compression.  Final AP foot and ankle fluoroscopy images as well as lateral foot and axial heel fluoroscopy images were obtained confirming acceptable position of all hardware and compression of the joints as well as correction of the deformity.  Clinical alignment of the deformity was judged to be acceptable with correction of hindfoot valgus to approximately 5°, neutral forefoot position and correction of abduction.  Incisions were irrigated with sterile normal saline.  Tourniquet had previously been let down and hemostasis had been achieved.  There was some ongoing venous ooze through the dorsal incision so Floseal was used to augment hemostasis.  Deep soft tissues of the lateral incision were closed with 0 and 2-0 Vicryl.  Deep dermis of the incisions was closed with 3-0 Monocryl and skin was closed with 3-0 nylon in a horizontal mattress fashion.  Sterile dressings were applied and the patient was placed in a well-padded posterior splint.  She was subsequently extubated and taken to PACU in stable condition having tolerated the procedure well.  All counts were correct at the completion of the procedure.    Postoperatively the patient will be admitted to the hospital to work with  PT/OT.  She will be nonweightbearing for at least 6 weeks and will begin some progressive weightbearing after that time most likely.  At discharge she will be on ASA 81 mg twice daily for DVT prophylaxis.  She will follow-up in my clinic in 2 weeks for wound check and suture removal at which point she will likely be transitioned into a cast.    Complications:  None; patient tolerated the procedure well.    Disposition: PACU - hemodynamically stable.  Condition: stable         Additional Details: None    Attending Attestation: I was present and scrubbed for the entire procedure.    James Can  Phone Number: [phone]

## 2024-06-21 ENCOUNTER — DOCUMENTATION (OUTPATIENT)
Dept: HOME HEALTH SERVICES | Facility: HOME HEALTH | Age: 79
End: 2024-06-21
Payer: MEDICARE

## 2024-06-21 ENCOUNTER — HOME HEALTH ADMISSION (OUTPATIENT)
Dept: HOME HEALTH SERVICES | Facility: HOME HEALTH | Age: 79
End: 2024-06-21
Payer: MEDICARE

## 2024-06-21 NOTE — HH CARE COORDINATION
Home Care received a Referral for Nursing, Physical Therapy, and Occupational Therapy. We have processed the referral for a Start of Care on 6.23.24 TO 6.24.24.     If you have any questions or concerns, please feel free to contact us at [phone]. Follow the prompts, enter your five digit zip code, and you will be directed to your care team on [facility] 2.

## 2024-06-25 ENCOUNTER — HOME CARE VISIT (OUTPATIENT)
Dept: HOME HEALTH SERVICES | Facility: HOME HEALTH | Age: 79
End: 2024-06-25
Payer: MEDICARE

## 2024-06-25 VITALS
OXYGEN SATURATION: 98 % | SYSTOLIC BLOOD PRESSURE: 122 MMHG | RESPIRATION RATE: 20 BRPM | TEMPERATURE: 97.1 F | HEART RATE: 72 BPM | DIASTOLIC BLOOD PRESSURE: 78 MMHG

## 2024-06-25 PROCEDURE — 169592 NO-PAY CLAIM PROCEDURE

## 2024-06-25 PROCEDURE — G0299 HHS/HOSPICE OF RN EA 15 MIN: HCPCS | Mod: HHH

## 2024-06-25 ASSESSMENT — ACTIVITIES OF DAILY LIVING (ADL): ENTERING_EXITING_HOME: MODERATE ASSIST

## 2024-06-25 NOTE — HOME HEALTH
"SKILLED NURSING VISIT FOR ADMISSION TO [facility]OMECARE SERVICES ASSESSMENT TEACHING OF MEDICATIONS DISEASE PROCESS ADMINISTRATION OF LOVENOX INJECTION. PT TOLERATED PROCEDURES WELL. PT HAD CAST APPLIED TO LLE THIS AM. CAST DRY AND INTACT. TOES LEFT FOOT MOBILE PINK BUT COOL TO TOUCH. PT REPORTS THIS IS NORMAL FOR HER. PT REPORTS CAST TO BE WORN X 4 WEEKS. PT REPORTS INCISION \"LOOKED GOOD\" AT APPT. PT ABLE TO TRANSFER SELF WITH WALKER BUT REPORTS PREFERS TO USE WHEELCHAIR AT THIS TIME. PT REPORTS NO ASA OR BP MEDICATIONS DUE TO NAUSEA AND LOW BP. SN INSTRUCTED PT AND GRAND DAUGHTER ERIK IN ADMINSITRATION OF LOVENOX INJECTIONS DAILY. PT ACCEPTS SN PT AND OT SERVICES. PTS FAMILY WILL BE STAYING WITH HER AT THIS TIME."

## 2024-06-26 ENCOUNTER — HOME CARE VISIT (OUTPATIENT)
Dept: HOME HEALTH SERVICES | Facility: HOME HEALTH | Age: 79
End: 2024-06-26
Payer: MEDICARE

## 2024-06-26 PROCEDURE — G0151 HHCP-SERV OF PT,EA 15 MIN: HCPCS | Mod: HHH

## 2024-06-26 PROCEDURE — G0152 HHCP-SERV OF OT,EA 15 MIN: HCPCS | Mod: HHH | Performed by: OCCUPATIONAL THERAPIST

## 2024-06-26 ASSESSMENT — ACTIVITIES OF DAILY LIVING (ADL)
TOILETING: SUPERVISION
TOILETING: 1
CURRENT_FUNCTION: STAND BY ASSIST
OASIS_M1830: 06
BATHING_CURRENT_FUNCTION: INDEPENDENT
DRESSING_LB_CURRENT_FUNCTION: SUPERVISION
AMBULATION ASSISTANCE: 1
PHYSICAL TRANSFERS ASSESSED: 1
DRESSING_UB_CURRENT_FUNCTION: INDEPENDENT
BATHING ASSESSED: 1
AMBULATION ASSISTANCE: SUPERVISION

## 2024-06-26 ASSESSMENT — PAIN SCALES - PAIN ASSESSMENT IN ADVANCED DEMENTIA (PAINAD)
BODYLANGUAGE: 0
NEGVOCALIZATION: 0 - NONE.
CONSOLABILITY: 0 - NO NEED TO CONSOLE.
FACIALEXPRESSION: 0
NEGVOCALIZATION: 0
FACIALEXPRESSION: 0 - SMILING OR INEXPRESSIVE.
BODYLANGUAGE: 0 - RELAXED.
TOTALSCORE: 0
BREATHING: 0
CONSOLABILITY: 0

## 2024-06-26 ASSESSMENT — ENCOUNTER SYMPTOMS
PERSON REPORTING PAIN: PATIENT
LAST BOWEL MOVEMENT: 67016
DEPRESSION: 0
LOSS OF SENSATION IN FEET: 0
PERSON REPORTING PAIN: PATIENT
APPETITE LEVEL: FAIR
PAIN: 1
PAIN LOCATION - PAIN SEVERITY: 1/10
HIGHEST PAIN SEVERITY IN PAST 24 HOURS: 2/10
PAIN LOCATION - PAIN QUALITY: SORE
DENIES PAIN: 1
LOWEST PAIN SEVERITY IN PAST 24 HOURS: 0/10
CHANGE IN APPETITE: UNCHANGED
PAIN LOCATION: LEFT LEG
PAIN LOCATION - PAIN SEVERITY: 2/10
PAIN LOCATION - PAIN FREQUENCY: INTERMITTENT
PAIN: 1
PAIN SEVERITY GOAL: 0/10
OCCASIONAL FEELINGS OF UNSTEADINESS: 0
PAIN LOCATION: LEFT ANKLE
SUBJECTIVE PAIN PROGRESSION: UNCHANGED

## 2024-06-28 ENCOUNTER — HOME CARE VISIT (OUTPATIENT)
Dept: HOME HEALTH SERVICES | Facility: HOME HEALTH | Age: 79
End: 2024-06-28
Payer: MEDICARE

## 2024-06-28 VITALS
DIASTOLIC BLOOD PRESSURE: 70 MMHG | HEART RATE: 76 BPM | SYSTOLIC BLOOD PRESSURE: 124 MMHG | RESPIRATION RATE: 20 BRPM | OXYGEN SATURATION: 95 % | TEMPERATURE: 96.5 F

## 2024-06-28 PROCEDURE — G0299 HHS/HOSPICE OF RN EA 15 MIN: HCPCS | Mod: HHH

## 2024-06-28 PROCEDURE — G0157 HHC PT ASSISTANT EA 15: HCPCS | Mod: HHH

## 2024-06-28 ASSESSMENT — PAIN SCALES - PAIN ASSESSMENT IN ADVANCED DEMENTIA (PAINAD)
TOTALSCORE: 0
FACIALEXPRESSION: 0
BODYLANGUAGE: 0 - RELAXED.
BODYLANGUAGE: 0
BREATHING: 0
CONSOLABILITY: 0 - NO NEED TO CONSOLE.
CONSOLABILITY: 0
NEGVOCALIZATION: 0
FACIALEXPRESSION: 0 - SMILING OR INEXPRESSIVE.
NEGVOCALIZATION: 0 - NONE.

## 2024-06-28 ASSESSMENT — ENCOUNTER SYMPTOMS
HIGHEST PAIN SEVERITY IN PAST 24 HOURS: 0/10
PAIN SEVERITY GOAL: 0/10
PAIN SEVERITY GOAL: 0/10
PERSON REPORTING PAIN: PATIENT
HIGHEST PAIN SEVERITY IN PAST 24 HOURS: 3/10
LOWEST PAIN SEVERITY IN PAST 24 HOURS: 0/10
PAIN: 1
DENIES PAIN: 1
PERSON REPORTING PAIN: PATIENT
LOSS OF SENSATION IN FEET: 0
LAST BOWEL MOVEMENT: 67019
DEPRESSION: 0
OCCASIONAL FEELINGS OF UNSTEADINESS: 0
LOWEST PAIN SEVERITY IN PAST 24 HOURS: 0/10
APPETITE LEVEL: GOOD
SUBJECTIVE PAIN PROGRESSION: UNCHANGED
CHANGE IN APPETITE: UNCHANGED

## 2024-06-28 NOTE — HOME HEALTH
SKILLED NURSING VISIT FOR ASSESSMENT TEACHING OF MEDICATIONS DISEASE PROCESS AND MEDICATIONS. PT TOLERATED PROCEDURES WELL. LLE CAST DRY AND INTACT. LEFT TOES PINK AND MOBILE. PT DENIES ANY NUMBESS OR TINGLING OF LEFT TOES. PT REPORTS PAIN IS MANAGED.

## 2024-06-29 SDOH — HEALTH STABILITY: PHYSICAL HEALTH
EXERCISE COMMENTS: LLY REVIEWED WITH DEMONSTRATION     QUAD SETS AND GLUTE SETS     ACTIVE RIGHT ANKLE PUMPS IN FULL AVAILABLE ROM     HEELSLIDES

## 2024-06-29 SDOH — HEALTH STABILITY: PHYSICAL HEALTH
EXERCISE COMMENTS: SIT:      LAQ WITH DORSI ON RIGHT     MARCH     UNSUPPORTED SIT UE REACHING OVERHEAD AND CROSS MIDLINE       STAND:      PROLONGED SELF SUPPORTED STAND, 4 MINS TODAY.  WBAT/MINIMAL WB ON LEFT CAST.       UE AROM AND TRUNK ELONGATION    SUPINE:  VERBA

## 2024-07-03 ENCOUNTER — HOME CARE VISIT (OUTPATIENT)
Dept: HOME HEALTH SERVICES | Facility: HOME HEALTH | Age: 79
End: 2024-07-03
Payer: MEDICARE

## 2024-07-03 PROCEDURE — G0157 HHC PT ASSISTANT EA 15: HCPCS | Mod: HHH

## 2024-07-05 ENCOUNTER — HOME CARE VISIT (OUTPATIENT)
Dept: HOME HEALTH SERVICES | Facility: HOME HEALTH | Age: 79
End: 2024-07-05
Payer: MEDICARE

## 2024-07-05 VITALS
OXYGEN SATURATION: 99 % | TEMPERATURE: 97 F | RESPIRATION RATE: 20 BRPM | HEART RATE: 72 BPM | SYSTOLIC BLOOD PRESSURE: 124 MMHG | DIASTOLIC BLOOD PRESSURE: 66 MMHG

## 2024-07-05 PROCEDURE — G0299 HHS/HOSPICE OF RN EA 15 MIN: HCPCS | Mod: HHH

## 2024-07-05 SDOH — HEALTH STABILITY: PHYSICAL HEALTH
EXERCISE COMMENTS: ELONGATION      SUPINE:        QUAD SETS AND GLUTE SETS       ACTIVE RIGHT ANKLE PUMPS IN FULL AVAILABLE ROM       HEELSLIDES

## 2024-07-05 SDOH — HEALTH STABILITY: PHYSICAL HEALTH
EXERCISE COMMENTS: SIT:       LAQ WITH DORSI ON RIGHT       MARCH       UNSUPPORTED SIT UE REACHING OVERHEAD AND CROSS MIDLINE       WC PUSH UPS          STAND:       PROLONGED SELF SUPPORTED STAND, 4 MINS TODAY.  WBAT/MINIMAL WB ON LEFT CAST.         UE AROM AND TRUNK

## 2024-07-05 ASSESSMENT — ENCOUNTER SYMPTOMS
CHANGE IN APPETITE: UNCHANGED
SUBJECTIVE PAIN PROGRESSION: UNCHANGED
OCCASIONAL FEELINGS OF UNSTEADINESS: 0
DEPRESSION: 0
LOWEST PAIN SEVERITY IN PAST 24 HOURS: 0/10
APPETITE LEVEL: GOOD
LOSS OF SENSATION IN FEET: 0
LAST BOWEL MOVEMENT: 67026
HIGHEST PAIN SEVERITY IN PAST 24 HOURS: 0/10
PERSON REPORTING PAIN: PATIENT
DENIES PAIN: 1

## 2024-07-05 ASSESSMENT — PAIN SCALES - PAIN ASSESSMENT IN ADVANCED DEMENTIA (PAINAD)
BREATHING: 0
NEGVOCALIZATION: 0
CONSOLABILITY: 0
BODYLANGUAGE: 0
FACIALEXPRESSION: 0
BODYLANGUAGE: 0 - RELAXED.
CONSOLABILITY: 0 - NO NEED TO CONSOLE.
NEGVOCALIZATION: 0 - NONE.
TOTALSCORE: 0
FACIALEXPRESSION: 0 - SMILING OR INEXPRESSIVE.

## 2024-07-05 NOTE — HOME HEALTH
SKILLED NURSING VISIT FOR ASSESSMENT. PT TOLERATED PROCEDURES WELL. CAST LLE REMAINS DRY AND INTACT. TOES PINK WARM AND MOBILE. PTS FAMILY STAYING WITH PT FOR SUPPORT. Standing/Walking/Toileting

## 2024-07-10 ENCOUNTER — HOME CARE VISIT (OUTPATIENT)
Dept: HOME HEALTH SERVICES | Facility: HOME HEALTH | Age: 79
End: 2024-07-10
Payer: MEDICARE

## 2024-07-10 PROCEDURE — G0157 HHC PT ASSISTANT EA 15: HCPCS | Mod: HHH

## 2024-07-10 SDOH — HEALTH STABILITY: PHYSICAL HEALTH
EXERCISE COMMENTS: AND TRUNK ELONGATION       SUPINE:          QUAD SETS AND GLUTE SETS        ACTIVE RIGHT ANKLE PUMPS IN FULL AVAILABLE ROM        HEELSLIDES

## 2024-07-10 SDOH — HEALTH STABILITY: PHYSICAL HEALTH
EXERCISE COMMENTS: SIT:        LAQ WITH DORSI ON RIGHT        MARCH        UNSUPPORTED SIT UE REACHING OVERHEAD AND CROSS MIDLINE        WC PUSH UPS              STAND:        PROLONGED SELF SUPPORTED STAND, 4 MINS TODAY.  WBAT/MINIMAL WB ON LEFT CAST.          UE AROM

## 2024-07-10 SDOH — ECONOMIC STABILITY: HOUSING INSECURITY: HOME SAFETY: SELF SUPPORT, NO REACHING AND PUTTING ITEMS AWAY IN CUPBOARDS AT THIS TIME.

## 2024-07-10 SDOH — ECONOMIC STABILITY: HOUSING INSECURITY
HOME SAFETY: PT DISPLAYS INSTABILITY STANDING AT KITCHEN COUNTER AND WALKER DUE TO CASTING ON LEFT LE AND OCC DISCOMFORT AT RIGHT SHOULDER.  PT INSTRUCTED TO ALLOW FAMILY TO ASSIST MORE IN THE KITCHEN, HER STANDING ACTIVITY SHOULLD BE THEREPEUTIC WITH 2 HAND HOLD

## 2024-07-10 ASSESSMENT — ENCOUNTER SYMPTOMS
PAIN LOCATION - PAIN SEVERITY: 2/10
PAIN: 1
PAIN LOCATION: RIGHT LEG
PAIN LOCATION: RIGHT SHOULDER
PAIN LOCATION - PAIN SEVERITY: 2/10
PERSON REPORTING PAIN: PATIENT

## 2024-07-12 ENCOUNTER — HOME CARE VISIT (OUTPATIENT)
Dept: HOME HEALTH SERVICES | Facility: HOME HEALTH | Age: 79
End: 2024-07-12
Payer: MEDICARE

## 2024-07-12 PROCEDURE — G0299 HHS/HOSPICE OF RN EA 15 MIN: HCPCS | Mod: HHH

## 2024-07-12 PROCEDURE — G0157 HHC PT ASSISTANT EA 15: HCPCS | Mod: HHH

## 2024-07-12 SDOH — HEALTH STABILITY: PHYSICAL HEALTH
EXERCISE COMMENTS: (REVIEWED VERBALLY)          QUAD SETS AND GLUTE SETS         ACTIVE RIGHT ANKLE PUMPS IN FULL AVAILABLE ROM WTIH ELEVATION FOR EDEMA CONTROL        HEELSLIDES WITH NEUTRAL HIP POSITION

## 2024-07-12 SDOH — HEALTH STABILITY: PHYSICAL HEALTH
EXERCISE COMMENTS: SIT:         LAQ WITH DORSI ON RIGHT         MARCH         UNSUPPORTED SIT UE REACHING OVERHEAD AND CROSS MIDLINE         WC PUSH UPS                 STAND:         PROLONGED SELF SUPPORTED STAND, 6 MINS TODAY.         TRUNK ELONGATION        SUPINE:

## 2024-07-12 ASSESSMENT — ENCOUNTER SYMPTOMS
HIGHEST PAIN SEVERITY IN PAST 24 HOURS: 0/10
DENIES PAIN: 1
APPETITE LEVEL: GOOD
CHANGE IN APPETITE: UNCHANGED
DEPRESSION: 0
LAST BOWEL MOVEMENT: 67033
PERSON REPORTING PAIN: PATIENT
PERSON REPORTING PAIN: PATIENT
LOWEST PAIN SEVERITY IN PAST 24 HOURS: 0/10
PAIN SEVERITY GOAL: 0/10
DENIES PAIN: 1
LOSS OF SENSATION IN FEET: 0
OCCASIONAL FEELINGS OF UNSTEADINESS: 0

## 2024-07-12 ASSESSMENT — PAIN SCALES - PAIN ASSESSMENT IN ADVANCED DEMENTIA (PAINAD)
NEGVOCALIZATION: 0 - NONE.
NEGVOCALIZATION: 0
FACIALEXPRESSION: 0
FACIALEXPRESSION: 0 - SMILING OR INEXPRESSIVE.
CONSOLABILITY: 0 - NO NEED TO CONSOLE.
BREATHING: 0
CONSOLABILITY: 0
TOTALSCORE: 0
BODYLANGUAGE: 0 - RELAXED.
BODYLANGUAGE: 0

## 2024-07-13 NOTE — HOME HEALTH
SKILLED NURSING VISIT FOR ASSESSMENT AND DISCIPLINE DISCHARGE. MEDICATIONS RECONCILLED. LLE CAST DRY AND INTACT. SN INSTRUCTED PT TO ELEVATE BLE THROUGHOUT THE DAY TO MINIMIZE EDEMA. LEFT TOES PINK WARM AND MOBILE. PT DENIES ANY NUMBNESS OR TINGLINGLING. CAST TO BE REMOVED NEXT WEEK.

## 2024-07-15 ENCOUNTER — APPOINTMENT (OUTPATIENT)
Dept: PHYSICAL THERAPY | Facility: CLINIC | Age: 79
End: 2024-07-15
Payer: MEDICARE

## 2024-07-17 ENCOUNTER — HOME CARE VISIT (OUTPATIENT)
Dept: HOME HEALTH SERVICES | Facility: HOME HEALTH | Age: 79
End: 2024-07-17
Payer: MEDICARE

## 2024-07-17 PROCEDURE — G0157 HHC PT ASSISTANT EA 15: HCPCS | Mod: HHH

## 2024-07-17 ASSESSMENT — ENCOUNTER SYMPTOMS
PERSON REPORTING PAIN: PATIENT
DENIES PAIN: 1

## 2024-07-18 SDOH — HEALTH STABILITY: PHYSICAL HEALTH
EXERCISE COMMENTS: ACTIVE RIGHT ANKLE PUMPS IN FULL AVAILABLE ROM WTIH ELEVATION FOR EDEMA CONTROL          HEELSLIDES WITH NEUTRAL HIP POSITION    PT HAS NOT BEEN ADEQUATELY ELEVATING BILAT LE.  INSTRUCTED TO PERFORM SUPINE THER EX AND ELEVATE BILAT LE IN BED

## 2024-07-18 SDOH — HEALTH STABILITY: PHYSICAL HEALTH
EXERCISE COMMENTS: TO RIGHT LE AND AVOID LEFT WB                   STAND:          PROLONGED SELF SUPPORTED STAND, 6 MINS TODAY.            TRUNK ELONGATION          SMALL LEFT MINI SQUAT, X7 TODAY        SUPINE: (REVIEWED VERBALLY)            QUAD SETS AND GLUTE SETS

## 2024-07-18 SDOH — HEALTH STABILITY: PHYSICAL HEALTH
EXERCISE COMMENTS: SIT:          LAQ WITH DORSI ON RIGHT, TOE MOTION ON LEFT         MARCH, ALTERNATING.           ALT MARCH WITH UE REACHING RECIPROCATING         UNSUPPORTED SIT UE REACHING OVERHEAD AND CROSS MIDLINE          WC PUSH UPS WITH CUES FOR ANT WT SHIFT IN

## 2024-07-18 SDOH — HEALTH STABILITY: PHYSICAL HEALTH: EXERCISE COMMENTS: 2X DAILY 20 MINS EACH SESSION.

## 2024-07-19 ENCOUNTER — APPOINTMENT (OUTPATIENT)
Dept: HOME HEALTH SERVICES | Facility: HOME HEALTH | Age: 79
End: 2024-07-19
Payer: MEDICARE

## 2024-07-22 ENCOUNTER — APPOINTMENT (OUTPATIENT)
Dept: PHYSICAL THERAPY | Facility: CLINIC | Age: 79
End: 2024-07-22
Payer: MEDICARE

## 2024-07-24 ENCOUNTER — HOME CARE VISIT (OUTPATIENT)
Dept: HOME HEALTH SERVICES | Facility: HOME HEALTH | Age: 79
End: 2024-07-24
Payer: MEDICARE

## 2024-07-24 VITALS
HEART RATE: 80 BPM | OXYGEN SATURATION: 98 % | RESPIRATION RATE: 14 BRPM | SYSTOLIC BLOOD PRESSURE: 123 MMHG | DIASTOLIC BLOOD PRESSURE: 81 MMHG

## 2024-07-24 PROCEDURE — G0151 HHCP-SERV OF PT,EA 15 MIN: HCPCS | Mod: HHH

## 2024-07-24 ASSESSMENT — ACTIVITIES OF DAILY LIVING (ADL)
OASIS_M1830: 01
HOME_HEALTH_OASIS: 00

## 2024-07-29 ENCOUNTER — APPOINTMENT (OUTPATIENT)
Dept: PHYSICAL THERAPY | Facility: CLINIC | Age: 79
End: 2024-07-29
Payer: MEDICARE

## 2024-07-31 NOTE — PROGRESS NOTES
Physical Therapy    Physical Therapy Evaluation and Treatment      Patient Name: Zena Baxter  MRN: [id number]  Today's Date: 8/1/24    Time Entry:   Time Calculation  Start Time: 1007  Stop Time: 1104  Time Calculation (min): 57 min  PT Evaluation Time Entry  PT Evaluation (Low) Time Entry: 42  PT Therapeutic Procedures Time Entry  Therapeutic Exercise Time Entry: 15                     2024  90 DAY 11/1/2024 $1111.99 USED     Assessment:  PT Assessment  PT Assessment Results: Decreased strength, Decreased range of motion, Decreased endurance, Impaired balance, Decreased mobility, Impaired sensation, Pain, Decreased skin integrity  Rehab Prognosis: Good   Zena Baxter presents to physical therapy today s/p L ankle arthrodesis and achilles tendon lengthening on 6/11/24. She demonstrates resultant L ankle edema, decreased ROM and strength which impacts her functional mobility. Reviewed with patient her current weight bearing restrictions at 50% on L LE and educated pt and son on how to carry out with use of FWW. Pt will benefit from PT to improve LE strength and ROM to restore her functional mobility.     Plan:  OP PT Plan  Treatment/Interventions: Cryotherapy, Education/ Instruction, Gait training, Manual therapy, Neuromuscular re-education, Therapeutic activities, Therapeutic exercises  PT Plan: Skilled PT  PT Frequency: Other (Comment) (1-2x/week)  Duration: 10 visits  Certification Period Start Date: 08/02/24  Certification Period End Date: 10/31/24  Rehab Potential: Good  Plan of Care Agreement: Patient    Current Problem:   1. Ankle stiffness, left        2. Pes planus of left foot  Referral to Physical Therapy      3. Difficulty walking        4. Edema of left ankle            Subjective    General:  General  Reason for Referral: M21.42 (ICD-10-CM) - Pes planus of left foot  Referred By: Uma Wisdom PA-C  Pt arrives at session with her son, Jimmy, chief complaint of s/p arthrodesis and achilles  "tendon lengthening on 6/11/24. (Pt is 7.2 weeks post-op this date.) Pt arrives at session with FWW and CAM boot.   Pt was Dc'd from [facility] to [facility] for 10 day stay. Was Dc'd home with CPT and was Dc'd from home care 7/24/24.     To see her surgeon on 8/14/24- Dr. Can @ [facility].      History:  Severe rigid left foot pes planus with subfibular impingement and near complete subtalar subluxation/dislocation      Current HEP:   Sit to stands with NWBing R LE  Seated marching   LAQs  SLS on R LE    Pain #: current: 0/10- not taking anything for pain  Location: L ankle/foot  Descriptor: denies.  Better: rest  Worse: activity  Numbness/tingling: denies.   Difficult Activities: transfers, gait, self care, stairs  Goals: \"To be able to walk on my left foot without a cane.\"   PMH/surgical history: CA- NHL from 2019, chemotherapy induced neuropathy    Precautions:  Precautions  STEADI Fall Risk Score (The score of 4 or more indicates an increased risk of falling): 950% weight bearing in CAM walker boot at appt 7/23/24 (per pt report, unable to see notes).     Vital Signs:     Pain:  Pain Assessment  Pain Assessment: 0-10  0-10 (Numeric) Pain Score: 0 - No pain  Home Living:     Prior Level of Function:   Pt currently not driving secondary to her surgery, and plans to utilize friends to assist with transportation to/from PT appts until she is cleared to drive.     Objective   Cognition:   WFL.   General Assessments:  Posture:   Fwd head, rounded shoulders, flexed posturing with FWW. Arrives at session with CAM boot.     Palpation:   Tender over catrina-incisional area    Transfers:  Sit <> stand: MOD I  Supine <> Sit: MOD I    Gait:   Pt ambulated with FWW with step to gait pattern. Cues provided to assist with maintaining 50% Wbing restriction.     Hip Musculoskeletal Exam    Range of Motion    Right      Passive flexion: 120.       Passive internal rotation: 30.       Passive external rotation: 45.     " Left      Passive flexion: 120.       Passive internal rotation: 30.       Passive external rotation: 45.     Strength    Right      Flexion: 4+/5.     Left      Flexion: 5/5.      Knee Musculoskeletal Exam    Range of Motion    Range of motion additional comments: Knee ROM WFL B.    Strength    Right      Extension: 5/5.       Flexion: 4+/5.     Left      Extension: 4+/5.       Flexion: 4/5.     Foot/Ankle Musculoskeletal Exam    Inspection    Left      Edema: moderate        Previous foot incision: forefoot and hindfoot      Previous ankle incision: anterior, lateral and posterior      Incision: clean, dry and intact        Incisional drainage: none    Range of Motion    Right      Active Dorsiflexion: 0      Passive Dorsiflexion: 5      Active Plantar Flexion: 25      Passive Inversion: 25      Passive Eversion: 15    Left      Active Dorsiflexion: 0      Passive dorsiflexion: 4.      Active Plantar Flexion: 10      Passive Plantar Flexion: 10    Strength    Right      Tibialis anterior: 4-/5.       Gastroc/soleus: 5/5.       Tibialis posterior: 4-/5.       Peroneals: 4-/5.     Left      Tibialis anterior: 3/5.     Strength additional comments: Held MMT of L ankle/foot, however able to move into fair test positioning where listed     Outcome Measures:  Other Measures  Lower Extremity Funtional Score (LEFS): 35     Treatments:  Therapeutic Exercise (99893): 15 minutes  Access Code: [email or web address]  URL: [email or web address]  Date: 08/01/2024  Prepared by: Deisy Zazueta    Exercises  - Active Straight Leg Raise with Quad Set (Mirrored)  - 1 x daily - 7 x weekly - 2-3 sets - 10 reps - 5 seconds hold  - Standing Hip Abduction (Mirrored)  - 2 x daily - 7 x weekly - 3 sets - 10 reps - 2 sec hold    Educated pt on weight shifting using scale to maintain 50% Wbing and added to HEP.     EDUCATION:  Outpatient Education  Individual(s) Educated: Patient, Child  Education Provided: Anatomy, Body Mechanics,  "Home Exercise Program, Fall Risk, POC, Physiology, Post-Op Precautions, Signs/Symptoms of Infection  Risk and Benefits Discussed with Patient/Caregiver/Other: yes  Patient/Caregiver Demonstrated Understanding: yes  Plan of Care Discussed and Agreed Upon: yes  Patient Response to Education: Patient/Caregiver Verbalized Understanding of Information    Goals:  By discharge, pt will meet the following goals:     Pt will demonstrate independence with home exercise program.  Pt will tolerate increased exercise without adverse reaction.   Pt will increase ROM of L ankle PF by 10 deg.   Pt will increase ROM of L ankle DF to 10 deg.    Pt will increase strength of L LE to at least 4+/5.   Pt will improve score of LEFS by 9 points to meet MCID.  Pt will ambulate MOD I with LRAD with minimal gait deviations.   Pt will ascend/descend FOS without limitation.   Pt will meet self-reported goal of: \"To be able to walk on my left foot without a cane.\"       "

## 2024-08-01 ENCOUNTER — EVALUATION (OUTPATIENT)
Dept: PHYSICAL THERAPY | Facility: CLINIC | Age: 79
End: 2024-08-01
Payer: MEDICARE

## 2024-08-01 DIAGNOSIS — M25.672 ANKLE STIFFNESS, LEFT: Primary | ICD-10-CM

## 2024-08-01 DIAGNOSIS — R26.2 DIFFICULTY WALKING: ICD-10-CM

## 2024-08-01 DIAGNOSIS — M21.42 PES PLANUS OF LEFT FOOT: ICD-10-CM

## 2024-08-01 DIAGNOSIS — M25.472 EDEMA OF LEFT ANKLE: ICD-10-CM

## 2024-08-01 PROCEDURE — 97161 PT EVAL LOW COMPLEX 20 MIN: CPT | Mod: GP

## 2024-08-01 PROCEDURE — 97110 THERAPEUTIC EXERCISES: CPT | Mod: GP

## 2024-08-01 ASSESSMENT — PAIN - FUNCTIONAL ASSESSMENT: PAIN_FUNCTIONAL_ASSESSMENT: 0-10

## 2024-08-01 ASSESSMENT — PAIN SCALES - GENERAL: PAINLEVEL_OUTOF10: 0 - NO PAIN

## 2024-08-02 PROBLEM — M25.672 ANKLE STIFFNESS, LEFT: Status: ACTIVE | Noted: 2024-08-02

## 2024-08-02 PROBLEM — M25.472 EDEMA OF LEFT ANKLE: Status: ACTIVE | Noted: 2024-08-02

## 2024-08-02 ASSESSMENT — ENCOUNTER SYMPTOMS
LOSS OF SENSATION IN FEET: 1
DEPRESSION: 0
OCCASIONAL FEELINGS OF UNSTEADINESS: 1

## 2024-08-07 NOTE — PROGRESS NOTES
Physical Therapy    Physical Therapy Treatment    Patient Name: Zena Baxter  MRN: [id number]  Today's Date: 8/8/2024    Time Entry:   Time Calculation  Start Time: 1332  Stop Time: 1417  Time Calculation (min): 45 min     PT Therapeutic Procedures Time Entry  Therapeutic Exercise Time Entry: 45                 Visit #2 of 10   2024  90 DAY 11/1/2024 $1111.99 USED     Assessment:   Pt tolerated session well without adverse effect and is making progress toward goals. Pt tolerates progressions of strengthening well. Continued to work on 50% Wbing restriction. Pt benefits from cues during gait to comply with Wbing restrictions. Pt will continue to benefit from PT to address her L LE strength and ROM to return to PLOF.     Plan:   Review new HEP: bridges with bolster, HS curls with TB & knee extension with TB or ankle weight   S/L clamshells  S/L hip ABD  S/L hip ADD  Prone HS curl  Prone hip EXT   Standing hip ABD  Standing hip EXT     Current Problem  1. Difficulty walking        2. Ankle stiffness, left        3. Edema of left ankle          General   **Pt is s/p L ankle arthrodesis and achilles tendon lengthening on 6/11/24**  **She is 8.2 weeks post-op today**  Pt arrives at session having been dropped off by a friend. Reports her L ankle feels smaller. Currently out of power at home. Has been working on her HEP.      Subjective    Precautions  Precautions  STEADI Fall Risk Score (The score of 4 or more indicates an increased risk of falling): 9  LE Weight Bearing Status: Other (Comment) (50% L LE, progressing to 75% in 1 week, then to full WBing the following week in CAM boot)  Vital Signs     Pain  Pain Assessment  Pain Assessment: 0-10  0-10 (Numeric) Pain Score: 0 - No pain    Objective     Treatments:  Therapeutic Exercise (95916): 45 minutes  Reviewed Current HEP:   Supine quad set + SLR 3x10 reps with 5-10 sec holds, cues for improved neutral positioning of L LE  Standing hip ABD   Access Code: [email or web address]  "    Hooklying:   Bridges- legs on bolster 2x10 reps     Seated:   LAQ with 3# ankle weight  Resisted HS curls with blue TB     Worked with pt on weight shifting with scale for 50% Wbing x20 reps    Home Care HEP:   --Sit to stands with NWBing R LE  --Seated marching   --LAQs  --SLS on R LE    Goals:  By discharge, pt will meet the following goals:      Pt will demonstrate independence with home exercise program.  Pt will tolerate increased exercise without adverse reaction.   Pt will increase ROM of L ankle PF by 10 deg.   Pt will increase ROM of L ankle DF to 10 deg.    Pt will increase strength of L LE to at least 4+/5.   Pt will improve score of LEFS by 9 points to meet MCID.  Pt will ambulate MOD I with LRAD with minimal gait deviations.   Pt will ascend/descend FOS without limitation.   Pt will meet self-reported goal of: \"To be able to walk on my left foot without a cane.\"  "

## 2024-08-08 ENCOUNTER — TREATMENT (OUTPATIENT)
Dept: PHYSICAL THERAPY | Facility: CLINIC | Age: 79
End: 2024-08-08
Payer: MEDICARE

## 2024-08-08 DIAGNOSIS — M25.472 EDEMA OF LEFT ANKLE: ICD-10-CM

## 2024-08-08 DIAGNOSIS — M25.672 ANKLE STIFFNESS, LEFT: ICD-10-CM

## 2024-08-08 DIAGNOSIS — R26.2 DIFFICULTY WALKING: Primary | ICD-10-CM

## 2024-08-08 PROCEDURE — 97110 THERAPEUTIC EXERCISES: CPT | Mod: GP

## 2024-08-08 ASSESSMENT — PAIN SCALES - GENERAL: PAINLEVEL_OUTOF10: 0 - NO PAIN

## 2024-08-08 ASSESSMENT — PAIN - FUNCTIONAL ASSESSMENT: PAIN_FUNCTIONAL_ASSESSMENT: 0-10

## 2024-08-14 NOTE — PROGRESS NOTES
Physical Therapy    Physical Therapy Treatment    Patient Name: Zena Baxter  MRN: [id number]  Today's Date: 8/15/2024    Time Entry:   Time Calculation  Start Time: 1334  Stop Time: 1418  Time Calculation (min): 44 min     PT Therapeutic Procedures Time Entry  Therapeutic Exercise Time Entry: 44                 Visit #3 of 10   2024  90 DAY 11/1/2024 $1111.99 USED     Assessment:   Pt tolerated session well without adverse effect and is making progress toward goals. Pt saw her surgeon since last visit and is now WBAT with CAM boot and FWW. She tolerates all strength progressions well and performs more work in standing. Pt will continue to benefit from PT to address her L LE strength and ROM to return to PLOF.     Plan:   Review new HEP: squats with B UE support, weight shifting AP & lateral, side stepping.   Work on seated foot intrinsics, ankle DF ROM.   If needed:   S/L clamshells  S/L hip ABD  S/L hip ADD  Prone HS curl  Prone hip EXT   Standing hip ABD  Standing hip EXT    Current Problem  1. Difficulty walking        2. Ankle stiffness, left        3. Edema of left ankle          General   **Pt is s/p L ankle arthrodesis and achilles tendon lengthening on 6/11/24**  **She is 9.2 weeks post-op today**  Pt arrives at session having been dropped off by a friend. Reports she saw her surgeon yesterday. She is now WBAT in CAM, and was told she can start walking without FWW in 2 weeks. Is to see surgeon on 9/4/24.      Subjective    Precautions  Precautions  STEADI Fall Risk Score (The score of 4 or more indicates an increased risk of falling): 9  LE Weight Bearing Status: Weight Bearing as Tolerated (in CAM boot with FWW)  Vital Signs     Pain  Pain Assessment  Pain Assessment: 0-10  0-10 (Numeric) Pain Score: 0 - No pain    Objective     Treatments:  Therapeutic Exercise (88393): 44 minutes  Reviewed Current HEP:   Supine quad set + SLR 3x10 reps with 5-10 sec holds, cues for improved neutral positioning of L  "LE  Standing hip ABD   Bridges- legs on bolster 2x10 reps  LAQ with 3# ankle weight or TB  Resisted HS curls with blue TB    Mini squats (added today)   Lateral & AP/staggered weight shifting (added today)   Access Code: [email or web address]     Squats with B UE support 3x10 reps   Standing lateral weight shifting 2x10  Standing AP weight shifting 2x10   Side stepping with countertop x2 min    Home Care HEP:   --Sit to stands with NWBing R LE  --Seated marching   --LAQs  --SLS on R LE    Goals:  By discharge, pt will meet the following goals:      Pt will demonstrate independence with home exercise program.  Pt will tolerate increased exercise without adverse reaction.   Pt will increase ROM of L ankle PF by 10 deg.   Pt will increase ROM of L ankle DF to 10 deg.    Pt will increase strength of L LE to at least 4+/5.   Pt will improve score of LEFS by 9 points to meet MCID.  Pt will ambulate MOD I with LRAD with minimal gait deviations.   Pt will ascend/descend FOS without limitation.   Pt will meet self-reported goal of: \"To be able to walk on my left foot without a cane.\"  "

## 2024-08-15 ENCOUNTER — TREATMENT (OUTPATIENT)
Dept: PHYSICAL THERAPY | Facility: CLINIC | Age: 79
End: 2024-08-15
Payer: MEDICARE

## 2024-08-15 DIAGNOSIS — R26.2 DIFFICULTY WALKING: Primary | ICD-10-CM

## 2024-08-15 DIAGNOSIS — M25.472 EDEMA OF LEFT ANKLE: ICD-10-CM

## 2024-08-15 DIAGNOSIS — M25.672 ANKLE STIFFNESS, LEFT: ICD-10-CM

## 2024-08-15 PROCEDURE — 97110 THERAPEUTIC EXERCISES: CPT | Mod: GP

## 2024-08-15 ASSESSMENT — PAIN - FUNCTIONAL ASSESSMENT: PAIN_FUNCTIONAL_ASSESSMENT: 0-10

## 2024-08-15 ASSESSMENT — PAIN SCALES - GENERAL: PAINLEVEL_OUTOF10: 0 - NO PAIN

## 2024-08-23 ENCOUNTER — TREATMENT (OUTPATIENT)
Dept: PHYSICAL THERAPY | Facility: CLINIC | Age: 79
End: 2024-08-23
Payer: MEDICARE

## 2024-08-23 DIAGNOSIS — R26.2 DIFFICULTY WALKING: ICD-10-CM

## 2024-08-23 DIAGNOSIS — M25.672 ANKLE STIFFNESS, LEFT: Primary | ICD-10-CM

## 2024-08-23 DIAGNOSIS — M25.472 EDEMA OF LEFT ANKLE: ICD-10-CM

## 2024-08-23 PROCEDURE — 97110 THERAPEUTIC EXERCISES: CPT | Mod: GP,CQ

## 2024-08-23 NOTE — PROGRESS NOTES
Physical Therapy    Physical Therapy Treatment    Patient Name: Zena Baxter  MRN: [id number]  Today's Date: 8/24/2024    Time Entry:   Time Calculation  Start Time: 1430  Stop Time: 1515  Time Calculation (min): 45 min     PT Therapeutic Procedures Time Entry  Therapeutic Exercise Time Entry: 45                 Visit #4 of 10   2024  90 DAY 11/1/2024 $1111.99 USED     Assessment:   Pt tolerated session well without adverse effect and is making progress toward goals. Pt. Tolerated the addition of seated  ankle dorsiflexion and foot intrinsics well with towel scrunching challenging. . Pt will continue to benefit from PT to address her L LE strength and ROM to return to PLOF.     Plan:   Review new HEP: squats with B UE support, weight shifting AP & lateral, side stepping.   Work on seated foot intrinsics, ankle DF ROM.   If needed:   S/L clamshells  S/L hip ABD  S/L hip ADD  Prone HS curl  Prone hip EXT   Standing hip ABD  Standing hip EXT    Current Problem  1. Ankle stiffness, left        2. Difficulty walking        3. Edema of left ankle            General   **Pt is s/p L ankle arthrodesis and achilles tendon lengthening on 6/11/24**  **She is 10.3 weeks post-op today**  Pt. States she Drove herself today.  States wasn't very diligent with her exercises this week.  She is now WBAT in CAM, and was told she can start walking without FWW in another week. Is to see surgeon on 9/4/24.      Subjective    Precautions   STEADI Fall Risk Score (The score of 4 or more indicates an increased risk of falling): 9  LE Weight Bearing Status: Weight Bearing as Tolerated (in CAM boot with FWW)     Pain   0/10    Objective     Treatments:  Therapeutic Exercise (49899): 45 minutes  Reviewed squats with both anterior support 2 x 10 reps  Standing lateral and AP weight shifts  Side stepping along // bar    In sitting: performed ankle dorsiflexion                   Toe extension  x 20 reps                    Attempted towel  "scrunches as able.                    Laq's and seated hamstring curls with blue t-band x 20                     Reps.     Current HEP:   Supine quad set + SLR 3x10 reps with 5-10 sec holds, cues for improved neutral positioning of L LE  Standing hip ABD   Bridges- legs on bolster 2x10 reps  LAQ with 3# ankle weight or TB  Resisted HS curls with blue TB    Mini squats   Lateral & AP/staggered weight shifting  Access Code: [email or web address]     Squats with B UE support 3x10 reps   Standing lateral weight shifting 2x10  Standing AP weight shifting 2x10   Side stepping with countertop x2 min    Home Care HEP:   --Sit to stands with NWBing R LE  --Seated marching   --LAQs  --SLS on R LE    Goals:  By discharge, pt will meet the following goals:      Pt will demonstrate independence with home exercise program.  Pt will tolerate increased exercise without adverse reaction.   Pt will increase ROM of L ankle PF by 10 deg.   Pt will increase ROM of L ankle DF to 10 deg.    Pt will increase strength of L LE to at least 4+/5.   Pt will improve score of LEFS by 9 points to meet MCID.  Pt will ambulate MOD I with LRAD with minimal gait deviations.   Pt will ascend/descend FOS without limitation.   Pt will meet self-reported goal of: \"To be able to walk on my left foot without a cane.\"  "

## 2024-08-30 ENCOUNTER — TREATMENT (OUTPATIENT)
Dept: PHYSICAL THERAPY | Facility: CLINIC | Age: 79
End: 2024-08-30
Payer: MEDICARE

## 2024-08-30 DIAGNOSIS — M25.472 EDEMA OF LEFT ANKLE: ICD-10-CM

## 2024-08-30 DIAGNOSIS — M25.672 ANKLE STIFFNESS, LEFT: Primary | ICD-10-CM

## 2024-08-30 DIAGNOSIS — R26.2 DIFFICULTY WALKING: ICD-10-CM

## 2024-08-30 PROCEDURE — 97110 THERAPEUTIC EXERCISES: CPT | Mod: GP,CQ

## 2024-08-30 NOTE — PROGRESS NOTES
Physical Therapy    Physical Therapy Treatment    Patient Name: Zena Baxter  MRN: [id number]  Today's Date: 8/30/2024    Time Entry:   Time Calculation  Start Time: 1138  Stop Time: 1202  Time Calculation (min): 24 min     PT Therapeutic Procedures Time Entry  Therapeutic Exercise Time Entry: 24                 Visit #5 of 10   2024  90 DAY 11/1/2024 $1111.99 USED     Assessment:   Pt tolerated session well without adverse effect and is making progress toward goals. Session limited due to late arrival.   She reports compliance with home program.  Will address walking without the wheeled walker next session and get her in the // bars. Pt will continue to benefit from PT to address her L LE strength and ROM to return to PLOF.     Plan:   Review new HEP: squats with B UE support, weight shifting AP & lateral, side stepping.   Work on seated foot intrinsics, ankle DF ROM.   If needed:   S/L clamshells  S/L hip ABD  S/L hip ADD  Prone HS curl  Prone hip EXT   Standing hip ABD  Standing hip EXT    Current Problem  1. Ankle stiffness, left        2. Difficulty walking        3. Edema of left ankle            General   **Pt is s/p L ankle arthrodesis and achilles tendon lengthening on 6/11/24**  **She is 11.3 weeks post-op today**  Pt. States she fell since last visit.  She is not 100% sure what happened.  She landed on her right hip.  She was standing with her walker and reached over for the wheelchair and it moved, had one hand on walker and one hand on the  chair moved and she moved and fell.  Pt. Was able to get herself up off the floor using the wheel chair.  Is to see surgeon on 9/4/24.      Subjective    Precautions   STEADI Fall Risk Score (The score of 4 or more indicates an increased risk of falling): 9  LE Weight Bearing Status: Weight Bearing as Tolerated (in CAM boot with FWW)     Pain   0/10    Objective     Treatments:  Therapeutic Exercise (75857): 24 minutes  Reviewed squats with both anterior support 2  "x 10 reps  Side stepping along // bar  In sitting: performed ankle dorsiflexion                   Toe extension  x 20 reps                    Attempted towel scrunches as able.                    Ankle inversion and eversion.    Did show patient a picture of an external shoe lift to make it higher so she would be more even with her CAM boot.     Current HEP:   Supine quad set + SLR 3x10 reps with 5-10 sec holds, cues for improved neutral positioning of L LE  Standing hip ABD   Bridges- legs on bolster 2x10 reps  LAQ with 3# ankle weight or TB  Resisted HS curls with blue TB    Mini squats   Lateral & AP/staggered weight shifting  Access Code: [email or web address]     Squats with B UE support 3x10 reps   Standing lateral weight shifting 2x10  Standing AP weight shifting 2x10   Side stepping with countertop x2 min    Home Care HEP:   --Sit to stands with NWBing R LE  --Seated marching   --LAQs  --SLS on R LE    Goals:  By discharge, pt will meet the following goals:      Pt will demonstrate independence with home exercise program.  Pt will tolerate increased exercise without adverse reaction.   Pt will increase ROM of L ankle PF by 10 deg.   Pt will increase ROM of L ankle DF to 10 deg.    Pt will increase strength of L LE to at least 4+/5.   Pt will improve score of LEFS by 9 points to meet MCID.  Pt will ambulate MOD I with LRAD with minimal gait deviations.   Pt will ascend/descend FOS without limitation.   Pt will meet self-reported goal of: \"To be able to walk on my left foot without a cane.\"  "

## 2024-09-06 ENCOUNTER — TREATMENT (OUTPATIENT)
Dept: PHYSICAL THERAPY | Facility: CLINIC | Age: 79
End: 2024-09-06
Payer: MEDICARE

## 2024-09-06 DIAGNOSIS — M25.472 EDEMA OF LEFT ANKLE: ICD-10-CM

## 2024-09-06 DIAGNOSIS — M25.672 ANKLE STIFFNESS, LEFT: Primary | ICD-10-CM

## 2024-09-06 DIAGNOSIS — R26.2 DIFFICULTY WALKING: ICD-10-CM

## 2024-09-06 NOTE — PROGRESS NOTES
Physical Therapy    Physical Therapy Treatment    Patient Name: Zena Baxter  MRN: [id number]  Today's Date: 9/6/2024    Time Entry:                           Visit #5 of 10   2024  90 DAY 11/1/2024 $1111.99 USED     Assessment:   Pt tolerated session well without adverse effect and is making progress toward goals. Session limited due to late arrival.   She reports compliance with home program.  Will address walking without the wheeled walker next session and get her in the // bars. Pt will continue to benefit from PT to address her L LE strength and ROM to return to PLOF.     Plan:   Review new HEP: squats with B UE support, weight shifting AP & lateral, side stepping.   Work on seated foot intrinsics, ankle DF ROM.   If needed:   S/L clamshells  S/L hip ABD  S/L hip ADD  Prone HS curl  Prone hip EXT   Standing hip ABD  Standing hip EXT    Current Problem  No diagnosis found.      General   **Pt is s/p L ankle arthrodesis and achilles tendon lengthening on 6/11/24**  **She is 11.3 weeks post-op today**  Pt. States she fell since last visit.  She is not 100% sure what happened.  She landed on her right hip.  She was standing with her walker and reached over for the wheelchair and it moved, had one hand on walker and one hand on the  chair moved and she moved and fell.  Pt. Was able to get herself up off the floor using the wheel chair.  Is to see surgeon on 9/4/24.      Subjective    Precautions   STEADI Fall Risk Score (The score of 4 or more indicates an increased risk of falling): 9  LE Weight Bearing Status: Weight Bearing as Tolerated (in CAM boot with FWW)     Pain   0/10    Objective     Treatments:  Therapeutic Exercise (82699): 24 minutes  Reviewed squats with both anterior support 2 x 10 reps  Side stepping along // bar  In sitting: performed ankle dorsiflexion                   Toe extension  x 20 reps                    Attempted towel scrunches as able.                    Ankle inversion and  "eversion.    Did show patient a picture of an external shoe lift to make it higher so she would be more even with her CAM boot.     Current HEP:   Supine quad set + SLR 3x10 reps with 5-10 sec holds, cues for improved neutral positioning of L LE  Standing hip ABD   Bridges- legs on bolster 2x10 reps  LAQ with 3# ankle weight or TB  Resisted HS curls with blue TB    Mini squats   Lateral & AP/staggered weight shifting  Access Code: [email or web address]     Squats with B UE support 3x10 reps   Standing lateral weight shifting 2x10  Standing AP weight shifting 2x10   Side stepping with countertop x2 min    Home Care HEP:   --Sit to stands with NWBing R LE  --Seated marching   --LAQs  --SLS on R LE    Goals:  By discharge, pt will meet the following goals:      Pt will demonstrate independence with home exercise program.  Pt will tolerate increased exercise without adverse reaction.   Pt will increase ROM of L ankle PF by 10 deg.   Pt will increase ROM of L ankle DF to 10 deg.    Pt will increase strength of L LE to at least 4+/5.   Pt will improve score of LEFS by 9 points to meet MCID.  Pt will ambulate MOD I with LRAD with minimal gait deviations.   Pt will ascend/descend FOS without limitation.   Pt will meet self-reported goal of: \"To be able to walk on my left foot without a cane.\"  "

## 2024-09-12 NOTE — PROGRESS NOTES
Physical Therapy    Physical Therapy Treatment    Patient Name: Zena Baxter  MRN: [id number]  Today's Date: 9/13/2024    Time Entry:   Time Calculation  Start Time: 1120  Stop Time: 1205  Time Calculation (min): 45 min     PT Therapeutic Procedures Time Entry  Therapeutic Exercise Time Entry: 35  Gait Training Time Entry: 10                 Visit #6 of 10   2024  90 DAY 11/1/2024 $1111.99 USED     Assessment:   Pt tolerated session well without adverse effect and is making progress toward goals. Pt is now WBAT without CAM boot. Progressed loading of Achilles tendon gradually. Benefits from quadriceps strengthening B. Practiced walking with SPC and pt with difficulty secondary to excessive compensated trendelenburg pattern. Pt will continue to benefit from PT to address her L LE strength and ROM to return to PLOF.     Plan:   Review new HEP: squats with B UE support, weight shifting AP & lateral, side stepping.   Work on seated foot intrinsics, ankle DF ROM.   If needed:   Tandem stance  Continue quad strengthening: cybex knee extension-eccentric focus  Progress to walking with SPC     Current Problem  1. Difficulty walking        2. Ankle stiffness, left        3. Edema of left ankle          General   **Pt is s/p L ankle arthrodesis and achilles tendon lengthening on 6/11/24**  **She is 13.3 weeks post-op today**  Pt states she has been progressed in her Wbing status to WBAT in a shoe. Initially was very swollen and her surgeon was concerned for DVT. Did not have any testing for DVT, however has noticed improvement in swelling. Is now able to get her shoe on. Primarily focused on elevation for a couple night.   Was able to get up/down from the floor to work on carpet for spot cleaning. Her son was around, but was able to independently get up/down from the floor. He was available to spot her. Was able to get in/out of the sunken living room, navigating 1 step.   No new falls.   Did try walking with a cane at  home and felt unsteady. Plans to use her FWW for a bit longer. Did try walking with the cane in either hand- however is aware in order to off-load L foot she is to use cane in R hand.   Went to a dinner last night that required steps. She had supervision/assistance with ascending steps and someone carried her walker.  Is to see her surgeon again in 10/2024.    Reports she has been very proud of herself for the new mobility skills.   Did try to water her plants and has to navigate over garden stepping stones and had to discontinue due to feel unsteady.   Has been intermittent with her HEP.   Feels she has not been sleeping enough and tires easily.        Subjective    Precautions  Precautions  STEADI Fall Risk Score (The score of 4 or more indicates an increased risk of falling): 9     Pain  Pain Assessment  Pain Assessment: 0-10  0-10 (Numeric) Pain Score: 0 - No pain    Objective   Pt ascends/descends 5 steps with B rails in reciprocating pattern.     Treatments:  Therapeutic Exercise (48744): 35 minutes  Standing:   Squats with B UE support 2x10 reps, cues to prevent dynamic valgus   Wall slides 2x10    Long sitting:   Ankle DF with red TB 3x10 reps      Current HEP:   Supine quad set + SLR   Standing hip ABD   Bridges- legs on bolster   LAQ with 3# ankle weight or TB  Resisted HS curls with blue TB    Mini squats   Lateral & AP/staggered weight shifting  Side stepping with countertop  Access Code: [email or web address]     Home Care HEP:   --Sit to stands with NWBing R LE  --Seated marching   --LAQs  --SLS on R LE    Gait Training (31429): 10 minutes  Pt ambulates with SPC in R hand with MIN A at gait belt and HHA of therapist on pt's L side. [id number] & 1x60ft.  Pt demo's compensated trendelenburg.     Goals:  By discharge, pt will meet the following goals:      Pt will demonstrate independence with home exercise program.  Pt will tolerate increased exercise without adverse reaction.   Pt will increase ROM of L ankle PF by 10  "deg.   Pt will increase ROM of L ankle DF to 10 deg.    Pt will increase strength of L LE to at least 4+/5.   Pt will improve score of LEFS by 9 points to meet MCID.  Pt will ambulate MOD I with LRAD with minimal gait deviations.   Pt will ascend/descend FOS without limitation.   Pt will meet self-reported goal of: \"To be able to walk on my left foot without a cane.\"  "

## 2024-09-13 ENCOUNTER — TREATMENT (OUTPATIENT)
Dept: PHYSICAL THERAPY | Facility: CLINIC | Age: 79
End: 2024-09-13
Payer: COMMERCIAL

## 2024-09-13 DIAGNOSIS — M25.472 EDEMA OF LEFT ANKLE: ICD-10-CM

## 2024-09-13 DIAGNOSIS — R26.2 DIFFICULTY WALKING: Primary | ICD-10-CM

## 2024-09-13 DIAGNOSIS — M25.672 ANKLE STIFFNESS, LEFT: ICD-10-CM

## 2024-09-13 PROCEDURE — 97110 THERAPEUTIC EXERCISES: CPT | Mod: GP

## 2024-09-13 PROCEDURE — 97116 GAIT TRAINING THERAPY: CPT | Mod: GP

## 2024-09-13 ASSESSMENT — PAIN - FUNCTIONAL ASSESSMENT: PAIN_FUNCTIONAL_ASSESSMENT: 0-10

## 2024-09-13 ASSESSMENT — PAIN SCALES - GENERAL: PAINLEVEL_OUTOF10: 0 - NO PAIN

## 2024-09-19 NOTE — PROGRESS NOTES
Physical Therapy    Physical Therapy Treatment    Patient Name: Zena Baxter  MRN: [id number]  Today's Date: 9/20/2024    Time Entry:   Time Calculation  Start Time: 1120  Stop Time: 1200  Time Calculation (min): 40 min     PT Therapeutic Procedures Time Entry  Therapeutic Exercise Time Entry: 30  Gait Training Time Entry: 10                 Visit #7 of 10   2024  90 DAY 11/1/2024 $1111.99 USED     Assessment:   Pt tolerated session well without adverse effect and is making progress toward goals. Pt fatigues with quadriceps strengthening, however was able to use a good resistance level today. Pt ambulates with improved ease with SPC in L hand and is safer for pt to perform this way vs cane in R hand. Pt will continue to benefit from PT to address her L LE strength and ROM to return to PLOF.     Plan:   Continue walking with SPC.   As needed:  Clamshells  S/L hip ABD?   Hurdles in // bars: anterior & lateral   Standing slow march in // bars  Tandem stance  Progress to walking with SPC     Current Problem  1. Ankle stiffness, left        2. Difficulty walking        3. Edema of left ankle          General   **Pt is s/p L ankle arthrodesis and achilles tendon lengthening on 6/11/24**  **She is 14.3 weeks post-op today**  Pt states she did not sleep well last night as her indoor cat got out and she had to wait until her cat came back to the door to get back in. Went to bed at 5am. Will be able to rest the next couple of days however.   No new falls.   Walked around [employer] this week and was very tired when she got home, as she had had a dental appt earlier in the day.        Subjective    Precautions  Precautions  STEADI Fall Risk Score (The score of 4 or more indicates an increased risk of falling): 9     Pain  Pain Assessment  Pain Assessment: 0-10  0-10 (Numeric) Pain Score: 0 - No pain    Objective   Pt ascends/descends 5 steps with B rails in reciprocating pattern.     Treatments:  Therapeutic Exercise (01255):  "30 minutes  Standing:   Wall slides 3x10  Squats with B UE support 2x10 reps, cues to prevent dynamic valgus--NOT TODAY    Cybex knee extension; seat at 2:   x10 with 10#   3x10 with 30#    Supine hip ABD against resistance of therapist x10 reps with 5 sec holds B     Standing gastroc stretch 2x30 sec holds B     NOT TODAY:  Long sitting:   Ankle DF with red TB 3x10 reps      Current HEP:   Supine quad set + SLR   Standing hip ABD   Bridges- legs on bolster   LAQ with 3# ankle weight or TB  Resisted HS curls with blue TB    Mini squats   Lateral & AP/staggered weight shifting  Side stepping with countertop  Access Code: [email or web address]     Home Care HEP:   --Sit to stands with NWBing R LE  --Seated marching   --LAQs  --SLS on R LE    Gait Training (86405): 10 minutes  Pt ambulates with SPC in L hand with CGA at gait belt 2x915oz & 1x60ft.  Pt demo's compensated trendelenburg.     Goals:  By discharge, pt will meet the following goals:      Pt will demonstrate independence with home exercise program.  Pt will tolerate increased exercise without adverse reaction.   Pt will increase ROM of L ankle PF by 10 deg.   Pt will increase ROM of L ankle DF to 10 deg.    Pt will increase strength of L LE to at least 4+/5.   Pt will improve score of LEFS by 9 points to meet MCID.  Pt will ambulate MOD I with LRAD with minimal gait deviations.   Pt will ascend/descend FOS without limitation.   Pt will meet self-reported goal of: \"To be able to walk on my left foot without a cane.\"  "

## 2024-09-20 ENCOUNTER — TREATMENT (OUTPATIENT)
Dept: PHYSICAL THERAPY | Facility: CLINIC | Age: 79
End: 2024-09-20
Payer: MEDICARE

## 2024-09-20 DIAGNOSIS — M25.472 EDEMA OF LEFT ANKLE: ICD-10-CM

## 2024-09-20 DIAGNOSIS — M25.672 ANKLE STIFFNESS, LEFT: Primary | ICD-10-CM

## 2024-09-20 DIAGNOSIS — R26.2 DIFFICULTY WALKING: ICD-10-CM

## 2024-09-20 PROCEDURE — 97116 GAIT TRAINING THERAPY: CPT | Mod: GP

## 2024-09-20 PROCEDURE — 97110 THERAPEUTIC EXERCISES: CPT | Mod: GP

## 2024-09-20 ASSESSMENT — PAIN SCALES - GENERAL: PAINLEVEL_OUTOF10: 0 - NO PAIN

## 2024-09-20 ASSESSMENT — PAIN - FUNCTIONAL ASSESSMENT: PAIN_FUNCTIONAL_ASSESSMENT: 0-10

## 2024-10-09 NOTE — PROGRESS NOTES
Physical Therapy    Physical Therapy Treatment    Patient Name: Zena Baxter  MRN: [id number]  Today's Date: 10/9/2024    Time Entry:                           Visit #8 of 10   2024  90 DAY 11/1/2024 $1111.99 USED     Assessment:   Pt tolerated session well without adverse effect and is making progress toward goals.     Pt fatigues with quadriceps strengthening, however was able to use a good resistance level today. Pt ambulates with improved ease with SPC in L hand and is safer for pt to perform this way vs cane in R hand. Pt will continue to benefit from PT to address her L LE strength and ROM to return to PLOF.     Plan:   Continue walking with SPC.   As needed:  Clamshells  S/L hip ABD?   Hurdles in // bars: anterior & lateral   Standing slow march in // bars  Tandem stance  Progress to walking with SPC     Current Problem  No diagnosis found.    General   **Pt is s/p L ankle arthrodesis and achilles tendon lengthening on 6/11/24**  **She is 17.2 weeks post-op today**  Pt states   Primarily walking with cane ***  Any follow ups with surgeon? ***  Hip ABD strengthening ***     she did not sleep well last night as her indoor cat got out and she had to wait until her cat came back to the door to get back in. Went to bed at 5am. Will be able to rest the next couple of days however.   No new falls.   Walked around [employer] this week and was very tired when she got home, as she had had a dental appt earlier in the day.        Subjective    Precautions        Pain       Objective   Pt ascends/descends 5 steps with B rails in reciprocating pattern.     Treatments:  Therapeutic Exercise (46704): 30 minutes  Standing:   Wall slides 3x10  Squats with B UE support 2x10 reps, cues to prevent dynamic valgus--NOT TODAY    Cybex knee extension; seat at 2:   x10 with 10#   3x10 with 30#    Supine hip ABD against resistance of therapist x10 reps with 5 sec holds B     Standing gastroc stretch 2x30 sec holds B     NOT  "TODAY:  Long sitting:   Ankle DF with red TB 3x10 reps      Current HEP:   Supine quad set + SLR   Standing hip ABD   Bridges- legs on bolster   LAQ with 3# ankle weight or TB  Resisted HS curls with blue TB    Mini squats   Lateral & AP/staggered weight shifting  Side stepping with countertop  Access Code: [email or web address]     Home Care HEP:   --Sit to stands with NWBing R LE  --Seated marching   --LAQs  --SLS on R LE    Gait Training (40263): 10 minutes  Pt ambulates with SPC in L hand with CGA at gait belt [id number] & 1x60ft.  Pt demo's compensated trendelenburg.     Goals:  By discharge, pt will meet the following goals:      Pt will demonstrate independence with home exercise program.  Pt will tolerate increased exercise without adverse reaction.   Pt will increase ROM of L ankle PF by 10 deg.   Pt will increase ROM of L ankle DF to 10 deg.    Pt will increase strength of L LE to at least 4+/5.   Pt will improve score of LEFS by 9 points to meet MCID.  Pt will ambulate MOD I with LRAD with minimal gait deviations.   Pt will ascend/descend FOS without limitation.   Pt will meet self-reported goal of: \"To be able to walk on my left foot without a cane.\"  "

## 2024-10-10 ENCOUNTER — APPOINTMENT (OUTPATIENT)
Dept: PHYSICAL THERAPY | Facility: CLINIC | Age: 79
End: 2024-10-10
Payer: COMMERCIAL

## 2024-10-16 NOTE — PROGRESS NOTES
Physical Therapy    Physical Therapy Treatment    Patient Name: Zena Baxter  MRN: [id number]  Today's Date: 10/16/2024    Time Entry:                           Visit #8 of 10   2024  90 DAY 11/1/2024 $1111.99 USED     Assessment:   Pt tolerated session well without adverse effect and is making progress toward goals.     Pt fatigues with quadriceps strengthening, however was able to use a good resistance level today. Pt ambulates with improved ease with SPC in L hand and is safer for pt to perform this way vs cane in R hand. Pt will continue to benefit from PT to address her L LE strength and ROM to return to PLOF.     Plan:   Continue walking with SPC.   As needed:  Clamshells  S/L hip ABD?   Hurdles in // bars: anterior & lateral   Standing slow march in // bars  Tandem stance  Progress to walking with SPC     Current Problem  No diagnosis found.    General   **Pt is s/p L ankle arthrodesis and achilles tendon lengthening on 6/11/24**  **She is 17.2 weeks post-op today**  Pt states   Primarily walking with cane ***  Any follow ups with surgeon? ***  Hip ABD strengthening ***     she did not sleep well last night as her indoor cat got out and she had to wait until her cat came back to the door to get back in. Went to bed at 5am. Will be able to rest the next couple of days however.   No new falls.   Walked around [employer] this week and was very tired when she got home, as she had had a dental appt earlier in the day.        Subjective    Precautions        Pain       Objective   Pt ascends/descends 5 steps with B rails in reciprocating pattern.     Treatments:  Therapeutic Exercise (13116): 30 minutes  Standing:   Wall slides 3x10  Squats with B UE support 2x10 reps, cues to prevent dynamic valgus--NOT TODAY    Cybex knee extension; seat at 2:   x10 with 10#   3x10 with 30#    Supine hip ABD against resistance of therapist x10 reps with 5 sec holds B     Standing gastroc stretch 2x30 sec holds B     NOT  "TODAY:  Long sitting:   Ankle DF with red TB 3x10 reps      Current HEP:   Supine quad set + SLR   Standing hip ABD   Bridges- legs on bolster   LAQ with 3# ankle weight or TB  Resisted HS curls with blue TB    Mini squats   Lateral & AP/staggered weight shifting  Side stepping with countertop  Access Code: [email or web address]     Home Care HEP:   --Sit to stands with NWBing R LE  --Seated marching   --LAQs  --SLS on R LE    Gait Training (80596): 10 minutes  Pt ambulates with SPC in L hand with CGA at gait belt [id number] & 1x60ft.  Pt demo's compensated trendelenburg.     Goals:  By discharge, pt will meet the following goals:      Pt will demonstrate independence with home exercise program.  Pt will tolerate increased exercise without adverse reaction.   Pt will increase ROM of L ankle PF by 10 deg.   Pt will increase ROM of L ankle DF to 10 deg.    Pt will increase strength of L LE to at least 4+/5.   Pt will improve score of LEFS by 9 points to meet MCID.  Pt will ambulate MOD I with LRAD with minimal gait deviations.   Pt will ascend/descend FOS without limitation.   Pt will meet self-reported goal of: \"To be able to walk on my left foot without a cane.\"  "

## 2024-10-17 ENCOUNTER — APPOINTMENT (OUTPATIENT)
Dept: PHYSICAL THERAPY | Facility: CLINIC | Age: 79
End: 2024-10-17
Payer: COMMERCIAL

## 2024-10-22 NOTE — PROGRESS NOTES
Physical Therapy    Physical Therapy Treatment    Patient Name: Zena Baxter  MRN: [id number]  Today's Date: 10/22/2024    Time Entry:                           Visit #8 of 10   2024  90 DAY 11/1/2024 $1111.99 USED     Assessment:   Pt tolerated session well without adverse effect and is making progress toward goals.     Pt fatigues with quadriceps strengthening, however was able to use a good resistance level today. Pt ambulates with improved ease with SPC in L hand and is safer for pt to perform this way vs cane in R hand. Pt will continue to benefit from PT to address her L LE strength and ROM to return to PLOF.     Plan:   Continue walking with SPC.   As needed:  Clamshells  S/L hip ABD?   Hurdles in // bars: anterior & lateral   Standing slow march in // bars  Tandem stance  Progress to walking with SPC     Current Problem  No diagnosis found.    General   **Pt is s/p L ankle arthrodesis and achilles tendon lengthening on 6/11/24**  **She is 17.2 weeks post-op today**  Pt states   Primarily walking with cane ***  Any follow ups with surgeon? ***  Hip ABD strengthening ***     she did not sleep well last night as her indoor cat got out and she had to wait until her cat came back to the door to get back in. Went to bed at 5am. Will be able to rest the next couple of days however.   No new falls.   Walked around [employer] this week and was very tired when she got home, as she had had a dental appt earlier in the day.        Subjective    Precautions        Pain       Objective   Pt ascends/descends 5 steps with B rails in reciprocating pattern.     Treatments:  Therapeutic Exercise (43066): 30 minutes  Standing:   Wall slides 3x10  Squats with B UE support 2x10 reps, cues to prevent dynamic valgus--NOT TODAY    Cybex knee extension; seat at 2:   x10 with 10#   3x10 with 30#    Supine hip ABD against resistance of therapist x10 reps with 5 sec holds B     Standing gastroc stretch 2x30 sec holds B     NOT  "TODAY:  Long sitting:   Ankle DF with red TB 3x10 reps      Current HEP:   Supine quad set + SLR   Standing hip ABD   Bridges- legs on bolster   LAQ with 3# ankle weight or TB  Resisted HS curls with blue TB    Mini squats   Lateral & AP/staggered weight shifting  Side stepping with countertop  Access Code: [email or web address]     Home Care HEP:   --Sit to stands with NWBing R LE  --Seated marching   --LAQs  --SLS on R LE    Gait Training (31660): 10 minutes  Pt ambulates with SPC in L hand with CGA at gait belt [id number] & 1x60ft.  Pt demo's compensated trendelenburg.     Goals:  By discharge, pt will meet the following goals:      Pt will demonstrate independence with home exercise program.  Pt will tolerate increased exercise without adverse reaction.   Pt will increase ROM of L ankle PF by 10 deg.   Pt will increase ROM of L ankle DF to 10 deg.    Pt will increase strength of L LE to at least 4+/5.   Pt will improve score of LEFS by 9 points to meet MCID.  Pt will ambulate MOD I with LRAD with minimal gait deviations.   Pt will ascend/descend FOS without limitation.   Pt will meet self-reported goal of: \"To be able to walk on my left foot without a cane.\"  "

## 2024-10-24 ENCOUNTER — APPOINTMENT (OUTPATIENT)
Dept: PHYSICAL THERAPY | Facility: CLINIC | Age: 79
End: 2024-10-24
Payer: MEDICARE

## 2024-10-31 NOTE — PROGRESS NOTES
"Physical Therapy    Physical Therapy Progress Note    Patient Name: Zena Baxter  MRN: [id number]  Today's Date: 10/31/2024    Time Entry:                           Visit #8 RE-ASSESSMENT   2024  90 DAY 11/1/2024 $1111.99 USED     Assessment:   Pt has met     Goals:  By discharge, pt will meet the following goals:      Pt will demonstrate independence with home exercise program.  Pt will tolerate increased exercise without adverse reaction.   Pt will increase ROM of L ankle PF by 10 deg.   Pt will increase ROM of L ankle DF to 10 deg.    Pt will increase strength of L LE to at least 4+/5.   Pt will improve score of LEFS by 9 points to meet MCID.  Pt will ambulate MOD I with LRAD with minimal gait deviations.   Pt will ascend/descend FOS without limitation.   Pt will meet self-reported goal of: \"To be able to walk on my left foot without a cane.\"    Plan:   Continue walking with SPC.   As needed:    Current Problem  No diagnosis found.    General   **Pt is s/p L ankle arthrodesis and achilles tendon lengthening on 6/11/24**  **She is 12.3 weeks post-op today**  Pt states   Primarily walking with cane ***  Any follow ups with surgeon? ***  Hip ABD strengthening ***     she did not sleep well last night as her indoor cat got out and she had to wait until her cat came back to the door to get back in. Went to bed at 5am. Will be able to rest the next couple of days however.   No new falls.   [place] [employer] this [place] and was very tired when she got home, as she had had a dental appt earlier in the day.        Subjective    Precautions        Pain       Objective   Pt ascends/descends 5 steps with B rails in reciprocating pattern.     Posture:   Fwd head, rounded shoulders, flexed posturing with FWW. Arrives at session with CAM boot.      Palpation:   Tender over catrina-incisional area     Transfers:  Sit <> stand: MOD I  Supine <> Sit: MOD I     Gait:   Pt ambulated with FWW with step to gait pattern. Cues provided " to assist with maintaining 50% Wbing restriction.      Hip Musculoskeletal Exam     Range of Motion    Right      Passive flexion: 120.       Passive internal rotation: 30.       Passive external rotation: 45.     Left      Passive flexion: 120.       Passive internal rotation: 30.       Passive external rotation: 45.      Strength    Right      Flexion: 4+/5.     Left      Flexion: 5/5.       Knee Musculoskeletal Exam     Range of Motion    Range of motion additional comments: Knee ROM WFL B.     Strength    Right      Extension: 5/5.       Flexion: 4+/5.     Left      Extension: 4+/5.       Flexion: 4/5.      Foot/Ankle Musculoskeletal Exam     Inspection    Left      Edema: moderate        Previous foot incision: forefoot and hindfoot      Previous ankle incision: anterior, lateral and posterior      Incision: clean, dry and intact        Incisional drainage: none     Range of Motion    Right      Active Dorsiflexion: 0      Passive Dorsiflexion: 5      Active Plantar Flexion: 25      Passive Inversion: 25      Passive Eversion: 15    Left      Active Dorsiflexion: 0      Passive dorsiflexion: 4.      Active Plantar Flexion: 10      Passive Plantar Flexion: 10     Strength    Right      Tibialis anterior: 4-/5.       Gastroc/soleus: 5/5.       Tibialis posterior: 4-/5.       Peroneals: 4-/5.     Left      Tibialis anterior: 3/5.     Strength additional comments: Held MMT of L ankle/foot, however able to move into fair test positioning where listed      Outcome Measures:  Other Measures  Lower Extremity Funtional Score (LEFS): 35     Treatments:  Therapeutic Exercise (38520): 30 minutes  Standing:   Wall slides 3x10  Squats with B UE support 2x10 reps, cues to prevent dynamic valgus--NOT TODAY    Cybex knee extension; seat at 2:   x10 with 10#   3x10 with 30#    Supine hip ABD against resistance of therapist x10 reps with 5 sec holds B     Standing gastroc stretch 2x30 sec holds B     NOT TODAY:  Long sitting:    Ankle DF with red TB 3x10 reps      Current HEP:   Supine quad set + SLR   Standing hip ABD   Bridges- legs on bolster   LAQ with 3# ankle weight or TB  Resisted HS curls with blue TB    Mini squats   Lateral & AP/staggered weight shifting  Side stepping with countertop  Access Code: [email or web address]     Home Care HEP:   --Sit to stands with NWBing R LE  --Seated marching   --LAQs  --SLS on R LE    ***  Clamshells  S/L hip ABD?   Hurdles in // bars: anterior & lateral   Standing slow march in // bars  Tandem stance  Progress to walking with SPC     Gait Training (30735): 10 minutes  Pt ambulates with SPC in L hand with CGA at gait belt [id number] & 1x60ft.  Pt demo's compensated trendelenburg.

## 2024-11-01 ENCOUNTER — APPOINTMENT (OUTPATIENT)
Dept: PHYSICAL THERAPY | Facility: CLINIC | Age: 79
End: 2024-11-01
Payer: COMMERCIAL

## 2024-11-12 NOTE — PROGRESS NOTES
"Physical Therapy    Physical Therapy Progress Note    Patient Name: Zena Baxter  MRN: [id number]  Today's Date: 11/13/2024    Time Entry:   Time Calculation  Start Time: 1604  Stop Time: 1645  Time Calculation (min): 41 min     PT Therapeutic Procedures Time Entry  Therapeutic Exercise Time Entry: 41                 Visit #8 RE-ASSESSMENT   2024  90 DAY 11/1/2024 $1111.99 USED     Assessment:   Pt has met 1 of 9 goals and has made progress on remaining goals. Pt is now ambulating with SPC MOD I, however demonstrates excessive Trendelenburg to compensated Trendelenburg pattern. Her ankle DF AROM is now 10 deg or greater. Her L LE strength is significantly improved, aside from likely weakness of hip abductors. LEFS score improved by 7 points from initial evaluation. Pt is able to ascend/descend stairs MOD I with B rails, however with unilateral rail requires CGA to SBA at 2 steps. Pt will continue to benefit from PT working on progressed goals established as below at 1-2x/week frequency for up to 10 more visits.     Goals:  By discharge, pt will meet the following goals:      Pt will demonstrate independence with home exercise program.--MOSTLY MET  Pt will tolerate increased exercise without adverse reaction.--MOSTLY MET  Pt will increase ROM of L ankle PF by 10 deg.--PARTIALLY MET   Pt will increase ROM of L ankle DF to 10 deg.--GOAL MET   Pt will increase strength of L LE to at least 4+/5.--MOSTLY MET   Pt will improve score of LEFS by 9 points to meet MCID.--MOSTLY MET  Pt will ambulate MOD I with LRAD with minimal gait deviations.--MOSTLY MET  Pt will ascend/descend FOS without limitation.--PARTIALLY MET    Pt will meet self-reported goal of: \"To be able to walk on my left foot without a cane.\"--PARTIALLY MET    By discharge, pt will meet the following new goals established on 11/13/24:      Pt will increase walking duration in order to complete 6MWT.   Pt will ambulate 350ft independently with minimal gait " "deviations.   Pt will improve strength of B hip ABD to at least 4/5.   Pt will ascend/descend FOS MOD I without limitation.     Plan:   Continue PT at 1-2x/week for up to 10 more visits, working on new goals established above.     Current Problem  1. Ankle stiffness, left        2. Difficulty walking        3. Edema of left ankle          General   **Pt is s/p L ankle arthrodesis and achilles tendon lengthening on 6/11/24**  **She is 22.1 weeks post-op today**  Pt states she was started on a blood thinner and diuretic since last visit, ordered through cardiology. Reports these are making her feel \"swimmy\" as she feels this was lowering her BP. Had more lab work done and was told she was anemic and was told she may also have diastolic heart failure. Is to have an appt with PCP tomorrow. Has a virtual appt with cardiology next week.   Arrives at session with SPC today. Has been using more at home. Will continue to use FWW when feeling \"off\" due to BP concerns. Did have a controlled lowering to the ground because of this.   Also feels may not have been getting enough sleep.   Has noticed fatigue with walking. Generally feeling more fatigued.   Would like to be able to walk longer distances, and eventually get rid of the cane.   Has been walking short distances in the kitchen without a cane.   Reports her current standing activity tolerance is limited by feeling SOB.   Next follow up with surgeon on 12/11/24.      Subjective    Precautions  Precautions  STEADI Fall Risk Score (The score of 4 or more indicates an increased risk of falling): 9     Pain  Pain Assessment  Pain Assessment: 0-10  0-10 (Numeric) Pain Score: 0 - No pain    Objective   Posture:   Fwd head, rounded shoulders, flexed posturing with FWW. Arrives at session with CAM boot.      Transfers:  Sit <> stand: MOD I  Supine <> Sit: MOD I     Gait:   Pt ambulated with FWW with step to gait pattern. Cues provided to assist with maintaining 50% Wbing " restriction.   CURRENT PATIENT AMBULATES MOD I WITH SPC, DEMONSTRATING COMPENSATED TRENDELENBURG PATTERN TO TRENDELENBURG PATTERN.     Stairs:   CURRENT patient ascends/descends 2 steps with unilateral rail with SBA to supervision.     Hip Musculoskeletal Exam     Range of Motion    Right      Passive flexion: 120.       Passive internal rotation: 30.       Passive external rotation: 45.     Left      Passive flexion: 120.       Passive internal rotation: 30.       Passive external rotation: 45.      Strength    Right      Flexion: 4+/5.     Left      Flexion: 5/5.       Knee Musculoskeletal Exam     Range of Motion    Range of motion additional comments: Knee ROM WFL B.     Strength    Right      Extension: 5/5.       Flexion: 4+/5.     Left      Extension: 4+/5.       Flexion: 4/5.      Foot/Ankle Musculoskeletal Exam     Inspection    Left      Edema: moderate        Previous foot incision: forefoot and hindfoot      Previous ankle incision: anterior, lateral and posterior      Incision: clean, dry and intact        Incisional drainage: none     Range of Motion    Right      Active Dorsiflexion: 0      Passive Dorsiflexion: 5      Active Plantar Flexion: 25      Passive Inversion: 25      Passive Eversion: 15    Left      Active Dorsiflexion: 0; CURRENT 10 DF       Passive dorsiflexion: 4.      Active Plantar Flexion: 10      Passive Plantar Flexion: 10     Strength    Right      Tibialis anterior: 4-/5.       Gastroc/soleus: 5/5.       Tibialis posterior: 4-/5.       Peroneals: 4-/5.     Left      Tibialis anterior: 3/5.; CURRENT 4+/5    Strength additional comments: Held MMT of L ankle/foot, however able to move into fair test positioning where listed      Outcome Measures:  Other Measures  Lower Extremity Funtional Score (LEFS): 35; CURRENT 42/80    Treatments:  Therapeutic Exercise (33670): 41 minutes  Objective measures for re-assessment, as above.      Current HEP:   Supine quad set + SLR   Standing hip ABD    Bridges- legs on bolster   LAQ with 3# ankle weight or TB  Resisted HS curls with blue TB    Mini squats   Lateral & AP/staggered weight shifting  Side stepping with countertop  Access Code: [email or web address]     Home Care HEP:   --Sit to stands with NWBing R LE  --Seated marching   --LAQs  --SLS on R LE

## 2024-11-13 ENCOUNTER — APPOINTMENT (OUTPATIENT)
Dept: PHYSICAL THERAPY | Facility: CLINIC | Age: 79
End: 2024-11-13
Payer: COMMERCIAL

## 2024-11-13 DIAGNOSIS — R26.2 DIFFICULTY WALKING: ICD-10-CM

## 2024-11-13 DIAGNOSIS — M25.672 ANKLE STIFFNESS, LEFT: Primary | ICD-10-CM

## 2024-11-13 DIAGNOSIS — M25.472 EDEMA OF LEFT ANKLE: ICD-10-CM

## 2024-11-13 PROCEDURE — 97110 THERAPEUTIC EXERCISES: CPT | Mod: GP,KX

## 2024-11-13 ASSESSMENT — PAIN - FUNCTIONAL ASSESSMENT: PAIN_FUNCTIONAL_ASSESSMENT: 0-10

## 2024-11-13 ASSESSMENT — PAIN SCALES - GENERAL: PAINLEVEL_OUTOF10: 0 - NO PAIN

## 2024-11-22 ENCOUNTER — APPOINTMENT (OUTPATIENT)
Dept: PHYSICAL THERAPY | Facility: CLINIC | Age: 79
End: 2024-11-22
Payer: MEDICARE

## 2024-11-22 DIAGNOSIS — M25.672 ANKLE STIFFNESS, LEFT: Primary | ICD-10-CM

## 2024-11-22 DIAGNOSIS — M25.472 EDEMA OF LEFT ANKLE: ICD-10-CM

## 2024-11-22 DIAGNOSIS — R26.2 DIFFICULTY WALKING: ICD-10-CM

## 2024-11-22 PROCEDURE — 97110 THERAPEUTIC EXERCISES: CPT | Mod: GP,KX,CQ

## 2024-11-22 NOTE — PROGRESS NOTES
"Physical Therapy    Physical Therapy Treatment    Patient Name: Zena Baxter  MRN: [id number]  Today's Date: 11/22/2024    Time Entry:   Time Calculation  Start Time: 1123  Stop Time: 1207  Time Calculation (min): 44 min     PT Therapeutic Procedures Time Entry  Therapeutic Exercise Time Entry: 44                 Visit #9 ( 1 of 10 new)  2024  90 DAY 11/1/2024 $1111.99 USED     Assessment: Pt. Tolerated session well today.  Spent session today working on strengthening exercises mostly for her hips.  Will add in more walking and endurance type activity as able.  Pt. Will con't to benefit from P.T. working on endurance and lower extremity strengthening.     Goals:  By discharge, pt will meet the following goals:      Pt will demonstrate independence with home exercise program.--MOSTLY MET  Pt will tolerate increased exercise without adverse reaction.--MOSTLY MET  Pt will increase ROM of L ankle PF by 10 deg.--PARTIALLY MET   Pt will increase ROM of L ankle DF to 10 deg.--GOAL MET   Pt will increase strength of L LE to at least 4+/5.--MOSTLY MET   Pt will improve score of LEFS by 9 points to meet MCID.--MOSTLY MET  Pt will ambulate MOD I with LRAD with minimal gait deviations.--MOSTLY MET  Pt will ascend/descend FOS without limitation.--PARTIALLY MET    Pt will meet self-reported goal of: \"To be able to walk on my left foot without a cane.\"--PARTIALLY MET    By discharge, pt will meet the following new goals established on 11/13/24:      Pt will increase walking duration in order to complete 6MWT.   Pt will ambulate 350ft independently with minimal gait deviations.   Pt will improve strength of B hip ABD to at least 4/5.   Pt will ascend/descend FOS MOD I without limitation.     Plan:   Continue PT POC.    Current Problem  1. Ankle stiffness, left        2. Difficulty walking        3. Edema of left ankle          General   **Pt is s/p L ankle arthrodesis and achilles tendon lengthening on 6/11/24**  **She is 23.3 " weeks post-op today**    Pt. States she feels like her BP is low and she feels a little lightheaded when she is walking.  Did go up a flight of stairs in her home without difficulty and felt good about doing it.    BP taken and it was 128/60     Subjective    Precautions   Precautions  STEADI Fall Risk Score (The score of 4 or more indicates an increased risk of falling): 9         Treatments:  Therapeutic Exercise (75366): 44 minutes    Standing hip abduction x 10 reps, repeated with 2# ankle weights x 10 reps bilat.  Standing hip flexion 2# 2 x 10 reps  Seated laq's 2# 2 x 10 reps bilat  Supine bridge with legs over bolster 2 x 10 reps  Hooklying hip abduction with blue t-band x 10 reps  Supine with t-band around the ankles hip abduction x 10 reps bilat.       Current HEP:   Supine quad set + SLR   Standing hip ABD   Bridges- legs on bolster   LAQ with 3# ankle weight or TB  Resisted HS curls with blue TB    Mini squats   Lateral & AP/staggered weight shifting  Side stepping with countertop  Access Code: [email or web address]     Home Care HEP:   --Sit to stands with NWBing R LE  --Seated marching   --LAQs  --SLS on R LE

## 2024-11-27 ENCOUNTER — APPOINTMENT (OUTPATIENT)
Dept: PHYSICAL THERAPY | Facility: CLINIC | Age: 79
End: 2024-11-27
Payer: MEDICARE

## 2024-11-27 DIAGNOSIS — M25.472 EDEMA OF LEFT ANKLE: ICD-10-CM

## 2024-11-27 DIAGNOSIS — M25.672 ANKLE STIFFNESS, LEFT: Primary | ICD-10-CM

## 2024-11-27 DIAGNOSIS — R26.2 DIFFICULTY WALKING: ICD-10-CM

## 2024-11-27 PROCEDURE — 97110 THERAPEUTIC EXERCISES: CPT | Mod: GP,KX,CQ

## 2024-11-27 NOTE — PROGRESS NOTES
"Physical Therapy    Physical Therapy Treatment    Patient Name: Zena Baxter  MRN: [id number]  Today's Date: 11/27/2024    Time Entry:   Time Calculation  Start Time: 1523  Stop Time: 1605  Time Calculation (min): 42 min     PT Therapeutic Procedures Time Entry  Therapeutic Exercise Time Entry: 42                 Visit #10 ( 2 of 10 new)  2024  90 DAY 11/1/2024 $1111.99 USED     Assessment: Pt. Tolerated session well and is making some progress towards goals.  She was able to walk for 2 minutes before she was fatigued and needed to sit.  Pt. Used the SPC.  She does report walking around [employer] yesterday using the cart, with fatigue afterwards.  Worked on lower extremity strengthening especially the hips.  Pt. Reported fatigue at end of session.   Pt. Will con't to benefit from P.T. working on endurance and lower extremity strengthening.     Goals:  By discharge, pt will meet the following goals:      Pt will demonstrate independence with home exercise program.--MOSTLY MET  Pt will tolerate increased exercise without adverse reaction.--MOSTLY MET  Pt will increase ROM of L ankle PF by 10 deg.--PARTIALLY MET   Pt will increase ROM of L ankle DF to 10 deg.--GOAL MET   Pt will increase strength of L LE to at least 4+/5.--MOSTLY MET   Pt will improve score of LEFS by 9 points to meet MCID.--MOSTLY MET  Pt will ambulate MOD I with LRAD with minimal gait deviations.--MOSTLY MET  Pt will ascend/descend FOS without limitation.--PARTIALLY MET    Pt will meet self-reported goal of: \"To be able to walk on my left foot without a cane.\"--PARTIALLY MET    By discharge, pt will meet the following new goals established on 11/13/24:      Pt will increase walking duration in order to complete 6MWT.   Pt will ambulate 350ft independently with minimal gait deviations.   Pt will improve strength of B hip ABD to at least 4/5.   Pt will ascend/descend FOS MOD I without limitation.     Plan:   Continue PT POC.    Current Problem  1. " "Ankle stiffness, left        2. Difficulty walking        3. Edema of left ankle          General   **Pt is s/p L ankle arthrodesis and achilles tendon lengthening on 6/11/24**  **She is 24.1 weeks post-op today**    Pt. States didn't sleep well last night as her stomach was bothering her.  Denies lightheadedness today.  Pt. Stated feeling a little stiff after last session.  Pt. Reports a \"Weird funky feeling\" in left heel which is nothing new.  Will see podiatrist on the 11th.  Subjective    Precautions   Precautions  STEADI Fall Risk Score (The score of 4 or more indicates an increased risk of falling): 9         Treatments:  Therapeutic Exercise (99960): 42 minutes  -Pt. Ambulated with SPC for 2 minutes, rested and then walked another 30 seconds.  Reported fatigue at the 2 minute subhash.   -side stepping along the // bar with UE support about 12 reps.  -alternate toe taps to 6 inch step x 10 reps, repeated with 2# ankle weights  -Standing hip abduction x 10 reps with 2# ankle weights.  -Seated laq's 2# 2 x 10 reps bilat  -lateral stepping over a vini x 15 reps bilat.     Current HEP:   Supine quad set + SLR   Standing hip ABD   Bridges- legs on bolster   LAQ with 3# ankle weight or TB  Resisted HS curls with blue TB    Mini squats   Lateral & AP/staggered weight shifting  Side stepping with countertop  Access Code: [email or web address]     Home Care HEP:   --Sit to stands with NWBing R LE  --Seated marching   --LAQs  --SLS on R LE  "

## 2024-12-02 ENCOUNTER — APPOINTMENT (OUTPATIENT)
Dept: PHYSICAL THERAPY | Facility: CLINIC | Age: 79
End: 2024-12-02
Payer: MEDICARE

## 2024-12-02 DIAGNOSIS — R26.2 DIFFICULTY WALKING: ICD-10-CM

## 2024-12-02 DIAGNOSIS — M25.672 ANKLE STIFFNESS, LEFT: Primary | ICD-10-CM

## 2024-12-02 DIAGNOSIS — M25.472 EDEMA OF LEFT ANKLE: ICD-10-CM

## 2024-12-02 PROCEDURE — 97110 THERAPEUTIC EXERCISES: CPT | Mod: GP,KX

## 2024-12-02 ASSESSMENT — PAIN - FUNCTIONAL ASSESSMENT: PAIN_FUNCTIONAL_ASSESSMENT: 0-10

## 2024-12-02 ASSESSMENT — PAIN SCALES - GENERAL: PAINLEVEL_OUTOF10: 0 - NO PAIN

## 2024-12-08 NOTE — PROGRESS NOTES
"Physical Therapy    Physical Therapy Treatment    Patient Name: Zena Baxter  MRN: [id number]  Today's Date: 12/8/2024    Time Entry:                           Visit #12 (4 of 10 new)  2024  90 DAY 2/11/2025     Assessment:   Pt tolerated session well without adverse effect and is making some progress toward goals.     Pt appropriately challenged with hip ABD strengthening focus in session today. Pt will continue to benefit from PT to address her LE strength and functional mobility.       Plan:   Review modified side planks   Review bird dogs  As able:     Current Problem  1. Ankle stiffness, left        2. Difficulty walking        3. Edema of left ankle          General   Pt states   Review modified side planks & bird dogs ***    she is tired today due to various household activities. Pt is to see her surgeon 12/11/24. Requesting to hold from progressive walking distances this date due to fatigue.        Subjective    Precautions     Vital Signs     Pain       Objective     Treatments:  Therapeutic Exercise (54982): 45 minutes  Modified side plank lift x10 reps with 5 sec holds     Bird dogs: with focus on LE movement, no UE lifting x5 reps with longer hold     Standing slowing marching 2x10 reps, cues for upright positioning and slow movement      ***  Progressive walking distances- performed to increase walking stamina (SPC vs without device)   Harnessed: walking with 2\" sticks between feet  Cable column walkouts  Stepping over sticks  Stepping over hurdles   Ascending/descending stairs for exercise     Current HEP:   Supine quad set + SLR   Standing hip ABD   Bridges- legs on bolster   LAQ with 3# ankle weight or TB  Resisted HS curls with blue TB    Mini squats   Lateral & AP/staggered weight shifting  Side stepping with countertop  Access Code: [email or web address]     Goals:  By discharge, pt will meet the following new goals established on 11/13/24:      Pt will increase walking duration in order to complete 6MWT. "   Pt will ambulate 350ft independently with minimal gait deviations.   Pt will improve strength of B hip ABD to at least 4/5.   Pt will ascend/descend FOS MOD I without limitation.

## 2024-12-09 ENCOUNTER — APPOINTMENT (OUTPATIENT)
Dept: PHYSICAL THERAPY | Facility: CLINIC | Age: 79
End: 2024-12-09
Payer: MEDICARE

## 2024-12-09 DIAGNOSIS — M25.672 ANKLE STIFFNESS, LEFT: Primary | ICD-10-CM

## 2024-12-09 DIAGNOSIS — R26.2 DIFFICULTY WALKING: ICD-10-CM

## 2024-12-09 DIAGNOSIS — M25.472 EDEMA OF LEFT ANKLE: ICD-10-CM

## 2024-12-09 NOTE — PROGRESS NOTES
"Physical Therapy    Physical Therapy Treatment    Patient Name: Zena Baxter  MRN: [id number]  Today's Date: 12/10/2024    Time Entry:   Time Calculation  Start Time: 1422  Stop Time: 1500  Time Calculation (min): 38 min     PT Therapeutic Procedures Time Entry  Neuromuscular Re-Education Time Entry: 33  Therapeutic Exercise Time Entry: 5                 Visit #12 (4 of 10 new)  2024  90 DAY 2/11/2025     Assessment:   Pt tolerated session well without adverse effect and is making some progress toward goals. Pt adequately challenged and fatigued with standing strengthening while harnessed today. Performed both with & without SPC while harnessed walking with 2\" stick between feet & when stepping over 2\" sticks. Pt will continue to benefit from PT to address her LE strength and functional mobility.       Plan:   Review modified side planks   Review bird dogs  As able:   Progressive walking distances- performed to increase walking stamina (SPC vs without device)   Cable column walkouts  Stepping over sticks  Stepping over hurdles   Ascending/descending stairs for exercise    Current Problem  1. Ankle stiffness, left        2. Difficulty walking        3. Edema of left ankle          General   Pt states she slid to the ground yesterday. This occurred when reaching for something however could not stop her self and continued to lose balance. Is to see her surgeon tomorrow. Has been working on the modified side planks & bird dogs.        Subjective    Precautions  Precautions  STEADI Fall Risk Score (The score of 4 or more indicates an increased risk of falling): 9  Vital Signs     Pain  Pain Assessment  Pain Assessment: 0-10  0-10 (Numeric) Pain Score: 0 - No pain    Objective     Treatments:  Therapeutic Exercise (93665): 5 minutes  Verbally reviewed:   Modified side plank lift x10 reps with 5 sec holds   Bird dogs: with focus on LE movement, no UE lifting x5 reps with longer hold     NOT TODAY:   Standing slowing " "marching 2x10 reps, cues for upright positioning and slow movement     Current HEP:   Supine quad set + SLR   Standing hip ABD   Bridges- legs on bolster   LAQ with 3# ankle weight or TB  Resisted HS curls with blue TB    Mini squats   Lateral & AP/staggered weight shifting  Side stepping with countertop  Access Code: [email or web address]     Neuromuscular Re-education (67327): 33 minutes  Harnessed:   --walking with 2\" sticks between feet x6 min  --stepping fwd over 2\" sticks x4 min, greatly fatigued by end of session    Goals:  By discharge, pt will meet the following new goals established on 11/13/24:      Pt will increase walking duration in order to complete 6MWT.   Pt will ambulate 350ft independently with minimal gait deviations.   Pt will improve strength of B hip ABD to at least 4/5.   Pt will ascend/descend FOS MOD I without limitation.   "

## 2024-12-10 ENCOUNTER — TREATMENT (OUTPATIENT)
Dept: PHYSICAL THERAPY | Facility: CLINIC | Age: 79
End: 2024-12-10
Payer: MEDICARE

## 2024-12-10 DIAGNOSIS — M25.472 EDEMA OF LEFT ANKLE: ICD-10-CM

## 2024-12-10 DIAGNOSIS — M25.672 ANKLE STIFFNESS, LEFT: Primary | ICD-10-CM

## 2024-12-10 DIAGNOSIS — R26.2 DIFFICULTY WALKING: ICD-10-CM

## 2024-12-10 PROCEDURE — 97112 NEUROMUSCULAR REEDUCATION: CPT | Mod: GP,KX

## 2024-12-10 PROCEDURE — 97110 THERAPEUTIC EXERCISES: CPT | Mod: GP,KX

## 2024-12-10 ASSESSMENT — PAIN - FUNCTIONAL ASSESSMENT: PAIN_FUNCTIONAL_ASSESSMENT: 0-10

## 2024-12-10 ASSESSMENT — PAIN SCALES - GENERAL: PAINLEVEL_OUTOF10: 0 - NO PAIN

## 2024-12-16 ENCOUNTER — APPOINTMENT (OUTPATIENT)
Dept: PHYSICAL THERAPY | Facility: CLINIC | Age: 79
End: 2024-12-16
Payer: MEDICARE

## 2024-12-16 DIAGNOSIS — M25.672 ANKLE STIFFNESS, LEFT: Primary | ICD-10-CM

## 2024-12-16 DIAGNOSIS — M25.472 EDEMA OF LEFT ANKLE: ICD-10-CM

## 2024-12-16 DIAGNOSIS — R26.2 DIFFICULTY WALKING: ICD-10-CM

## 2024-12-16 PROCEDURE — 97112 NEUROMUSCULAR REEDUCATION: CPT | Mod: GP,KX,CQ

## 2024-12-16 NOTE — PROGRESS NOTES
"Physical Therapy    Physical Therapy Treatment    Patient Name: Zena Baxter  MRN: [id number]  Today's Date: 12/16/2024    Time Entry:   Time Calculation  Start Time: 1556  Stop Time: 1632  Time Calculation (min): 36 min     PT Therapeutic Procedures Time Entry  Neuromuscular Re-Education Time Entry: 36                 Visit #13 (5 of 10 new)  2024  90 DAY 2/11/2025     Assessment:   Pt tolerated session well without adverse effect and is making some progress toward goals.  Pt. Did well stepping over the sticks, she did use her SPC and other times she used one hand on the // bar.  Added 2# ankle weights with stepping over sticks with cues to slow down at times.  Pt will continue to benefit from PT to address her LE strength and functional mobility.       Plan:   Review modified side planks   Review bird dogs  As able:   Progressive walking distances- performed to increase walking stamina (SPC vs without device)   Cable column walkouts  Stepping over sticks  Stepping over hurdles   Ascending/descending stairs for exercise    Current Problem  1. Ankle stiffness, left        2. Difficulty walking        3. Edema of left ankle          General   Pt states feeling good today and feels like she is getting stronger.     Subjective    Precautions   Precautions  STEADI Fall Risk Score (The score of 4 or more indicates an increased risk of falling): 9  Vital Signs     Pain       Objective     Treatments:  Neuromuscular Re-education (43144): 36 minutes  -walking with 2\"inch sticks between feet within // bars using her SPC multiple reps, good technique with longer steps and heel strike.   -walking and stepping over sticks going forward using a reciprocating gait pattern multiple reps with one UE support, added 2# ankle weights and repeated the same.  -lateral stepping over sticks wearing 2# ankle weights with focus on more upright posture.  Multiple reps.  -alternate toe taps to 6 inch step wearing 2# ankle weights 2 x 10 " reps  -retro walking within // bars multiple reps.     Current HEP:   Supine quad set + SLR   Standing hip ABD   Bridges- legs on bolster   LAQ with 3# ankle weight or TB  Resisted HS curls with blue TB    Mini squats   Lateral & AP/staggered weight shifting  Side stepping with countertop  Access Code: [email or web address]       Goals:  By discharge, pt will meet the following new goals established on 11/13/24:      Pt will increase walking duration in order to complete 6MWT.   Pt will ambulate 350ft independently with minimal gait deviations.   Pt will improve strength of B hip ABD to at least 4/5.   Pt will ascend/descend FOS MOD I without limitation.

## 2024-12-22 NOTE — PROGRESS NOTES
"Physical Therapy    Physical Therapy Treatment    Patient Name: Zena Baxter  MRN: [id number]  Today's Date: 12/23/2024    Time Entry:   Time Calculation  Start Time: 1155  Stop Time: 1237  Time Calculation (min): 42 min     PT Therapeutic Procedures Time Entry  Neuromuscular Re-Education Time Entry: 10  Therapeutic Exercise Time Entry: 32                 Visit #14 (5 of 10 new)  2024  90 DAY 2/11/2025     Assessment:   Pt tolerated session well without adverse effect and is making slow progress toward goals. Pt reports another fall since last visit without injury due to turning. This is likely associated with cross over step when turning due to weak hip musculature. Focused on this throughout session. Session somewhat limited secondary to late arrival of pt. Pt will continue to benefit from PT to address her LE strength and functional mobility.     Plan:   Review modified side planks   Review bird dogs  As able:   Progressive walking distances- performed to increase walking stamina (SPC vs without device)   Cable column walkouts  Stepping over sticks  Stepping over hurdles   Ascending/descending stairs for exercise    Current Problem  1. Ankle stiffness, left        2. Difficulty walking        3. Edema of left ankle          General   Pt states she \"gracefully slid to the floor\" since last visit. This occurred 3 days ago after turning too quickly. Her son was there. She was able to crawl to a chair and pull herself up to standing. Continues to report her R hip feels weak- which she feels she has been dealing with for a long time and attributes to \"walking wrong\". Denies injury. Reports her R hip aches a little bit at start of session.  Reports she has been off of her routine with her HEP the last few days.        Subjective    Precautions  Precautions  STEADI Fall Risk Score (The score of 4 or more indicates an increased risk of falling): 9  Vital Signs     Pain  Pain Assessment  Pain Assessment: 0-10  0-10 " "(Numeric) Pain Score: 3  Pain Location: Hip  Pain Orientation: Right  Pain Descriptors: Aching    Objective     Treatments:  Therapeutic Exercise (93974): 32 minutes  Hooklying hip ABD isometric with belt x10 reps with 10 sec holds    Modified side plank lift x8 reps with 5 sec holds   Bird dogs: with focus on LE movement, no UE lifting x5 reps with longer hold     Hip ABD isometric at wall 4x30 sec holds     Current HEP:   Supine quad set + SLR   Standing hip ABD   Bridges- legs on bolster   LAQ with 3# ankle weight or TB  Resisted HS curls with blue TB    Mini squats   Lateral & AP/staggered weight shifting  Side stepping with countertop  Access Code: [email or web address]     Neuromuscular Re-education (74873): 10 minutes  Walking with bungee loop on floor between feet to work on keeping feet apart when turning. X3-4 laps each direction.     NOT TODAY:   Harnessed:   --walking with 2\" sticks between feet x6 min  --stepping fwd over 2\" sticks x4 min, greatly fatigued by end of session    Goals:  By discharge, pt will meet the following new goals established on 11/13/24:      Pt will increase walking duration in order to complete 6MWT.   Pt will ambulate 350ft independently with minimal gait deviations.   Pt will improve strength of B hip ABD to at least 4/5.   Pt will ascend/descend FOS MOD I without limitation.   "

## 2024-12-23 ENCOUNTER — APPOINTMENT (OUTPATIENT)
Dept: PHYSICAL THERAPY | Facility: CLINIC | Age: 79
End: 2024-12-23
Payer: MEDICARE

## 2024-12-23 DIAGNOSIS — M25.672 ANKLE STIFFNESS, LEFT: Primary | ICD-10-CM

## 2024-12-23 DIAGNOSIS — M25.472 EDEMA OF LEFT ANKLE: ICD-10-CM

## 2024-12-23 DIAGNOSIS — R26.2 DIFFICULTY WALKING: ICD-10-CM

## 2024-12-23 PROCEDURE — 97112 NEUROMUSCULAR REEDUCATION: CPT | Mod: GP,KX

## 2024-12-23 PROCEDURE — 97110 THERAPEUTIC EXERCISES: CPT | Mod: GP,KX

## 2024-12-23 ASSESSMENT — PAIN SCALES - GENERAL: PAINLEVEL_OUTOF10: 3

## 2024-12-23 ASSESSMENT — PAIN DESCRIPTION - DESCRIPTORS: DESCRIPTORS: ACHING

## 2024-12-23 ASSESSMENT — PAIN - FUNCTIONAL ASSESSMENT: PAIN_FUNCTIONAL_ASSESSMENT: 0-10

## 2024-12-29 NOTE — PROGRESS NOTES
"Physical Therapy    Physical Therapy Treatment    Patient Name: Zena Baxter  MRN: [id number]  Today's Date: 12/30/2024    Time Entry:   Time Calculation  Start Time: 1150  Stop Time: 1230  Time Calculation (min): 40 min     PT Therapeutic Procedures Time Entry  Neuromuscular Re-Education Time Entry: 40                 Visit #15 (6 of 10 new)  2024  90 DAY 2/11/2025     Assessment:   Pt tolerated session well without adverse effect and is making slow progress toward goals. Session somewhat limited secondary to pt greatly fatigued today. Requires 1 prolonged seated rest break during session. Greatly challenged with stepping over 2\" sticks without device. Pt will continue to benefit from PT to address her LE strength and functional mobility.     Plan:   As able:   Progressive walking distances- performed to increase walking stamina (SPC vs without device)   Cable column walkouts  Stepping over sticks  Stepping over hurdles   Ascending/descending stairs for exercise    Current Problem  1. Ankle stiffness, left        2. Difficulty walking        3. Edema of left ankle          General   Pt states \"I am so tired.\" Over the holiday she had family staying with her. Due to having family over, she did not get a chance to perform her HEP.        Subjective    Precautions  Precautions  STEADI Fall Risk Score (The score of 4 or more indicates an increased risk of falling): 9  Vital Signs     Pain  Pain Assessment  Pain Assessment: 0-10  0-10 (Numeric) Pain Score: 0 - No pain    Objective     Treatments:   Current HEP:   Supine quad set + SLR   Standing hip ABD   Bridges- legs on bolster   LAQ with 3# ankle weight or TB  Resisted HS curls with blue TB    Mini squats   Lateral & AP/staggered weight shifting  Side stepping with countertop  Access Code: [email or web address]     Neuromuscular Re-education (97988): 40 minutes  Harnessed:   --Walking with sticks between feet to increase JOE. Performing quick turns at end of each harness " length, first with SPC then without device. Multiple laps of each   --Stepping over sticks: fwd & lateral first with SPC then without device. X4 laps each  --Stepping over hurdles     NOT TODAY:   Walking with bungee loop on floor between feet to work on keeping feet apart when turning. X3-4 laps each direction.     Goals:  By discharge, pt will meet the following new goals established on 11/13/24:      Pt will increase walking duration in order to complete 6MWT.   Pt will ambulate 350ft independently with minimal gait deviations.   Pt will improve strength of B hip ABD to at least 4/5.   Pt will ascend/descend FOS MOD I without limitation.

## 2024-12-30 ENCOUNTER — APPOINTMENT (OUTPATIENT)
Dept: PHYSICAL THERAPY | Facility: CLINIC | Age: 79
End: 2024-12-30
Payer: MEDICARE

## 2024-12-30 DIAGNOSIS — M25.672 ANKLE STIFFNESS, LEFT: Primary | ICD-10-CM

## 2024-12-30 DIAGNOSIS — M25.472 EDEMA OF LEFT ANKLE: ICD-10-CM

## 2024-12-30 DIAGNOSIS — R26.2 DIFFICULTY WALKING: ICD-10-CM

## 2024-12-30 PROCEDURE — 97112 NEUROMUSCULAR REEDUCATION: CPT | Mod: GP,KX

## 2024-12-30 ASSESSMENT — PAIN SCALES - GENERAL: PAINLEVEL_OUTOF10: 0 - NO PAIN

## 2024-12-30 ASSESSMENT — PAIN - FUNCTIONAL ASSESSMENT: PAIN_FUNCTIONAL_ASSESSMENT: 0-10

## 2025-01-05 NOTE — PROGRESS NOTES
"Physical Therapy    Physical Therapy Treatment    Patient Name: Zena Baxter  MRN: [id number]  Today's Date: 1/5/2025    Time Entry:                           Visit #16 (7 of 10 new)  2024  90 DAY 2/11/2025     Assessment:   Pt tolerated session well without adverse effect and is making slow progress toward goals.     Session somewhat limited secondary to pt greatly fatigued today. Requires 1 prolonged seated rest break during session. Greatly challenged with stepping over 2\" sticks without device. Pt will continue to benefit from PT to address her LE strength and functional mobility.     Plan:   As able:         Current Problem  1. Ankle stiffness, left        2. Difficulty walking        3. Edema of left ankle          General   Pt states   More well rested now? ***   Cable column walkouts ***   Stepping over sticks ***   Walking with sticks between feet ***     \"I am so tired.\" Over the holiday she had family staying with her. Due to having family over, she did not get a chance to perform her HEP.        Subjective    Precautions     Vital Signs     Pain       Objective     Treatments:  Therapeutic Exercise (42752): *** minutes  ***   Progressive walking distances- performed to increase walking stamina (SPC vs without device)   Ascending/descending stairs for exercise     Current HEP:   Supine quad set + SLR   Standing hip ABD   Bridges- legs on bolster   LAQ with 3# ankle weight or TB  Resisted HS curls with blue TB    Mini squats   Lateral & AP/staggered weight shifting  Side stepping with countertop  Access Code: [email or web address]     Neuromuscular Re-education (34717): 40 minutes  Harnessed:   --Walking with sticks between feet to increase JOE. Performing quick turns at end of each harness length, first with SPC then without device. Multiple laps of each   --Stepping over sticks: fwd & lateral first with SPC then without device. X4 laps each  --Stepping over hurdles     NOT TODAY:   Walking with bungee loop on " floor between feet to work on keeping feet apart when turning. X3-4 laps each direction.     ***  Cable column walkouts  Stepping over sticks  Stepping over hurdles       Goals:  By discharge, pt will meet the following new goals established on 11/13/24:      Pt will increase walking duration in order to complete 6MWT.   Pt will ambulate 350ft independently with minimal gait deviations.   Pt will improve strength of B hip ABD to at least 4/5.   Pt will ascend/descend FOS MOD I without limitation.    Laura Maldonado MD

## 2025-01-06 ENCOUNTER — APPOINTMENT (OUTPATIENT)
Dept: PHYSICAL THERAPY | Facility: CLINIC | Age: 80
End: 2025-01-06
Payer: MEDICARE

## 2025-01-06 DIAGNOSIS — M25.472 EDEMA OF LEFT ANKLE: ICD-10-CM

## 2025-01-06 DIAGNOSIS — R26.2 DIFFICULTY WALKING: ICD-10-CM

## 2025-01-06 DIAGNOSIS — M25.672 ANKLE STIFFNESS, LEFT: Primary | ICD-10-CM

## 2025-01-13 ENCOUNTER — APPOINTMENT (OUTPATIENT)
Dept: PHYSICAL THERAPY | Facility: CLINIC | Age: 80
End: 2025-01-13
Payer: MEDICARE

## 2025-01-13 DIAGNOSIS — R26.2 DIFFICULTY WALKING: ICD-10-CM

## 2025-01-13 DIAGNOSIS — M25.672 ANKLE STIFFNESS, LEFT: Primary | ICD-10-CM

## 2025-01-13 DIAGNOSIS — M25.472 EDEMA OF LEFT ANKLE: ICD-10-CM

## 2025-01-13 PROCEDURE — 97112 NEUROMUSCULAR REEDUCATION: CPT | Mod: GP

## 2025-01-13 ASSESSMENT — PAIN - FUNCTIONAL ASSESSMENT: PAIN_FUNCTIONAL_ASSESSMENT: 0-10

## 2025-01-13 ASSESSMENT — PAIN SCALES - GENERAL: PAINLEVEL_OUTOF10: 0 - NO PAIN

## 2025-01-13 NOTE — PROGRESS NOTES
Physical Therapy    Physical Therapy Treatment    Patient Name: Zena Baxter  MRN: [id number]  Today's Date: 1/13/2025    Time Entry:   Time Calculation  Start Time: 1514  Stop Time: 1600  Time Calculation (min): 46 min     PT Therapeutic Procedures Time Entry  Neuromuscular Re-Education Time Entry: 46                 Visit #16 (7 of 10 new)  2024  90 DAY 2/11/2025   1st visit in 2025    Assessment:   Pt tolerated session well without adverse effect and is making slow progress toward goals. Pt fatigues rapidly during session. Requires at least 2 seated rest breaks during session. Did have 2 instances in which she caught her R toes when walking with MOD LOB. Pt will continue to benefit from PT to address her LE strength and functional mobility.     Plan:   Continue with similar session.  Re-assessment in 3 visits.  As able:   Stepping over hurdles     Current Problem  1. Ankle stiffness, left        2. Difficulty walking        3. Edema of left ankle          General   Pt states she was moving furniture including book cases, etc. Going through objects and getting rid of objects. Was fatigued. Did find a box of sentimental objects which impacted her ability to sleep as she was tearful.        Subjective    Precautions  Precautions  STEADI Fall Risk Score (The score of 4 or more indicates an increased risk of falling): 9  Vital Signs     Pain  Pain Assessment  Pain Assessment: 0-10  0-10 (Numeric) Pain Score: 0 - No pain    Objective     Treatments:   Current HEP:   Supine quad set + SLR   Standing hip ABD   Bridges- legs on bolster   LAQ with 3# ankle weight or TB  Resisted HS curls with blue TB    Mini squats   Lateral & AP/staggered weight shifting  Side stepping with countertop  Access Code: [email or web address]     Neuromuscular Re-education (15143): 46 minutes  Harnessed:   --Walking with sticks between feet to increase JOE. Performing quick turns at end of each harness length with bungee cord to encourage tighter turns  and to prevent cross over stepping, first with SPC then without device. Multiple laps of each   --Stepping over sticks: fwd with SPC then without device. X4 laps each    Cable column walkouts with 5 then 7.5# resistance x10 reps todal    NOT TODAY:   Walking with bungee loop on floor between feet to work on keeping feet apart when turning. X3-4 laps each direction.     Goals:  By discharge, pt will meet the following new goals established on 11/13/24:      Pt will increase walking duration in order to complete 6MWT.   Pt will ambulate 350ft independently with minimal gait deviations.   Pt will improve strength of B hip ABD to at least 4/5.   Pt will ascend/descend FOS MOD I without limitation.

## 2025-01-20 ENCOUNTER — APPOINTMENT (OUTPATIENT)
Dept: PHYSICAL THERAPY | Facility: CLINIC | Age: 80
End: 2025-01-20
Payer: MEDICARE

## 2025-01-20 DIAGNOSIS — M25.672 ANKLE STIFFNESS, LEFT: Primary | ICD-10-CM

## 2025-01-20 DIAGNOSIS — R26.2 DIFFICULTY WALKING: ICD-10-CM

## 2025-01-20 DIAGNOSIS — M25.472 EDEMA OF LEFT ANKLE: ICD-10-CM

## 2025-01-27 ENCOUNTER — APPOINTMENT (OUTPATIENT)
Dept: PHYSICAL THERAPY | Facility: CLINIC | Age: 80
End: 2025-01-27
Payer: MEDICARE

## 2025-01-27 DIAGNOSIS — M25.472 EDEMA OF LEFT ANKLE: ICD-10-CM

## 2025-01-27 DIAGNOSIS — M25.672 ANKLE STIFFNESS, LEFT: Primary | ICD-10-CM

## 2025-01-27 DIAGNOSIS — R26.2 DIFFICULTY WALKING: ICD-10-CM

## 2025-01-27 PROCEDURE — 97112 NEUROMUSCULAR REEDUCATION: CPT | Mod: GP,CQ

## 2025-01-27 NOTE — PROGRESS NOTES
Physical Therapy    Physical Therapy Treatment    Patient Name: Zena Baxter  MRN: [id number]  Today's Date: 1/27/2025    Time Entry:   Time Calculation  Start Time: 1435  Stop Time: 1515  Time Calculation (min): 40 min     PT Therapeutic Procedures Time Entry  Neuromuscular Re-Education Time Entry: 40                 Visit #17 (8 of 10 new)  2024  90 DAY 2/11/2025   1st visit in 2025    Assessment:   Pt tolerated session well without adverse effect and is making slow progress toward goals.  Stepping over sticks laterally  challenging with increased cues for technique.  Pt. has reported two falls since last visit, pt. Thinks she is turning too fast.  Pt will continue to benefit from PT to address her LE strength and functional mobility.     Plan:   Continue with similar session.  Re-assessment in 2 visits.  As able:   Stepping over hurdles     Current Problem  1. Ankle stiffness, left        2. Difficulty walking        3. Edema of left ankle          General   Pt  states she fell by turning really fast and ended up sliding down the wall, right hip was sore but it's improved.  Felt like hip was bruised.  Pt.scooted into the office and was able to pull herself up from the desk chair.    Pt. Also reports she fell the week before on her left knee,  she thinks  it was from turning    Pt. Has been doing the stairs but not every day.    Has been working on slowing down too and focusing on her walking.   Has been able to walk straight now since she had surgery.    Subjective    Precautions   Precautions  STEADI Fall Risk Score (The score of 4 or more indicates an increased risk of falling): 9    Objective     Treatments:   Current HEP:   Supine quad set + SLR   Standing hip ABD   Bridges- legs on bolster   LAQ with 3# ankle weight or TB  Resisted HS curls with blue TB    Mini squats   Lateral & AP/staggered weight shifting  Side stepping with countertop  Access Code: [email or web address]     Neuromuscular Re-education (35839): 40  minutes  --Walking with sticks between feet to increase JOE.  Using SPC.   --Stepping over sticks: fwd with SPC x 4 laps  --lateral stepping over sticks using cane with verbal cues for posture and technique x 2 laps  --walking within // bars forward without UE support and backward with one UE support. Multiple reps.  Cable column walkouts with 5 then 7.5# resistance x10 reps total.  NOT TODAY    NOT TODAY:   Walking with bungee loop on floor between feet to work on keeping feet apart when turning. X3-4 laps each direction.     Goals:  By discharge, pt will meet the following new goals established on 11/13/24:      Pt will increase walking duration in order to complete 6MWT.   Pt will ambulate 350ft independently with minimal gait deviations.   Pt will improve strength of B hip ABD to at least 4/5.   Pt will ascend/descend FOS MOD I without limitation.

## 2025-02-02 NOTE — PROGRESS NOTES
"Physical Therapy    Physical Therapy Treatment    Patient Name: Zena Baxter  MRN: [id number]  Today's Date: 2/3/2025    Time Entry:   Time Calculation  Start Time: 1422  Stop Time: 1503  Time Calculation (min): 41 min     PT Therapeutic Procedures Time Entry  Neuromuscular Re-Education Time Entry: 26  Therapeutic Exercise Time Entry: 15                 Visit #18 (9 of 10 new)  2024  90 DAY 2/11/2025   3rd visit in 2025    Assessment:   Pt tolerated session well without adverse effect and is making slow progress toward goals. First portion of session spent discussing appropriate balance exercises to complete in various positions- emphasizing need for solid external support with standing exercises. Worked on tighter turns with SPC as well as stepping over bungees in // bars. Performs without major LOB. Pt will continue to benefit from PT to address her LE strength and functional mobility.     Plan:   Re-assessment next visit.     As able:   Stepping over hurdles   Harnessed stepping strategy  Harnessed faster turns in place    Current Problem  1. Ankle stiffness, left        2. Difficulty walking        3. Edema of left ankle          General   Pt states she had a \"stellar\" day yesterday. Did have a difficult time sleeping last night, therefore is tired today. Did most of her walking around the house yesterday without a device and focused on slowing down. Reports last visit in PT did well with bwd walking.   Saw some exercises for balance that she may try, which include: seated exercises, strengthening, lying down. (Encouraged pt to perform seated and supine strengthening and to only perform standing balance work when holding onto a support surface for now.)   Also reports noticing ways to get up from the ground too. (Discussed with pt importance of use of external support to assist with standing, however pt is not required to perform floor <> stand transfers at home).    No new falls since last visit.   Continues " to feel that she struggles with corrections toward end limits of stability.  Does feel she is capable of walking further distances before fatigue. Has been able to ascend/descend FOS with rail and cane and has been able to do pretty well.        Subjective    Precautions  Precautions  STEADI Fall Risk Score (The score of 4 or more indicates an increased risk of falling): 9  Vital Signs     Pain  Pain Assessment  Pain Assessment: 0-10  0-10 (Numeric) Pain Score: 0 - No pain    Objective     Treatments:  Therapeutic Exercise (16689): 15 minutes  Discussed with pt various exercises and provided guidance of appropriate exercises in sitting, supine and standing.      Current HEP:   Supine quad set + SLR   Standing hip ABD   Bridges- legs on bolster   LAQ with 3# ankle weight or TB  Resisted HS curls with blue TB    Mini squats   Lateral & AP/staggered weight shifting  Side stepping with countertop  Access Code: [email or web address]     Neuromuscular Re-education (01055): 26 minutes  Weaving around cones:   --Pt practiced weaving around large cones with SPC- use of bungee cords for specific foot placement. Multiple reps     In //bars:   Stepping fwd over bungees with B to unilateral to infrequent without UE support  Occasionally pt requests to perform going bwd    NOT TODAY:   Harnessed:   --Walking with sticks between feet to increase JOE. Performing quick turns at end of each harness length with bungee cord to encourage tighter turns and to prevent cross over stepping, first with SPC then without device. Multiple laps of each   --Stepping over sticks: fwd with SPC then without device. X4 laps each    NOT TODAY:  Cable column walkouts with 5 then 7.5# resistance x10 reps total    NOT TODAY:   Walking with bungee loop on floor between feet to work on keeping feet apart when turning. X3-4 laps each direction.     Goals:  By discharge, pt will meet the following new goals established on 11/13/24:      Pt will increase walking duration  in order to complete 6MWT.   Pt will ambulate 350ft independently with minimal gait deviations.   Pt will improve strength of B hip ABD to at least 4/5.   Pt will ascend/descend FOS MOD I without limitation.

## 2025-02-03 ENCOUNTER — APPOINTMENT (OUTPATIENT)
Dept: PHYSICAL THERAPY | Facility: CLINIC | Age: 80
End: 2025-02-03
Payer: MEDICARE

## 2025-02-03 DIAGNOSIS — M25.472 EDEMA OF LEFT ANKLE: ICD-10-CM

## 2025-02-03 DIAGNOSIS — R26.2 DIFFICULTY WALKING: ICD-10-CM

## 2025-02-03 DIAGNOSIS — M25.672 ANKLE STIFFNESS, LEFT: Primary | ICD-10-CM

## 2025-02-03 PROCEDURE — 97110 THERAPEUTIC EXERCISES: CPT | Mod: GP

## 2025-02-03 PROCEDURE — 97112 NEUROMUSCULAR REEDUCATION: CPT | Mod: GP

## 2025-02-03 ASSESSMENT — PAIN SCALES - GENERAL: PAINLEVEL_OUTOF10: 0 - NO PAIN

## 2025-02-03 ASSESSMENT — PAIN - FUNCTIONAL ASSESSMENT: PAIN_FUNCTIONAL_ASSESSMENT: 0-10

## 2025-02-10 ENCOUNTER — APPOINTMENT (OUTPATIENT)
Dept: PHYSICAL THERAPY | Facility: CLINIC | Age: 80
End: 2025-02-10
Payer: MEDICARE

## 2025-02-10 DIAGNOSIS — R26.2 DIFFICULTY WALKING: ICD-10-CM

## 2025-02-10 DIAGNOSIS — M25.472 EDEMA OF LEFT ANKLE: ICD-10-CM

## 2025-02-10 DIAGNOSIS — M25.672 ANKLE STIFFNESS, LEFT: Primary | ICD-10-CM

## 2025-02-10 PROCEDURE — 97110 THERAPEUTIC EXERCISES: CPT | Mod: GP

## 2025-02-10 ASSESSMENT — PAIN - FUNCTIONAL ASSESSMENT: PAIN_FUNCTIONAL_ASSESSMENT: 0-10

## 2025-02-10 ASSESSMENT — PAIN SCALES - GENERAL: PAINLEVEL_OUTOF10: 0 - NO PAIN

## 2025-02-10 NOTE — PROGRESS NOTES
Physical Therapy    Physical Therapy Progress Note    Patient Name: Zena Baxter  MRN: [id number]  Today's Date: 2/10/2025    Time Entry:   Time Calculation  Start Time: 1421  Stop Time: 1504  Time Calculation (min): 43 min     PT Therapeutic Procedures Time Entry  Therapeutic Exercise Time Entry: 43                 Visit #19 (10 of 10 new) RE-ASSESSMENT  2024  90 DAY 2/11/2025   4th visit in 2025    Assessment:   Pt has met 2 of 4 goals and has made little progress on remaining goals. Since last re-assessment, pt has transitioned to use of SPC most often. Her stamina has improved and has been able to ascend/descend a FOS. Pt continues to require hands on assistance when walking without a device. Her B hip abductors continue to remain weak. She is able to ascend/descend FOS MOD I. Pt will continue to benefit from PT, working on new goals as listed below.     Goals:  By discharge, pt will meet the following new goals established on 11/13/24:      Pt will increase walking duration in order to complete 6MWT.--GOAL MET  Pt will ambulate 350ft independently with minimal gait deviations.--NOT MET  Pt will improve strength of B hip ABD to at least 4/5.--NOT MET  Pt will ascend/descend FOS MOD I without limitation.--GOAL MET    By discharge, pt will meet the following new goals established on 2/10/25:     Pt will perform DGI at or above cutoff for falls risk assessment.   Pt will ambulate 350ft independently with minimal gait deviations.  Pt will improve strength of B hip ABD to at least 4/5.    Plan:   Continue PT at 1-2x/week for 5-10 weeks weeks, working toward progressed goals as above.      As able:   Stepping over hurdles   Harnessed stepping strategy  Harnessed faster turns in place    Current Problem  1. Ankle stiffness, left        2. Difficulty walking        3. Edema of left ankle          General   Pt states she has been having a harder time staying focused and generally more emotional, which impacts ability  to participate in her HEP.   No new falls since last visit. Reports she is unsure she will realistically be able to walk without her cane safely.   Has noticed fluctuating edema of her LE. Did not wear compression garments over the weekend.        Subjective    Precautions  Precautions  STEADI Fall Risk Score (The score of 4 or more indicates an increased risk of falling): 9  Vital Signs     Pain  Pain Assessment  Pain Assessment: 0-10  0-10 (Numeric) Pain Score: 0 - No pain    Objective   MMT:   B hip ABD: 2/5 B     Other Measures  6 min Walk Test: 734ft (with SPC)  Activities - Specific Balance Confidence Scale: 1200 (75% of self confidence)  Lower Extremity Funtional Score (LEFS): 43    Treatments:  Therapeutic Exercise (78430): 43 minutes  Objective measures for re-assessment, as above.       Current HEP:   Supine quad set + SLR   Standing hip ABD   Bridges- legs on bolster   LAQ with 3# ankle weight or TB  Resisted HS curls with blue TB    Mini squats   Lateral & AP/staggered weight shifting  Side stepping with countertop  Access Code: [email or web address]

## 2025-02-10 NOTE — LETTER
February 10, 2025    [place]  [address] [address] [place] [address]    Patient: Zena Baxter   YOB: 1945   Date of Visit: 2/10/2025       Dear Uma Wisdom PA-C  [address] [place] [address]    The attached plan of care is being sent to you because your patient’s medical reimbursement requires that you certify the plan of care. Your signature is required to allow uninterrupted insurance coverage.      You may indicate your approval by signing below and faxing this form back to us at Dept Fax: [phone].    Please call Dept: [phone] with any questions or concerns.    Thank you for this referral,        Deisy Zazueta, PT  ST [address] [facility]  [address] [facility] [address]    Payer: Payor: MEDICARE / Plan: MEDICARE PART A AND B / Product Type: *No Product type* /                                                                         Date:     Dear Deisy Zazueta, PT,     Re: Ms. Zena Baxter, MRN:[id number]    I certify that I have reviewed the attached plan of care and it is medically necessary for Ms. Zena Baxter (1945) who is under my care.          ______________________________________                    _________________  Provider name and credentials                                           Date and time                                                                                           Plan of Care 2/10/25   Effective from: 2/10/2025  Effective to: 5/11/2025    Plan ID: [id number]            Participants as of Finalize on 2/10/2025    Name Type Comments Contact Info    Deisy Zazueta PT Physical Therapist  [phone]    James Can MD Consulting Physician Pt with Medicare insurance. Please sign to re-certify PT POC. [phone]       Last Plan Note     Author: Deisy Zazueta PT Status: Incomplete Last edited: 2/10/2025  2:15 PM       Physical Therapy    Physical Therapy Progress Note    Patient  Name: Zena Baxter  MRN: [id number]  Today's Date: 2/10/2025    Time Entry:   Time Calculation  Start Time: 1421                       Visit #19 (10 of 10 new) RE-ASSESSMENT  2024  90 DAY 2/11/2025   4th visit in 2025    Assessment:   Pt has met 2 of 4 goals and has made little progress on remaining goals. Since last re-assessment, pt has transitioned to use of SPC most often. Her stamina has improved and has been able to ascend/descend a FOS. Pt continues to require hands on assistance when walking without a device. Her B hip abductors continue to remain weak. She is able to ascend/descend FOS MOD I. Pt will continue to benefit from PT, working on new goals as listed below.     Goals:  By discharge, pt will meet the following new goals established on 11/13/24:      Pt will increase walking duration in order to complete 6MWT.--GOAL MET  Pt will ambulate 350ft independently with minimal gait deviations.--NOT MET  Pt will improve strength of B hip ABD to at least 4/5.--NOT MET  Pt will ascend/descend FOS MOD I without limitation.--GOAL MET    By discharge, pt will meet the following new goals established on 2/10/25:     Pt will perform DGI at or above cutoff for falls risk assessment.   Pt will ambulate 350ft independently with minimal gait deviations.  Pt will improve strength of B hip ABD to at least 4/5.    Plan:   Continue PT at 1-2x/week for 5-10 weeks weeks, working toward progressed goals as above.      As able:   Stepping over hurdles   Harnessed stepping strategy  Harnessed faster turns in place    Current Problem  1. Ankle stiffness, left        2. Difficulty walking        3. Edema of left ankle          General   Pt states she has been having a harder time staying focused and generally more emotional, which impacts ability to participate in her HEP.   No new falls since last visit. Reports she is unsure she will realistically be able to walk without her cane safely.   Has noticed fluctuating edema of  her LE. Did not wear compression garments over the weekend.        Subjective   Precautions  Precautions  STEADI Fall Risk Score (The score of 4 or more indicates an increased risk of falling): 9  Vital Signs     Pain  Pain Assessment  Pain Assessment: 0-10  0-10 (Numeric) Pain Score: 0 - No pain    Objective  MMT:   B hip ABD: 2/5 B     Other Measures  6 min Walk Test: 734ft (with SPC)  Activities - Specific Balance Confidence Scale: 1200 (75% of self confidence)  Lower Extremity Funtional Score (LEFS): 43    Treatments:  Therapeutic Exercise (60946): 15 minutes  Objective measures for re-assessment, as above.       Current HEP:   Supine quad set + SLR   Standing hip ABD   Bridges- legs on bolster   LAQ with 3# ankle weight or TB  Resisted HS curls with blue TB    Mini squats   Lateral & AP/staggered weight shifting  Side stepping with countertop  Access Code: [email or web address]              Current Participants as of 2/10/2025    Name Type Comments Contact Info    Deisy Zazueta, PT Physical Therapist  [phone]    Signature pending    James Can MD Consulting Physician Pt with Medicare insurance. Please sign to re-certify PT POC. [phone]    Signature pending

## 2025-02-16 NOTE — PROGRESS NOTES
Physical Therapy    Physical Therapy Treatment    Patient Name: Zena Baxter  MRN: [id number]  Today's Date: 2/16/2025    Time Entry:                           Visit #20 (1 of 10 new)   2024  90 DAY 5/11/2025   5th visit in 2025    Assessment:   Pt tolerated session well without adverse effect and is making progress toward goals.   Pt will continue to benefit from PT to address ***.     Plan:       As able:   Stepping over hurdles   Harnessed stepping strategy  Harnessed faster turns in place    Current Problem  1. Ankle stiffness, left        2. Difficulty walking        3. Edema of left ankle          General   Pt states   How to structure session: hip strengthening, DGI tasks & walking without device ***     she has been having a harder time staying focused and generally more emotional, which impacts ability to participate in her HEP.   No new falls since last visit. Reports she is unsure she will realistically be able to walk without her cane safely.   Has noticed fluctuating edema of her LE. Did not wear compression garments over the weekend.        Subjective    Precautions     Vital Signs     Pain       Objective        Treatments:  Therapeutic Exercise (60375): *** minutes  ***  Any hip strengthening exercises      Current HEP:   Supine quad set + SLR   Standing hip ABD   Bridges- legs on bolster   LAQ with 3# ankle weight or TB  Resisted HS curls with blue TB    Mini squats   Lateral & AP/staggered weight shifting  Side stepping with countertop  Access Code: [email or web address]     Neuromuscular Re-education (07125): *** minutes  Cable column walkouts ***     Harnessed: ***   Stepping over sticks fwd & lateral   Practice tighter turns   Hurdles ***   Stepping strategy ***     Dynamic: x4 laps each along // bar ***   High Stepping   Side stepping   Retrowalking  Tandem walk    Dynamic Gait: ***   Horizontal & vertical head turns  Fast/slow/stop/go  180 deg turns  Stepping over sticks    ***  Walking without a device            Goals:  By discharge, pt will meet the following new goals established on 2/10/25:     Pt will perform DGI at or above cutoff for falls risk assessment.   Pt will ambulate 350ft independently with minimal gait deviations.  Pt will improve strength of B hip ABD to at least 4/5.

## 2025-02-17 ENCOUNTER — APPOINTMENT (OUTPATIENT)
Dept: PHYSICAL THERAPY | Facility: CLINIC | Age: 80
End: 2025-02-17
Payer: MEDICARE

## 2025-02-17 DIAGNOSIS — M25.672 ANKLE STIFFNESS, LEFT: Primary | ICD-10-CM

## 2025-02-17 DIAGNOSIS — R26.2 DIFFICULTY WALKING: ICD-10-CM

## 2025-02-17 DIAGNOSIS — M25.472 EDEMA OF LEFT ANKLE: ICD-10-CM

## 2025-02-23 NOTE — PROGRESS NOTES
Physical Therapy    Physical Therapy Treatment    Patient Name: Zena Baxter  MRN: [id number]  Today's Date: 2/24/2025    Time Entry:   Time Calculation  Start Time: 1426  Stop Time: 1515  Time Calculation (min): 49 min     PT Therapeutic Procedures Time Entry  Neuromuscular Re-Education Time Entry: 49                 Visit #20 (1 of 10 new)   2024  90 DAY 5/11/2025   5th visit in 2025    Assessment:   Pt tolerated session well without adverse effect and is making some progress toward goals. Pt did have 2 LOB requiring MOD A of therapist, one of which occurred when standing and donning gait belt with anterior LOB. Pt adequately challenged with activities focused on widening JOE and dynamic gait tasks. Pt will continue to benefit from PT to address her functional mobility and balance.     Plan:   Hip strengthening, DGI tasks & walking without device.   As able:   Cable column walkouts   Harnessed:   Stepping over sticks fwd & lateral   Practice tighter turns   Hurdles   Stepping strategy   Balloon volley at slower speeds to challenge static balance with head/arm movement    Current Problem  1. Ankle stiffness, left        2. Difficulty walking        3. Edema of left ankle          General   Pt states no new falls from last visit. Reports she has not worked on her HEP as often as recommended. Mood was down over the last week and she ended up sitting more this past week. Reports she has been fatigued, but did sleep better last night.        Subjective    Precautions  Precautions  STEADI Fall Risk Score (The score of 4 or more indicates an increased risk of falling): 9  Vital Signs     Pain  Pain Assessment  Pain Assessment: 0-10  0-10 (Numeric) Pain Score: 0 - No pain    Objective        Treatments:  Current HEP:   Supine quad set + SLR   Standing hip ABD   Bridges- legs on bolster   LAQ with 3# ankle weight or TB  Resisted HS curls with blue TB    Mini squats   Lateral & AP/staggered weight shifting  Side stepping  with countertop  Access Code: [email or web address]     Neuromuscular Re-education (91982): 49 minutes  Walking without a device in 20ft hallway focusing on widening JOE x4 laps  Added bungee cords 2 thick to widen JOE and walked along this 2x6     Dynamic Gait: In 20ft hallway    Horizontal & vertical head turns  Fast/slow/stop/go  180 deg turns    Goals:  By discharge, pt will meet the following new goals established on 2/10/25:     Pt will perform DGI at or above cutoff for falls risk assessment.   Pt will ambulate 350ft independently with minimal gait deviations.  Pt will improve strength of B hip ABD to at least 4/5.

## 2025-02-24 ENCOUNTER — APPOINTMENT (OUTPATIENT)
Dept: PHYSICAL THERAPY | Facility: CLINIC | Age: 80
End: 2025-02-24
Payer: MEDICARE

## 2025-02-24 DIAGNOSIS — M25.472 EDEMA OF LEFT ANKLE: ICD-10-CM

## 2025-02-24 DIAGNOSIS — M25.672 ANKLE STIFFNESS, LEFT: Primary | ICD-10-CM

## 2025-02-24 DIAGNOSIS — R26.2 DIFFICULTY WALKING: ICD-10-CM

## 2025-02-24 PROCEDURE — 97112 NEUROMUSCULAR REEDUCATION: CPT | Mod: GP

## 2025-02-24 ASSESSMENT — PAIN - FUNCTIONAL ASSESSMENT: PAIN_FUNCTIONAL_ASSESSMENT: 0-10

## 2025-02-24 ASSESSMENT — PAIN SCALES - GENERAL: PAINLEVEL_OUTOF10: 0 - NO PAIN

## 2025-02-26 ENCOUNTER — APPOINTMENT (OUTPATIENT)
Dept: PHYSICAL THERAPY | Facility: CLINIC | Age: 80
End: 2025-02-26
Payer: MEDICARE

## 2025-02-26 DIAGNOSIS — M25.472 EDEMA OF LEFT ANKLE: ICD-10-CM

## 2025-02-26 DIAGNOSIS — R26.2 DIFFICULTY WALKING: ICD-10-CM

## 2025-02-26 DIAGNOSIS — M25.672 ANKLE STIFFNESS, LEFT: Primary | ICD-10-CM

## 2025-02-26 PROCEDURE — 97112 NEUROMUSCULAR REEDUCATION: CPT | Mod: GP,CQ

## 2025-02-26 NOTE — PROGRESS NOTES
Physical Therapy    Physical Therapy Treatment    Patient Name: Zena Baxter  MRN: [id number]  Today's Date: 2/26/2025    Time Entry:   Time Calculation  Start Time: 1303  Stop Time: 1345  Time Calculation (min): 42 min     PT Therapeutic Procedures Time Entry  Neuromuscular Re-Education Time Entry: 42                 Visit #21 (2 of 10 new)   2024  90 DAY 5/11/2025   5th visit in 2025    Assessment:   Pt tolerated session well without adverse effect and is making some progress toward goals. Pt adequately challenged with activities focused on widening JOE.   Side stepping  a little challenging with balance but she did well clearing the hurdles. Pt will continue to benefit from PT to address her functional mobility and balance.     Plan:   Hip strengthening, DGI tasks & walking without device.   As able:   Cable column walkouts   Harnessed:   Stepping over sticks fwd & lateral   Practice tighter turns   Hurdles   Stepping strategy   Balloon volley at slower speeds to challenge static balance with head/arm movement    Current Problem  1. Ankle stiffness, left        2. Difficulty walking        3. Edema of left ankle          General  Pt. Reports No falls since last visit.  Did get some more sleep last night.         Subjective    Precautions   Precautions  STEADI Fall Risk Score (The score of 4 or more indicates an increased risk of falling): 9       Objective    Neuromuscular Re-education (42236): 42 minutes    In harness system:  -Walking with SPC along tape line multiple reps.  -placed sticks down with two sticks next to each other  which was about 4 inches apart and practiced walking with even a wider base of support.  - forward walking over sticks using a reciprocating gait pattern multiple reps  -lateral stepping over sticks multiple reps which was a  little more challenging and working on hip abduction.       Treatments:  Current HEP:   Supine quad set + SLR   Standing hip ABD   Bridges- legs on bolster    LAQ with 3# ankle weight or TB  Resisted HS curls with blue TB    Mini squats   Lateral & AP/staggered weight shifting  Side stepping with countertop  Access Code: [email or web address]     Goals:  By discharge, pt will meet the following new goals established on 2/10/25:     Pt will perform DGI at or above cutoff for falls risk assessment.   Pt will ambulate 350ft independently with minimal gait deviations.  Pt will improve strength of B hip ABD to at least 4/5.

## 2025-03-03 ENCOUNTER — APPOINTMENT (OUTPATIENT)
Dept: PHYSICAL THERAPY | Facility: CLINIC | Age: 80
End: 2025-03-03
Payer: MEDICARE

## 2025-03-03 DIAGNOSIS — M25.672 ANKLE STIFFNESS, LEFT: Primary | ICD-10-CM

## 2025-03-03 DIAGNOSIS — R26.2 DIFFICULTY WALKING: ICD-10-CM

## 2025-03-03 DIAGNOSIS — M25.472 EDEMA OF LEFT ANKLE: ICD-10-CM

## 2025-03-03 PROCEDURE — 97112 NEUROMUSCULAR REEDUCATION: CPT | Mod: GP

## 2025-03-03 ASSESSMENT — PAIN - FUNCTIONAL ASSESSMENT: PAIN_FUNCTIONAL_ASSESSMENT: 0-10

## 2025-03-03 ASSESSMENT — PAIN SCALES - GENERAL: PAINLEVEL_OUTOF10: 0 - NO PAIN

## 2025-03-03 NOTE — PROGRESS NOTES
Physical Therapy    Physical Therapy Treatment    Patient Name: Zena Baxter  MRN: [id number]  Today's Date: 3/3/2025    Time Entry:   Time Calculation  Start Time: 1501  Stop Time: 1548  Time Calculation (min): 47 min     PT Therapeutic Procedures Time Entry  Neuromuscular Re-Education Time Entry: 47                 Visit #22 (3 of 10 new)   2024  90 DAY 5/11/2025   7th visit in 2025    Assessment:   Pt tolerated session well without adverse effect and is making some progress toward goals. Harness available this date and pt adequately challenged with use of hurry cane today with various dynamic balance tasks. Challenged by end of session with anterior & lateral stepping over hurdles. Only anterior LOB with stepping over hurdles a few times. Otherwise no major LOB. Pt will continue to benefit from PT to address her functional mobility and balance.     Plan:   Hip strengthening, DGI tasks & walking without device.   As able:   Cable column walkouts with SPC  Harnessed:   Stepping over sticks fwd & lateral   Practice tighter turns   Hurdles   Stepping strategy   Balloon volley at slower speeds to challenge static balance with head/arm movement    Current Problem  1. Ankle stiffness, left        2. Difficulty walking        3. Edema of left ankle          General   Pt states she lost her old cane and arrives at session with a new SPC with hurry cane base.        Subjective    Precautions  Precautions  STEADI Fall Risk Score (The score of 4 or more indicates an increased risk of falling): 9  Vital Signs     Pain  Pain Assessment  Pain Assessment: 0-10  0-10 (Numeric) Pain Score: 0 - No pain    Objective        Treatments:  Current HEP:   Supine quad set + SLR   Standing hip ABD   Bridges- legs on bolster   LAQ with 3# ankle weight or TB  Resisted HS curls with blue TB    Mini squats   Lateral & AP/staggered weight shifting  Side stepping with countertop  Access Code: [email or web address]     Neuromuscular Re-education (26641):  47 minutes  Harnessed: with hurry cane  --Multidirectional walking with hurry cane  --180 deg turns then 360 deg turns when walking  --Hurdles anterior & lateral non-reciprocating    Goals:  By discharge, pt will meet the following new goals established on 2/10/25:     Pt will perform DGI at or above cutoff for falls risk assessment.   Pt will ambulate 350ft independently with minimal gait deviations.  Pt will improve strength of B hip ABD to at least 4/5.

## 2025-03-05 ENCOUNTER — APPOINTMENT (OUTPATIENT)
Dept: PHYSICAL THERAPY | Facility: CLINIC | Age: 80
End: 2025-03-05
Payer: MEDICARE

## 2025-03-05 DIAGNOSIS — M25.472 EDEMA OF LEFT ANKLE: ICD-10-CM

## 2025-03-05 DIAGNOSIS — R26.2 DIFFICULTY WALKING: ICD-10-CM

## 2025-03-05 DIAGNOSIS — M25.672 ANKLE STIFFNESS, LEFT: Primary | ICD-10-CM

## 2025-03-05 PROCEDURE — 97112 NEUROMUSCULAR REEDUCATION: CPT | Mod: GP,CQ

## 2025-03-05 PROCEDURE — 97110 THERAPEUTIC EXERCISES: CPT | Mod: GP,CQ

## 2025-03-05 NOTE — PROGRESS NOTES
Physical Therapy    Physical Therapy Treatment    Patient Name: Zena Baxter  MRN: [id number]  Today's Date: 3/5/2025    Time Entry:   Time Calculation  Start Time: 0900  Stop Time: 0940  Time Calculation (min): 40 min     PT Therapeutic Procedures Time Entry  Neuromuscular Re-Education Time Entry: 30  Therapeutic Exercise Time Entry: 10                 Visit #23 (4 of 10 new)   2024  90 DAY 5/11/2025   7th visit in 2025    Assessment:   Pt tolerated session well without adverse effect and is making some progress toward goals.  Side stepping in // bars challenging today. Attempted without UE support and it was difficult especially when side stepping to the left.  Fatigued with leg strengthening but tolerated well.  Pt will continue to benefit from PT to address her functional mobility and balance.     Plan:   Hip strengthening, DGI tasks & walking without device.   As able:   Cable column walkouts with SPC  Harnessed:   Stepping over sticks fwd & lateral   Practice tighter turns   Hurdles   Stepping strategy   Balloon volley at slower speeds to challenge static balance with head/arm movement    Current Problem  1. Ankle stiffness, left        2. Difficulty walking        3. Edema of left ankle          General   Pt states she didn't sleep well last night due to having to come to this appointment so early.   Pt. Reports she fell yesterday on her buttocks, denies injury. Was able to get up on her own using the couch.    Subjective    Precautions   Precautions  STEADI Fall Risk Score (The score of 4 or more indicates an increased risk of falling): 9       Objective        Treatments:  Current HEP:   Supine quad set + SLR   Standing hip ABD   Bridges- legs on bolster   LAQ with 3# ankle weight or TB  Resisted HS curls with blue TB    Mini squats   Lateral & AP/staggered weight shifting  Side stepping with countertop  Access Code: [email or web address]     Neuromuscular Re-education (61827): 30 minutes  In // bars: high stepping  and retro walking using the hurry cane x 6 reps  Side stepping using the // bar.  Hurdles anterior and lateral with one UE support multiple trials of each.    Therapeutic Exercise (85406): 10 minutes  3# LAQ's 2 x 15 reps  Seated hip flexion 3# 2 x 10 reps  Standing hip abduction 3# x 10 reps        Goals:  By discharge, pt will meet the following new goals established on 2/10/25:     Pt will perform DGI at or above cutoff for falls risk assessment.   Pt will ambulate 350ft independently with minimal gait deviations.  Pt will improve strength of B hip ABD to at least 4/5.

## 2025-03-09 NOTE — PROGRESS NOTES
"Physical Therapy    Physical Therapy Treatment    Patient Name: Zena Baxter  MRN: [id number]  Today's Date: 3/10/2025    Time Entry:   Time Calculation  Start Time: 1513  Stop Time: 1545  Time Calculation (min): 32 min     PT Therapeutic Procedures Time Entry  Neuromuscular Re-Education Time Entry: 32                 Visit #24 (5 of 10 new)   2024  90 DAY 5/11/2025   9th visit in 2025    Assessment:   Pt tolerated session well without adverse effect and is making some progress toward goals. Session somewhat limited secondary to late arrival. Pt had 1 major anterior LOB in which harness caught her when retrowalking. Pt fatigued during session. Pt will continue to benefit from PT to address her functional mobility and balance.     Plan:   Hip strengthening, DGI tasks & walking without device.   As able:   Cable column walkouts with SPC  Harnessed:   Stepping over sticks fwd & lateral   Practice tighter turns   Hurdles   Stepping strategy   Balloon volley at slower speeds to challenge static balance with head/arm movement    Current Problem  1. Ankle stiffness, left        2. Difficulty walking        3. Edema of left ankle          General   Pt apologetic for late arrival to session. Reports she had a tough week. Had an appt with cardiology since last visit and some lab work was elevated, however was assured there were no major issues. Pt arrives at session feeling \"off\". (Time taken at start of session for subjective history and to regulate nervous system prior to starting PT balance drills).        Subjective    Precautions  Precautions  STEADI Fall Risk Score (The score of 4 or more indicates an increased risk of falling): 9  Vital Signs     Pain  Pain Assessment  Pain Assessment: 0-10  0-10 (Numeric) Pain Score: 0 - No pain    Objective        Treatments:  Current HEP:   Supine quad set + SLR   Standing hip ABD   Bridges- legs on bolster   LAQ with 3# ankle weight or TB  Resisted HS curls with blue TB    Mini " "squats   Lateral & AP/staggered weight shifting  Side stepping with countertop  Access Code: [email or web address]     Neuromuscular Re-education (16473): 32 minutes  Harnessed: with hurry cane  --Multidirectional walking with hurry cane. 1 major anterior LOB with retrowalking   --180 deg turns then 360 deg turns when walking  --Fwd/lateral stepping over 2\" sticks  NOT TODAY:   --Hurdles anterior & lateral non-reciprocating    Goals:  By discharge, pt will meet the following new goals established on 2/10/25:     Pt will perform DGI at or above cutoff for falls risk assessment.   Pt will ambulate 350ft independently with minimal gait deviations.  Pt will improve strength of B hip ABD to at least 4/5.      "

## 2025-03-10 ENCOUNTER — APPOINTMENT (OUTPATIENT)
Dept: PHYSICAL THERAPY | Facility: CLINIC | Age: 80
End: 2025-03-10
Payer: MEDICARE

## 2025-03-10 DIAGNOSIS — R26.2 DIFFICULTY WALKING: ICD-10-CM

## 2025-03-10 DIAGNOSIS — M25.472 EDEMA OF LEFT ANKLE: ICD-10-CM

## 2025-03-10 DIAGNOSIS — M25.672 ANKLE STIFFNESS, LEFT: Primary | ICD-10-CM

## 2025-03-10 PROCEDURE — 97112 NEUROMUSCULAR REEDUCATION: CPT | Mod: GP

## 2025-03-10 ASSESSMENT — PAIN - FUNCTIONAL ASSESSMENT: PAIN_FUNCTIONAL_ASSESSMENT: 0-10

## 2025-03-10 ASSESSMENT — PAIN SCALES - GENERAL: PAINLEVEL_OUTOF10: 0 - NO PAIN

## 2025-03-12 ENCOUNTER — APPOINTMENT (OUTPATIENT)
Dept: PHYSICAL THERAPY | Facility: CLINIC | Age: 80
End: 2025-03-12
Payer: MEDICARE

## 2025-03-12 DIAGNOSIS — R26.2 DIFFICULTY WALKING: ICD-10-CM

## 2025-03-12 DIAGNOSIS — M25.672 ANKLE STIFFNESS, LEFT: Primary | ICD-10-CM

## 2025-03-12 DIAGNOSIS — M25.472 EDEMA OF LEFT ANKLE: ICD-10-CM

## 2025-03-12 PROCEDURE — 97112 NEUROMUSCULAR REEDUCATION: CPT | Mod: GP,CQ

## 2025-03-12 NOTE — PROGRESS NOTES
Physical Therapy    Physical Therapy Treatment    Patient Name: Zena Baxter  MRN: [id number]  Today's Date: 3/12/2025    Time Entry:   Time Calculation  Start Time: 1439  Stop Time: 1520  Time Calculation (min): 41 min     PT Therapeutic Procedures Time Entry  Neuromuscular Re-Education Time Entry: 41                 Visit #25 (6 of 10 new)   2024  90 DAY 5/11/2025   10th visit in 2025    Assessment:   Pt tolerated session well without adverse effect and is making some progress toward goals.  Pt. Challenged with session today possibly due to not sleeping well last night.  Pt. Lost her balance posteriorly and sat in chair behind her.  Pt. Generally unsteady today but was able to correct herself except when she sat in chair behind her. Pt will continue to benefit from PT to address her functional mobility and balance.     Plan:   Hip strengthening, DGI tasks & walking without device.   As able:   Cable column walkouts with SPC  Harnessed:   Stepping over sticks fwd & lateral   Practice tighter turns   Hurdles   Stepping strategy   Balloon volley at slower speeds to challenge static balance with head/arm movement    Current Problem  1. Ankle stiffness, left        2. Difficulty walking        3. Edema of left ankle          General   Pt reports no issues since Monday didn't sleep well last night.     Subjective    Precautions   Precautions  STEADI Fall Risk Score (The score of 4 or more indicates an increased risk of falling): 9  Vital Signs     Pain   0/10    Objective        Treatments:  Current HEP:   Supine quad set + SLR   Standing hip ABD   Bridges- legs on bolster   LAQ with 3# ankle weight or TB  Resisted HS curls with blue TB    Mini squats   Lateral & AP/staggered weight shifting  Side stepping with countertop  Access Code: [email or web address]     Neuromuscular Re-education (62627):  41 minutes  Harnessed: with hurry cane  --Multidirectional walking with hurry cane.   --180 deg turns then 360 deg turns when  walking  --stop and go walking with good technique x 2 laps in harness  --horizontal head turns and vertical head nods several laps in harness.  --anterior and lateral stepping over hurdles non-reciprocating, lateral direction more challenging.      Goals:  By discharge, pt will meet the following new goals established on 2/10/25:     Pt will perform DGI at or above cutoff for falls risk assessment.   Pt will ambulate 350ft independently with minimal gait deviations.  Pt will improve strength of B hip ABD to at least 4/5.

## 2025-03-16 NOTE — PROGRESS NOTES
"Physical Therapy    Physical Therapy Treatment    Patient Name: Zena Baxter  MRN: [id number]  Today's Date: 3/16/2025    Time Entry:                           Visit #26 (7 of 10 new)   2024  90 DAY 5/11/2025   11th visit in 2025    Assessment:   Pt tolerated session well without adverse effect and is making some progress toward goals.     Session somewhat limited secondary to late arrival. Pt had 1 major anterior LOB in which harness caught her when retrowalking. Pt fatigued during session. Pt will continue to benefit from PT to address her functional mobility and balance.     Plan:   Hip strengthening, DGI tasks & walking without device.   As able:     Current Problem  1. Ankle stiffness, left        2. Difficulty walking        3. Edema of left ankle          General   Pt states ***     apologetic for late arrival to session. Reports she had a tough week. Had an appt with cardiology since last visit and some lab work was elevated, however was assured there were no major issues. Pt arrives at session feeling \"off\". (Time taken at start of session for subjective history and to regulate nervous system prior to starting PT balance drills).        Subjective    Precautions     Vital Signs     Pain       Objective        Treatments:  Current HEP:   Supine quad set + SLR   Standing hip ABD   Bridges- legs on bolster   LAQ with 3# ankle weight or TB  Resisted HS curls with blue TB    Mini squats   Lateral & AP/staggered weight shifting  Side stepping with countertop  Access Code: [email or web address]     Neuromuscular Re-education (46299): 32 minutes  Harnessed: with hurry cane  --Multidirectional walking with hurry cane. 1 major anterior LOB with retrowalking   --180 deg turns then 360 deg turns when walking  --Fwd/lateral stepping over 2\" sticks  NOT TODAY:   --Hurdles anterior & lateral non-reciprocating      ***  Cable column walkouts with SPC  Harnessed:   Stepping over sticks fwd & lateral   Practice tighter turns   Hurdles "   Stepping strategy   Balloon volley at slower speeds to challenge static balance with head/arm movement    Goals:  By discharge, pt will meet the following new goals established on 2/10/25:     Pt will perform DGI at or above cutoff for falls risk assessment.   Pt will ambulate 350ft independently with minimal gait deviations.  Pt will improve strength of B hip ABD to at least 4/5.

## 2025-03-17 ENCOUNTER — APPOINTMENT (OUTPATIENT)
Dept: PHYSICAL THERAPY | Facility: CLINIC | Age: 80
End: 2025-03-17
Payer: MEDICARE

## 2025-03-17 DIAGNOSIS — M25.672 ANKLE STIFFNESS, LEFT: Primary | ICD-10-CM

## 2025-03-17 DIAGNOSIS — M25.472 EDEMA OF LEFT ANKLE: ICD-10-CM

## 2025-03-17 DIAGNOSIS — R26.2 DIFFICULTY WALKING: ICD-10-CM

## 2025-03-19 ENCOUNTER — APPOINTMENT (OUTPATIENT)
Dept: PHYSICAL THERAPY | Facility: CLINIC | Age: 80
End: 2025-03-19
Payer: MEDICARE

## 2025-03-19 DIAGNOSIS — M25.672 ANKLE STIFFNESS, LEFT: Primary | ICD-10-CM

## 2025-03-19 DIAGNOSIS — M25.472 EDEMA OF LEFT ANKLE: ICD-10-CM

## 2025-03-19 DIAGNOSIS — R26.2 DIFFICULTY WALKING: ICD-10-CM

## 2025-03-19 PROCEDURE — 97112 NEUROMUSCULAR REEDUCATION: CPT | Mod: GP,CQ

## 2025-03-19 NOTE — PROGRESS NOTES
Physical Therapy    Physical Therapy Treatment    Patient Name: Zena Baxter  MRN: [id number]  Today's Date: 3/19/2025    Time Entry:   Time Calculation  Start Time: 1451  Stop Time: 1515  Time Calculation (min): 24 min     PT Therapeutic Procedures Time Entry  Neuromuscular Re-Education Time Entry: 24                 Visit #26 (7 of 10 new)   2024  90 DAY 5/11/2025   11th visit in 2025    Assessment:   Pt tolerated session well without adverse effect and is making some progress toward goals. Session somewhat limited secondary to late arrival.  Column walkouts a little challenging due to unsteadiness.   Pt will continue to benefit from PT to address her functional mobility and balance.   Plan:   Hip strengthening, DGI tasks & walking without device.   As able:     Current Problem  1. Ankle stiffness, left        2. Difficulty walking        3. Edema of left ankle          General   Pt. apologetic for late arrival to session, states she was talking to her Cardiologist and is now going to be put on two more meds  for diastolic failure.         Subjective    Precautions   Precautions  STEADI Fall Risk Score (The score of 4 or more indicates an increased risk of falling): 9         Objective        Treatments:  Current HEP:   Supine quad set + SLR   Standing hip ABD   Bridges- legs on bolster   LAQ with 3# ankle weight or TB  Resisted HS curls with blue TB    Mini squats   Lateral & AP/staggered weight shifting  Side stepping with countertop  Access Code: [email or web address]     Neuromuscular Re-education (91429): 24 minutes  Cable column walkouts with hurry cane with 5# side stepping to left and right x 3 laps each direction with min/cga due to unsteadiness.  In // bars: forward and retro walking with light UE support on the left multiple reps.  Anterior and lateral stepping over sticks with light UE support multiple reps    Goals:  By discharge, pt will meet the following new goals established on 2/10/25:     Pt will perform  DGI at or above cutoff for falls risk assessment.   Pt will ambulate 350ft independently with minimal gait deviations.  Pt will improve strength of B hip ABD to at least 4/5.

## 2025-03-23 NOTE — PROGRESS NOTES
"Physical Therapy    Physical Therapy Treatment    Patient Name: Zena Baxter  MRN: [id number]  Today's Date: 3/24/2025    Time Entry:   Time Calculation  Start Time: 1510  Stop Time: 1550  Time Calculation (min): 40 min     PT Therapeutic Procedures Time Entry  Neuromuscular Re-Education Time Entry: 4  Therapeutic Exercise Time Entry: 36                 Visit #27 (8 of 10 new)   2024  90 DAY 5/11/2025   12th visit in 2025    Assessment:   Pt tolerated session well without adverse effect and is making slow progress toward goals. Per subjective, pt's medication adjustments for BP & HR have caused her to feel \"off\". Pt is aware of how to modify activity and slow speed of transfers due to orthostatics. Pt reports noticing decreased sensation/awareness of contraction of gluteals, however did feel \"giddiness\" in her hips with resisted hip ABD/ER with 2 TB in sitting. Pt will continue to benefit from PT to address her functional mobility and balance.     Plan:   Review seated hip ABD with 2 TB resistance above & below knees.  Hip strengthening, DGI tasks & walking without device.   As able:   Bridges with TB at knees   Cable column walkouts with SPC  Harnessed:   Stepping over sticks fwd & lateral   Practice tighter turns   Hurdles   Stepping strategy   Balloon volley at slower speeds to challenge static balance with head/arm movement    Current Problem  1. Ankle stiffness, left        2. Difficulty walking        3. Edema of left ankle          General   Pt states she is still feels \"off\" at times. Reports her new BP medication really makes her BP fluctuate, and there are times where her BP can be low. (Referring to Lasix & Losartan & Metoprolol). Reports these medications have appropriately brought her HR down. Continues to have fluctuating sleep quality.   Outside of the times where she feels \"off\", she reports she does feel she has more energy. However, continues to feel significant hip weakness.   Reports she has " been working on her HEP for hip ABD strengthening, however at times can feel posterior hip & LBP.        Subjective    Precautions  Precautions  STEADI Fall Risk Score (The score of 4 or more indicates an increased risk of falling): 9  Vital Signs     Pain  Pain Assessment  Pain Assessment: 0-10  0-10 (Numeric) Pain Score: 0 - No pain    Objective        Treatments:  Therapeutic Exercise (27526): 36 minutes  Modified side plank lifts x10 reps B    Standing hip ABD isometric at wall 3x30 sec holds B   Comments: did have 2 posterior LOB during    Current HEP:   Supine quad set + SLR   Standing hip ABD   Bridges- legs on bolster   LAQ with 3# ankle weight or TB  Resisted HS curls with blue TB    Mini squats   Lateral & AP/staggered weight shifting  Side stepping with countertop  Access Code: [email or web address]     Access Code: [email or web address]  URL: [email or web address] [email or web address]  Date: 03/24/2025  Prepared by: Deisy Zazueta    Exercises  - Active Straight Leg Raise with Quad Set (Mirrored)  - 1 x daily - 7 x weekly - 2-3 sets - 10 reps - 5 seconds hold  - Standing Hip Abduction (Mirrored)  - 2 x daily - 7 x weekly - 3 sets - 10 reps - 2 sec hold  - Sitting Knee Extension with Resistance (Mirrored)  - 1 x daily - 7 x weekly - 3 sets - 10 reps - 1-2 seconds hold  - Seated Hamstring Curl with Anchored Resistance  - 1 x daily - 7 x weekly - 3 sets - 10 reps - 1-2 seconds hold  - Mini Squat with Counter Support  - 1-2 x daily - 7 x weekly - 3 sets - 10 reps - 1-2 seconds hold  - Side Stepping with Counter Support  - 2 x daily - 7 x weekly - 1 sets - 1 reps - 2 minute bout hold  - Side Plank on Knees  - 1 x daily - 7 x weekly - 1 sets - 10 reps - 5 seconds hold  - Seated Hip Abduction with Resistance  - 1 x daily - 7 x weekly - 3 sets - 10 reps - 5-20 seconds hold    Neuromuscular Re-education (76338): 4 minutes  Standing with elevated mat table anteriorly:   --Head turns x10  reps B       Harnessed: with hurry  "cane  --Multidirectional walking with hurry cane. 1 major anterior LOB with retrowalking   --180 deg turns then 360 deg turns when walking  --Fwd/lateral stepping over 2\" sticks  NOT TODAY:   --Hurdles anterior & lateral non-reciprocating    Goals:  By discharge, pt will meet the following new goals established on 2/10/25:     Pt will perform DGI at or above cutoff for falls risk assessment.   Pt will ambulate 350ft independently with minimal gait deviations.  Pt will improve strength of B hip ABD to at least 4/5.      "

## 2025-03-24 ENCOUNTER — APPOINTMENT (OUTPATIENT)
Dept: PHYSICAL THERAPY | Facility: CLINIC | Age: 80
End: 2025-03-24
Payer: MEDICARE

## 2025-03-24 DIAGNOSIS — M25.672 ANKLE STIFFNESS, LEFT: Primary | ICD-10-CM

## 2025-03-24 DIAGNOSIS — M25.472 EDEMA OF LEFT ANKLE: ICD-10-CM

## 2025-03-24 DIAGNOSIS — R26.2 DIFFICULTY WALKING: ICD-10-CM

## 2025-03-24 PROCEDURE — 97110 THERAPEUTIC EXERCISES: CPT | Mod: GP

## 2025-03-24 ASSESSMENT — PAIN - FUNCTIONAL ASSESSMENT: PAIN_FUNCTIONAL_ASSESSMENT: 0-10

## 2025-03-24 ASSESSMENT — PAIN SCALES - GENERAL: PAINLEVEL_OUTOF10: 0 - NO PAIN

## 2025-03-31 ENCOUNTER — APPOINTMENT (OUTPATIENT)
Dept: PHYSICAL THERAPY | Facility: CLINIC | Age: 80
End: 2025-03-31
Payer: MEDICARE

## 2025-03-31 DIAGNOSIS — R26.2 DIFFICULTY WALKING: ICD-10-CM

## 2025-03-31 DIAGNOSIS — M25.672 ANKLE STIFFNESS, LEFT: Primary | ICD-10-CM

## 2025-03-31 DIAGNOSIS — M25.472 EDEMA OF LEFT ANKLE: ICD-10-CM

## 2025-03-31 PROCEDURE — 97112 NEUROMUSCULAR REEDUCATION: CPT | Mod: GP,CQ

## 2025-03-31 PROCEDURE — 97110 THERAPEUTIC EXERCISES: CPT | Mod: GP,CQ

## 2025-03-31 NOTE — PROGRESS NOTES
Physical Therapy    Physical Therapy Treatment    Patient Name: Zena Baxter  MRN: [id number]  Today's Date: 3/31/2025    Time Entry:   Time Calculation  Start Time: 1527  Stop Time: 1610  Time Calculation (min): 43 min     PT Therapeutic Procedures Time Entry  Neuromuscular Re-Education Time Entry: 28  Therapeutic Exercise Time Entry: 15                 Visit #28 (9 of 10 new)   2024  90 DAY 5/11/2025   13th visit in 2025    Assessment:   Pt tolerated session well without adverse effect and is making slow progress toward goals. Multidirectional walking a little challenging today but no major loss of balance.  Pt. Benefits from lower extremity strengthening and has been using her therabands at home for hip abduction.  Pt will continue to benefit from PT to address her functional mobility and balance.     Plan:   Review seated hip ABD with 2 TB resistance above & below knees.  Hip strengthening, DGI tasks & walking without device.   As able:   Bridges with TB at knees   Cable column walkouts with SPC  Harnessed:   Stepping over sticks fwd & lateral   Practice tighter turns   Hurdles   Stepping strategy   Balloon volley at slower speeds to challenge static balance with head/arm movement    Current Problem  1. Ankle stiffness, left        2. Difficulty walking        3. Edema of left ankle          General   Pt states her BP was low today, denies having any symptoms of low BP.    Subjective    Precautions   Precautions  STEADI Fall Risk Score (The score of 4 or more indicates an increased risk of falling): 9  Vital Signs   /64  left arm -automatic  Pain   0/10    Objective        Treatments:  Therapeutic Exercise (54376): 15 minutes  Reviewed and performed seated hip abduction using both green and red t-band above and below her knees x 10 reps holding 5-10 seconds  Seated LAQ's with 3# bilat x 10 reps.    Current HEP:   Supine quad set + SLR   Standing hip ABD   Bridges- legs on bolster   LAQ with 3# ankle  weight or TB  Resisted HS curls with blue TB    Mini squats   Lateral & AP/staggered weight shifting  Side stepping with countertop  Access Code: [email or web address]     Access Code: [email or web address]  URL: [email or web address]  Date: 03/24/2025  Prepared by: Deisy Zazueta    Exercises  - Active Straight Leg Raise with Quad Set (Mirrored)  - 1 x daily - 7 x weekly - 2-3 sets - 10 reps - 5 seconds hold  - Standing Hip Abduction (Mirrored)  - 2 x daily - 7 x weekly - 3 sets - 10 reps - 2 sec hold  - Sitting Knee Extension with Resistance (Mirrored)  - 1 x daily - 7 x weekly - 3 sets - 10 reps - 1-2 seconds hold  - Seated Hamstring Curl with Anchored Resistance  - 1 x daily - 7 x weekly - 3 sets - 10 reps - 1-2 seconds hold  - Mini Squat with Counter Support  - 1-2 x daily - 7 x weekly - 3 sets - 10 reps - 1-2 seconds hold  - Side Stepping with Counter Support  - 2 x daily - 7 x weekly - 1 sets - 1 reps - 2 minute bout hold  - Side Plank on Knees  - 1 x daily - 7 x weekly - 1 sets - 10 reps - 5 seconds hold  - Seated Hip Abduction with Resistance  - 1 x daily - 7 x weekly - 3 sets - 10 reps - 5-20 seconds hold    Neuromuscular Re-education (78757): 28 minutes  Harnessed: with hurry cane  --Multidirectional walking with hurry cane. Retro walking, side stepping and 180 degree turns.  Retro walking more challenging today, unsteady at times provided cga throughout.   Side stepping x 4 laps while wearing 3# ankle weights.  NOT TODAY:   --Hurdles anterior & lateral non-reciprocating    Goals:  By discharge, pt will meet the following new goals established on 2/10/25:     Pt will perform DGI at or above cutoff for falls risk assessment.   Pt will ambulate 350ft independently with minimal gait deviations.  Pt will improve strength of B hip ABD to at least 4/5.

## 2025-04-02 ENCOUNTER — APPOINTMENT (OUTPATIENT)
Dept: PHYSICAL THERAPY | Facility: CLINIC | Age: 80
End: 2025-04-02
Payer: MEDICARE

## 2025-04-02 DIAGNOSIS — M25.472 EDEMA OF LEFT ANKLE: ICD-10-CM

## 2025-04-02 DIAGNOSIS — M25.672 ANKLE STIFFNESS, LEFT: Primary | ICD-10-CM

## 2025-04-02 DIAGNOSIS — R26.2 DIFFICULTY WALKING: ICD-10-CM

## 2025-04-02 PROCEDURE — 97110 THERAPEUTIC EXERCISES: CPT | Mod: GP

## 2025-04-02 ASSESSMENT — PAIN SCALES - GENERAL: PAINLEVEL_OUTOF10: 0 - NO PAIN

## 2025-04-02 ASSESSMENT — PAIN - FUNCTIONAL ASSESSMENT: PAIN_FUNCTIONAL_ASSESSMENT: 0-10

## 2025-04-08 ENCOUNTER — APPOINTMENT (OUTPATIENT)
Dept: PHYSICAL THERAPY | Facility: CLINIC | Age: 80
End: 2025-04-08
Payer: MEDICARE

## 2025-04-08 DIAGNOSIS — M25.472 EDEMA OF LEFT ANKLE: ICD-10-CM

## 2025-04-08 DIAGNOSIS — R26.2 DIFFICULTY WALKING: ICD-10-CM

## 2025-04-08 DIAGNOSIS — M25.672 ANKLE STIFFNESS, LEFT: Primary | ICD-10-CM

## 2025-04-08 PROCEDURE — 97112 NEUROMUSCULAR REEDUCATION: CPT | Mod: GP,CQ

## 2025-04-08 NOTE — PROGRESS NOTES
"Physical Therapy    Physical Therapy Treatment    Patient Name: Zena Baxter  MRN: [id number]  Today's Date: 4/8/2025    Time Entry:   Time Calculation  Start Time: 1300  Stop Time: 1348  Time Calculation (min): 48 min     PT Therapeutic Procedures Time Entry  Neuromuscular Re-Education Time Entry: 48                 Visit #30 (1 of 10 new)   2024  90 DAY 5/11/2025   15th visit in 2025    Assessment:   Pt tolerated session well today, fatigued with using the ankle weights more so with side  stepping.   Did discuss using the wheeled walker when coming to therapy, pt. seems receptive.    Pt will continue to benefit from PT to address her functional mobility and balance.      Goals:  By discharge, pt will meet the following new goals established on 2/10/25:     Pt will perform DGI at or above cutoff for falls risk assessment.--PARTIALLY MET  Pt will ambulate 350ft independently with minimal gait deviations.--PARTIALLY MET  Pt will improve strength of B hip ABD to at least 4/5.--PARTIALLY MET    Plan:   Con't POC    Hip strengthening and DGI tasks & walking without device.   As able:   Bridges with TB at knees   Cable column walkouts with SPC  Harnessed:   Stepping over sticks fwd & lateral   Practice tighter turns   Hurdles   Stepping strategy   Balloon volley at slower speeds to challenge static balance with head/arm movement    Current Problem  1. Ankle stiffness, left        2. Difficulty walking        3. Edema of left ankle          General   Pt has been using the wheeled walker more often.  No falls since last visit.    Subjective    Precautions   Precautions  STEADI Fall Risk Score (The score of 4 or more indicates an increased risk of falling): 9         Objective      Treatments:    Neuromuscular Re-education (42488): 48 minutes   In harness with hurry cane:  -Multidirectional walking with hurry cane. --180 deg turns , side stepping and retro walking.  --Fwd/lateral stepping over 2\" sticks, added 2#ankle " weights  for forward walking over sticks, removed sticks for lateral stepping as lateral stepping over sticks was challenging.  --walking forward and retro walking with sticks in between feet multiple reps.    Reviewed Current HEP:   Supine quad set + SLR   Standing hip ABD   Bridges- legs on bolster   LAQ with 3# ankle weight or TB  Resisted HS curls with blue TB    Mini squats   Lateral & AP/staggered weight shifting  Side stepping with countertop  Seated hip ABD/ER with TB   Access Code: [email or web address]     Access Code: [email or web address]  URL: [email or web address]  Date: 03/24/2025  Prepared by: Deisy Zazueta    Exercises  - Active Straight Leg Raise with Quad Set (Mirrored)  - 1 x daily - 7 x weekly - 2-3 sets - 10 reps - 5 seconds hold  - Standing Hip Abduction (Mirrored)  - 2 x daily - 7 x weekly - 3 sets - 10 reps - 2 sec hold  - Sitting Knee Extension with Resistance (Mirrored)  - 1 x daily - 7 x weekly - 3 sets - 10 reps - 1-2 seconds hold  - Seated Hamstring Curl with Anchored Resistance  - 1 x daily - 7 x weekly - 3 sets - 10 reps - 1-2 seconds hold  - Mini Squat with Counter Support  - 1-2 x daily - 7 x weekly - 3 sets - 10 reps - 1-2 seconds hold  - Side Stepping with Counter Support  - 2 x daily - 7 x weekly - 1 sets - 1 reps - 2 minute bout hold  - Side Plank on Knees  - 1 x daily - 7 x weekly - 1 sets - 10 reps - 5 seconds hold  - Seated Hip Abduction with Resistance  - 1 x daily - 7 x weekly - 3 sets - 10 reps - 5-20 seconds hold

## 2025-04-11 ENCOUNTER — TREATMENT (OUTPATIENT)
Dept: PHYSICAL THERAPY | Facility: CLINIC | Age: 80
End: 2025-04-11
Payer: MEDICARE

## 2025-04-11 DIAGNOSIS — M25.672 ANKLE STIFFNESS, LEFT: Primary | ICD-10-CM

## 2025-04-11 DIAGNOSIS — R26.2 DIFFICULTY WALKING: ICD-10-CM

## 2025-04-11 DIAGNOSIS — M25.472 EDEMA OF LEFT ANKLE: ICD-10-CM

## 2025-04-11 PROCEDURE — 97112 NEUROMUSCULAR REEDUCATION: CPT | Mod: GP

## 2025-04-11 ASSESSMENT — PAIN SCALES - GENERAL: PAINLEVEL_OUTOF10: 0 - NO PAIN

## 2025-04-11 ASSESSMENT — PAIN - FUNCTIONAL ASSESSMENT: PAIN_FUNCTIONAL_ASSESSMENT: 0-10

## 2025-04-11 NOTE — PROGRESS NOTES
"Physical Therapy    Physical Therapy Treatment    Patient Name: Zena Baxter  MRN: [id number]  Today's Date: 4/11/2025    Time Entry:   Time Calculation  Start Time: 1303  Stop Time: 1346  Time Calculation (min): 43 min     PT Therapeutic Procedures Time Entry  Neuromuscular Re-Education Time Entry: 43                 Visit #31 (2 of 10 new)   2024  90 DAY 5/11/2025   16th visit in 2025    Assessment:   Pt tolerated session well today without adverse effect. Did require seated rest break ~1/2 to 3/4 of the way through the session. Was able to perform rightward side stepping to the right without SPC and walking fwd with 2\" sticks between feet. Frequent LOB when turning, as well as frequent AP LOB when side stepping. Pt will continue to benefit from PT to address her functional mobility and balance.     Plan:   Hip strengthening and DGI tasks & walking without device.   As able:   Work on AP weight shifting control.   Bridges with TB at knees   Cable column walkouts with SPC  Harnessed:   Stepping over sticks fwd & lateral   Practice tighter turns   Hurdles   Stepping strategy   Balloon volley at slower speeds to challenge static balance with head/arm movement    Current Problem  1. Ankle stiffness, left        2. Difficulty walking        3. Edema of left ankle          General   Pt states she was recommended to take a new medication for heart failure and started this yesterday. Is to have more blood work at the end of the month. No new falls.     Subjective    Precautions  Precautions  STEADI Fall Risk Score (The score of 4 or more indicates an increased risk of falling): 9  Pain Assessment  Pain Assessment: 0-10  0-10 (Numeric) Pain Score: 0 - No pain    Objective      Treatments:    Neuromuscular Re-education (68046): 43 minutes   In harness with hurry cane:  --Walking fwd over 2\" sticks x4 laps without SCP  --Side stepping along harness track without device leading with R LE and with SPC when side stepping " "leading with L LE   --Stepping fwd & over 2\" sticks with SPC    Reviewed Current HEP:   Supine quad set + SLR   Standing hip ABD   Bridges- legs on bolster   LAQ with 3# ankle weight or TB  Resisted HS curls with blue TB    Mini squats   Lateral & AP/staggered weight shifting  Side stepping with countertop  Seated hip ABD/ER with TB   Access Code: [email or web address]     Access Code: [email or web address]  URL: [email or web address]  Date: 03/24/2025  Prepared by: Deisy Zazueta    Exercises  - Active Straight Leg Raise with Quad Set (Mirrored)  - 1 x daily - 7 x weekly - 2-3 sets - 10 reps - 5 seconds hold  - Standing Hip Abduction (Mirrored)  - 2 x daily - 7 x weekly - 3 sets - 10 reps - 2 sec hold  - Sitting Knee Extension with Resistance (Mirrored)  - 1 x daily - 7 x weekly - 3 sets - 10 reps - 1-2 seconds hold  - Seated Hamstring Curl with Anchored Resistance  - 1 x daily - 7 x weekly - 3 sets - 10 reps - 1-2 seconds hold  - Mini Squat with Counter Support  - 1-2 x daily - 7 x weekly - 3 sets - 10 reps - 1-2 seconds hold  - Side Stepping with Counter Support  - 2 x daily - 7 x weekly - 1 sets - 1 reps - 2 minute bout hold  - Side Plank on Knees  - 1 x daily - 7 x weekly - 1 sets - 10 reps - 5 seconds hold  - Seated Hip Abduction with Resistance  - 1 x daily - 7 x weekly - 3 sets - 10 reps - 5-20 seconds hold    Goals:  By discharge, pt will meet the following new goals established on 2/10/25:     Pt will perform DGI at or above cutoff for falls risk assessment.--PARTIALLY MET  Pt will ambulate 350ft independently with minimal gait deviations.--PARTIALLY MET  Pt will improve strength of B hip ABD to at least 4/5.--PARTIALLY MET    "

## 2025-04-21 ENCOUNTER — APPOINTMENT (OUTPATIENT)
Dept: PHYSICAL THERAPY | Facility: CLINIC | Age: 80
End: 2025-04-21
Payer: MEDICARE

## 2025-04-21 DIAGNOSIS — M25.672 ANKLE STIFFNESS, LEFT: Primary | ICD-10-CM

## 2025-04-21 DIAGNOSIS — M25.472 EDEMA OF LEFT ANKLE: ICD-10-CM

## 2025-04-21 DIAGNOSIS — R26.2 DIFFICULTY WALKING: ICD-10-CM

## 2025-04-21 PROCEDURE — 97112 NEUROMUSCULAR REEDUCATION: CPT | Mod: GP,CQ

## 2025-04-21 NOTE — PROGRESS NOTES
"Physical Therapy    Physical Therapy Treatment    Patient Name: Zena Baxter  MRN: [id number]  Today's Date: 4/21/2025    Time Entry:   Time Calculation  Start Time: 1344  Stop Time: 1427  Time Calculation (min): 43 min     PT Therapeutic Procedures Time Entry  Neuromuscular Re-Education Time Entry: 38  Therapeutic Exercise Time Entry: 5                 Visit #32 (3 of 10 new)   2024  90 DAY 5/11/2025   17th visit in 2025    Assessment:   Pt tolerated session well today without adverse effect.Pt will continue to benefit from PT to address her functional mobility and balance.     Plan:   Hip strengthening and DGI tasks & walking without device.   As able:   Work on AP weight shifting control.   Bridges with TB at knees   Cable column walkouts with SPC  Harnessed:   Stepping over sticks fwd & lateral   Practice tighter turns   Hurdles   Stepping strategy   Balloon volley at slower speeds to challenge static balance with head/arm movement    Current Problem  1. Ankle stiffness, left        2. Difficulty walking        3. Edema of left ankle          General   Pt states she is tired today from the holiday yesterday.     Subjective    Precautions   Precautions  STEADI Fall Risk Score (The score of 4 or more indicates an increased risk of falling): 9       Objective      Treatments:    Neuromuscular Re-education (95362): 38 minutes   In harness with hurry cane:  --pt. Ambulated forward and backwards multiple reps, cues to widen base of support.  --Walking fwd over 2\" sticks x 6 laps using a reciprocating gait pattern  --side stepping over 2\" sticks using hurry cane multiple reps  --Walking with sticks between feet multiple reps to work on widening base of support.    Therapeutic Exercise (73049): 5 minutes  Seated hip abduction using blue theraband x 20 reps        Reviewed Current HEP:   Supine quad set + SLR   Standing hip ABD   Bridges- legs on bolster   LAQ with 3# ankle weight or TB  Resisted HS curls with blue TB "    Mini squats   Lateral & AP/staggered weight shifting  Side stepping with countertop  Seated hip ABD/ER with TB   Access Code: [email or web address]     Access Code: [email or web address]  URL: [email or web address]  Date: 03/24/2025  Prepared by: Deisy Zazueta    Exercises  - Active Straight Leg Raise with Quad Set (Mirrored)  - 1 x daily - 7 x weekly - 2-3 sets - 10 reps - 5 seconds hold  - Standing Hip Abduction (Mirrored)  - 2 x daily - 7 x weekly - 3 sets - 10 reps - 2 sec hold  - Sitting Knee Extension with Resistance (Mirrored)  - 1 x daily - 7 x weekly - 3 sets - 10 reps - 1-2 seconds hold  - Seated Hamstring Curl with Anchored Resistance  - 1 x daily - 7 x weekly - 3 sets - 10 reps - 1-2 seconds hold  - Mini Squat with Counter Support  - 1-2 x daily - 7 x weekly - 3 sets - 10 reps - 1-2 seconds hold  - Side Stepping with Counter Support  - 2 x daily - 7 x weekly - 1 sets - 1 reps - 2 minute bout hold  - Side Plank on Knees  - 1 x daily - 7 x weekly - 1 sets - 10 reps - 5 seconds hold  - Seated Hip Abduction with Resistance  - 1 x daily - 7 x weekly - 3 sets - 10 reps - 5-20 seconds hold    Goals:  By discharge, pt will meet the following new goals established on 2/10/25:     Pt will perform DGI at or above cutoff for falls risk assessment.--PARTIALLY MET  Pt will ambulate 350ft independently with minimal gait deviations.--PARTIALLY MET  Pt will improve strength of B hip ABD to at least 4/5.--PARTIALLY MET

## 2025-04-25 ENCOUNTER — APPOINTMENT (OUTPATIENT)
Dept: PHYSICAL THERAPY | Facility: CLINIC | Age: 80
End: 2025-04-25
Payer: MEDICARE

## 2025-04-25 DIAGNOSIS — R26.2 DIFFICULTY WALKING: ICD-10-CM

## 2025-04-25 DIAGNOSIS — M25.672 ANKLE STIFFNESS, LEFT: Primary | ICD-10-CM

## 2025-04-25 DIAGNOSIS — M25.472 EDEMA OF LEFT ANKLE: ICD-10-CM

## 2025-04-25 PROCEDURE — 97110 THERAPEUTIC EXERCISES: CPT | Mod: GP,KX,CQ

## 2025-04-25 PROCEDURE — 97112 NEUROMUSCULAR REEDUCATION: CPT | Mod: GP,KX,CQ

## 2025-04-25 NOTE — PROGRESS NOTES
Physical Therapy    Physical Therapy Treatment    Patient Name: Zena Baxter  MRN: [id number]  Today's Date: 4/25/2025    Time Entry:   Time Calculation  Start Time: 1336  Stop Time: 1415  Time Calculation (min): 39 min     PT Therapeutic Procedures Time Entry  Neuromuscular Re-Education Time Entry: 31  Therapeutic Exercise Time Entry: 8                 Visit #33 (4 of 10 new)   2024  90 DAY 5/11/2025   18th visit in 2025    Assessment:   Pt tolerated session well today without adverse effect  Pt. Reported some leg fatigue after session, she completed most of session wearing 2# ankle weights. Pt will continue to benefit from PT to address her functional mobility and balance.     Plan:   Hip strengthening and DGI tasks & walking without device.   As able:   Work on AP weight shifting control.   Bridges with TB at knees   Cable column walkouts with SPC  Harnessed:   Stepping over sticks fwd & lateral   Practice tighter turns   Hurdles   Stepping strategy   Balloon volley at slower speeds to challenge static balance with head/arm movement    Current Problem  1. Ankle stiffness, left        2. Difficulty walking        3. Edema of left ankle          General   Pt states she found a new cardiologist through  Dr. Montano.    Subjective    Precautions   Precautions  STEADI Fall Risk Score (The score of 4 or more indicates an increased risk of falling): 9       Objective      Treatments:    Neuromuscular Re-education (31486): 31 minutes  In // bars:  pt. Wearing 2# ankle weights walking forward over hurdles with a step to and reciprocating gait pattern multiple reps.  --lateral stepping over hurdles wearing 2# ankle weights multiple reps with UE support  --alternate toe taps to 6 inch step wearing 2# weights x 20 reps  Therapeutic Exercise (21055): 8 minutes  Seated laq's 2# 3 x 10 reps bilat.       Reviewed Current HEP:   Supine quad set + SLR   Standing hip ABD   Bridges- legs on bolster   LAQ with 3# ankle weight  or TB  Resisted HS curls with blue TB    Mini squats   Lateral & AP/staggered weight shifting  Side stepping with countertop  Seated hip ABD/ER with TB   Access Code: [email or web address]     Access Code: [email or web address]  URL: [email or web address]  Date: 03/24/2025  Prepared by: Deisy Zazueta    Exercises  - Active Straight Leg Raise with Quad Set (Mirrored)  - 1 x daily - 7 x weekly - 2-3 sets - 10 reps - 5 seconds hold  - Standing Hip Abduction (Mirrored)  - 2 x daily - 7 x weekly - 3 sets - 10 reps - 2 sec hold  - Sitting Knee Extension with Resistance (Mirrored)  - 1 x daily - 7 x weekly - 3 sets - 10 reps - 1-2 seconds hold  - Seated Hamstring Curl with Anchored Resistance  - 1 x daily - 7 x weekly - 3 sets - 10 reps - 1-2 seconds hold  - Mini Squat with Counter Support  - 1-2 x daily - 7 x weekly - 3 sets - 10 reps - 1-2 seconds hold  - Side Stepping with Counter Support  - 2 x daily - 7 x weekly - 1 sets - 1 reps - 2 minute bout hold  - Side Plank on Knees  - 1 x daily - 7 x weekly - 1 sets - 10 reps - 5 seconds hold  - Seated Hip Abduction with Resistance  - 1 x daily - 7 x weekly - 3 sets - 10 reps - 5-20 seconds hold    Goals:  By discharge, pt will meet the following new goals established on 2/10/25:     Pt will perform DGI at or above cutoff for falls risk assessment.--PARTIALLY MET  Pt will ambulate 350ft independently with minimal gait deviations.--PARTIALLY MET  Pt will improve strength of B hip ABD to at least 4/5.--PARTIALLY MET

## 2025-04-28 ENCOUNTER — APPOINTMENT (OUTPATIENT)
Dept: PHYSICAL THERAPY | Facility: CLINIC | Age: 80
End: 2025-04-28
Payer: MEDICARE

## 2025-04-28 DIAGNOSIS — R26.2 DIFFICULTY WALKING: ICD-10-CM

## 2025-04-28 DIAGNOSIS — M25.672 ANKLE STIFFNESS, LEFT: Primary | ICD-10-CM

## 2025-04-28 DIAGNOSIS — M25.472 EDEMA OF LEFT ANKLE: ICD-10-CM

## 2025-04-28 PROCEDURE — 97112 NEUROMUSCULAR REEDUCATION: CPT | Mod: GP,KX,CQ

## 2025-04-28 NOTE — PROGRESS NOTES
Physical Therapy    Physical Therapy Treatment    Patient Name: Zena Baxter  MRN: [id number]  Today's Date: 4/28/2025    Time Entry:   Time Calculation  Start Time: 1340  Stop Time: 1430  Time Calculation (min): 50 min     PT Therapeutic Procedures Time Entry  Neuromuscular Re-Education Time Entry: 45  Therapeutic Exercise Time Entry: 5                 Visit #34 (5 of 10 new)   2024  90 DAY 5/11/2025   18th visit in 2025    Assessment:   Pt tolerated session well today without adverse effect.  Column walkouts challenging more so when side stepping to her right , some balance loss when stepping towards the column and min assist to correct.   Pt. Reported fatigue in her legs post session. Pt will continue to benefit from PT to address her functional mobility and balance.     Plan:   Hip strengthening and DGI tasks & walking without device.   As able:   Work on AP weight shifting control.   Bridges with TB at knees   Cable column walkouts with SPC  Harnessed:   Stepping over sticks fwd & lateral   Practice tighter turns   Hurdles   Stepping strategy   Balloon volley at slower speeds to challenge static balance with head/arm movement    Current Problem  1. Ankle stiffness, left        2. Difficulty walking        3. Edema of left ankle          General   Pt states she got a shot of Cortisone this morning in her left wrist.  Pt. States uncomfortable right now, states achy and stiff.    Subjective    Precautions   Precautions  STEADI Fall Risk Score (The score of 4 or more indicates an increased risk of falling): 9       Objective      Treatments:  Neuromuscular Re-education (28633): 45  minutes  In // bars:  pt. Wearing 2# ankle weights walking forward over hurdles with a step to gait pattern multiple reps with one UE support  --lateral stepping over hurdles wearing 2# ankle weights multiple reps with UE support  --lateral stepping over one vini x 10 reps  --alternate toe taps to 6 inch step wearing 2# weights x 20  reps  --Column walkouts with 5# x 3 reps in each direction with side stepping to the right more challenging.  Pt. Used her hurry cane.   Therapeutic Exercise (06355): 5 minutes  Seated laq's 2# 2 x 10 reps bilat.       Reviewed Current HEP:   Supine quad set + SLR   Standing hip ABD   Bridges- legs on bolster   LAQ with 3# ankle weight or TB  Resisted HS curls with blue TB    Mini squats   Lateral & AP/staggered weight shifting  Side stepping with countertop  Seated hip ABD/ER with TB   Access Code: [email or web address]     Access Code: [email or web address]  URL: [email or web address]  Date: 03/24/2025  Prepared by: Deisy Zazueta    Exercises  - Active Straight Leg Raise with Quad Set (Mirrored)  - 1 x daily - 7 x weekly - 2-3 sets - 10 reps - 5 seconds hold  - Standing Hip Abduction (Mirrored)  - 2 x daily - 7 x weekly - 3 sets - 10 reps - 2 sec hold  - Sitting Knee Extension with Resistance (Mirrored)  - 1 x daily - 7 x weekly - 3 sets - 10 reps - 1-2 seconds hold  - Seated Hamstring Curl with Anchored Resistance  - 1 x daily - 7 x weekly - 3 sets - 10 reps - 1-2 seconds hold  - Mini Squat with Counter Support  - 1-2 x daily - 7 x weekly - 3 sets - 10 reps - 1-2 seconds hold  - Side Stepping with Counter Support  - 2 x daily - 7 x weekly - 1 sets - 1 reps - 2 minute bout hold  - Side Plank on Knees  - 1 x daily - 7 x weekly - 1 sets - 10 reps - 5 seconds hold  - Seated Hip Abduction with Resistance  - 1 x daily - 7 x weekly - 3 sets - 10 reps - 5-20 seconds hold    Goals:  By discharge, pt will meet the following new goals established on 2/10/25:     Pt will perform DGI at or above cutoff for falls risk assessment.--PARTIALLY MET  Pt will ambulate 350ft independently with minimal gait deviations.--PARTIALLY MET  Pt will improve strength of B hip ABD to at least 4/5.--PARTIALLY MET

## 2025-05-02 ENCOUNTER — APPOINTMENT (OUTPATIENT)
Dept: PHYSICAL THERAPY | Facility: CLINIC | Age: 80
End: 2025-05-02
Payer: MEDICARE

## 2025-05-02 DIAGNOSIS — R26.2 DIFFICULTY WALKING: ICD-10-CM

## 2025-05-02 DIAGNOSIS — M25.472 EDEMA OF LEFT ANKLE: ICD-10-CM

## 2025-05-02 DIAGNOSIS — M25.672 ANKLE STIFFNESS, LEFT: Primary | ICD-10-CM

## 2025-05-02 PROCEDURE — 97112 NEUROMUSCULAR REEDUCATION: CPT | Mod: GP

## 2025-05-02 ASSESSMENT — PAIN SCALES - GENERAL: PAINLEVEL_OUTOF10: 0 - NO PAIN

## 2025-05-02 ASSESSMENT — PAIN - FUNCTIONAL ASSESSMENT: PAIN_FUNCTIONAL_ASSESSMENT: 0-10

## 2025-05-02 NOTE — PROGRESS NOTES
Physical Therapy    Physical Therapy Treatment    Patient Name: Zena Baxter  MRN: [id number]  Today's Date: 5/2/2025    Time Entry:   Time Calculation  Start Time: 1307  Stop Time: 1345  Time Calculation (min): 38 min     PT Therapeutic Procedures Time Entry  Neuromuscular Re-Education Time Entry: 38                 Visit #35 (6 of 10 new)   2024  90 DAY 5/11/2025   19th visit in 2025    Assessment:   Pt tolerated session well today without adverse effect. She was able to tolerate all strength and balance exercises with intermittent standing/seated rest breaks and without adverse effect. Still with occasional LOB with cable column walkout and ambulating around clinic. Pt will continue to benefit from PT to address her functional mobility and balance.     Supervising physical therapist was responsible for all clinical decision making and was present/provided supervision for entirety of session.     Plan:   Hip strengthening and DGI tasks & walking without device.   As able:   Work on AP weight shifting control.   Bridges with TB at knees   Cable column walkouts with SPC  Harnessed:   Stepping over sticks fwd & lateral   Practice tighter turns   Hurdles   Stepping strategy   Balloon volley at slower speeds to challenge static balance with head/arm movement    Current Problem  1. Ankle stiffness, left        2. Difficulty walking        3. Edema of left ankle          General   Pt states nothing new to report this date.    Subjective    Precautions  Precautions  STEADI Fall Risk Score (The score of 4 or more indicates an increased risk of falling): 9    Pain Assessment  Pain Assessment: 0-10  0-10 (Numeric) Pain Score: 0 - No pain    Objective      Treatments:  Neuromuscular Re-education (99871): 38  minutes  --alternating step ups on 4 inch step 2x 20  --lateral stepping onto airex x 10 reps both directions  --alternate toe taps to 6 inch step wearing 2x 20 reps  --Column walkouts with 5# x 3 reps in each  direction with side stepping to the right more challenging.  Pt. Used her hurry cane.       Reviewed Current HEP:   Supine quad set + SLR   Standing hip ABD   Bridges- legs on bolster   LAQ with 3# ankle weight or TB  Resisted HS curls with blue TB    Mini squats   Lateral & AP/staggered weight shifting  Side stepping with countertop  Seated hip ABD/ER with TB   Access Code: [email or web address]     Access Code: [email or web address]  URL: [email or web address]  Date: 03/24/2025  Prepared by: Deisy Zazueta    Exercises  - Active Straight Leg Raise with Quad Set (Mirrored)  - 1 x daily - 7 x weekly - 2-3 sets - 10 reps - 5 seconds hold  - Standing Hip Abduction (Mirrored)  - 2 x daily - 7 x weekly - 3 sets - 10 reps - 2 sec hold  - Sitting Knee Extension with Resistance (Mirrored)  - 1 x daily - 7 x weekly - 3 sets - 10 reps - 1-2 seconds hold  - Seated Hamstring Curl with Anchored Resistance  - 1 x daily - 7 x weekly - 3 sets - 10 reps - 1-2 seconds hold  - Mini Squat with Counter Support  - 1-2 x daily - 7 x weekly - 3 sets - 10 reps - 1-2 seconds hold  - Side Stepping with Counter Support  - 2 x daily - 7 x weekly - 1 sets - 1 reps - 2 minute bout hold  - Side Plank on Knees  - 1 x daily - 7 x weekly - 1 sets - 10 reps - 5 seconds hold  - Seated Hip Abduction with Resistance  - 1 x daily - 7 x weekly - 3 sets - 10 reps - 5-20 seconds hold    Goals:  By discharge, pt will meet the following new goals established on 2/10/25:     Pt will perform DGI at or above cutoff for falls risk assessment.--PARTIALLY MET  Pt will ambulate 350ft independently with minimal gait deviations.--PARTIALLY MET  Pt will improve strength of B hip ABD to at least 4/5.--PARTIALLY MET

## 2025-05-05 ENCOUNTER — APPOINTMENT (OUTPATIENT)
Dept: PHYSICAL THERAPY | Facility: CLINIC | Age: 80
End: 2025-05-05
Payer: MEDICARE

## 2025-05-05 DIAGNOSIS — M25.672 ANKLE STIFFNESS, LEFT: Primary | ICD-10-CM

## 2025-05-05 DIAGNOSIS — R26.2 DIFFICULTY WALKING: ICD-10-CM

## 2025-05-05 DIAGNOSIS — M25.472 EDEMA OF LEFT ANKLE: ICD-10-CM

## 2025-05-05 PROCEDURE — 97112 NEUROMUSCULAR REEDUCATION: CPT | Mod: GP

## 2025-05-05 NOTE — PROGRESS NOTES
Physical Therapy    Physical Therapy Treatment    Patient Name: Zena Baxter  MRN: [id number]  Today's Date: 5/5/2025    Time Entry:   Time Calculation  Start Time: 1217                       Visit #36 (7 of 10 new)   2024  90 DAY 5/11/2025   19th visit in 2025    Assessment:   Pt had good participation in session, no adverse event. She demos significant unsteadiness causing occasional LOB throughout session using walls/nearby objects to catch herself throughout. Occasional LOB w/cable walkouts when ambulating backward (lowering weight). Pt will continue to benefit from PT to address her functional mobility and balance.           Plan:   Hip strengthening and DGI tasks & walking without device.   As able:   Work on AP weight shifting control.   Bridges with TB at knees   Cable column walkouts with SPC  Harnessed:   Stepping over sticks fwd & lateral   Practice tighter turns   Hurdles   Stepping strategy   Balloon volley at slower speeds to challenge static balance with head/arm movement    Current Problem  1. Ankle stiffness, left        2. Difficulty walking        3. Edema of left ankle          General  Pt reports she has had better days, she feels more off balance than usual today. She feels a little dizzy from her medications and likely lack of water today.     Subjective    Precautions            Objective      Treatments:    4min allotted for pt to go to bathroom    Neuromuscular Re-education (04626): 38  minutes  --BP: RUE, automatic = 129/72mmHg, 73bpm  --Walking w/SPC - for activity tolerance, LE endurance and steadiness -- to fatigue (~2min)  --alternating step ups on 4 inch step 2x 20  --Wobble bd at uni II bar, LUE support, AP WS to improve WS control - 2 sets 2min ea.   --Column walkouts with 5# x 3 reps in each direction with side stepping to the right more challenging.  Pt. Used her hurry cane.       Therapeutic Exercise (37757):  5 minutes  Supine Bridges w/Hip ABD - 3x10, Green  tb              Reviewed Current HEP:   Supine quad set + SLR   Standing hip ABD   Bridges- legs on bolster   LAQ with 3# ankle weight or TB  Resisted HS curls with blue TB    Mini squats   Lateral & AP/staggered weight shifting  Side stepping with countertop  Seated hip ABD/ER with TB   Access Code: [email or web address]     Access Code: [email or web address]  URL: [email or web address]  Date: 03/24/2025  Prepared by: Deisy Zazueta    Exercises  - Active Straight Leg Raise with Quad Set (Mirrored)  - 1 x daily - 7 x weekly - 2-3 sets - 10 reps - 5 seconds hold  - Standing Hip Abduction (Mirrored)  - 2 x daily - 7 x weekly - 3 sets - 10 reps - 2 sec hold  - Sitting Knee Extension with Resistance (Mirrored)  - 1 x daily - 7 x weekly - 3 sets - 10 reps - 1-2 seconds hold  - Seated Hamstring Curl with Anchored Resistance  - 1 x daily - 7 x weekly - 3 sets - 10 reps - 1-2 seconds hold  - Mini Squat with Counter Support  - 1-2 x daily - 7 x weekly - 3 sets - 10 reps - 1-2 seconds hold  - Side Stepping with Counter Support  - 2 x daily - 7 x weekly - 1 sets - 1 reps - 2 minute bout hold  - Side Plank on Knees  - 1 x daily - 7 x weekly - 1 sets - 10 reps - 5 seconds hold  - Seated Hip Abduction with Resistance  - 1 x daily - 7 x weekly - 3 sets - 10 reps - 5-20 seconds hold    Goals:  By discharge, pt will meet the following new goals established on 2/10/25:     Pt will perform DGI at or above cutoff for falls risk assessment.--PARTIALLY MET  Pt will ambulate 350ft independently with minimal gait deviations.--PARTIALLY MET  Pt will improve strength of B hip ABD to at least 4/5.--PARTIALLY MET

## 2025-05-08 NOTE — PROGRESS NOTES
Physical Therapy    Physical Therapy Treatment    Patient Name: Zena Baxter  MRN: [id number]  Today's Date: 5/9/2025    Time Entry:   Time Calculation  Start Time: 1356  Stop Time: 1435  Time Calculation (min): 39 min     PT Therapeutic Procedures Time Entry  Neuromuscular Re-Education Time Entry: 39                 Visit #37 (8 of 10 new)   2024  90 DAY 5/11/2025 21th visit in 2025    Assessment:   Pt tolerated session well today without adverse effect. Session limited secondary to late arrival of pt. Pt does demo improvement within session for foot placement for wider JOE as well as more neutral hip positioning vs ER. Much greater difficulty with side stepping leading with L LE without cane due to R LE weakness and without SPC. Pt will continue to benefit from PT to address her functional mobility and balance.     Plan:   Re-assessment in 2 visits.  Hip strengthening and DGI tasks & walking without device.   As able:   Work on AP weight shifting control.   Bridges with TB at knees   Cable column walkouts with SPC  Harnessed:   Stepping over sticks fwd & lateral   Practice tighter turns   Hurdles   Stepping strategy   Balloon volley at slower speeds to challenge static balance with head/arm movement    Current Problem  1. Ankle stiffness, left        2. Difficulty walking        3. Edema of left ankle          General   Pt apologetic for late arrival to session as she fell asleep at the kitchen table. Stayed out late last night. Reports her goal is to be able to walk her dog, but her dog does not pull.   Was able to cut her grass- has a ride on mower.     Subjective    Precautions  Precautions  STEADI Fall Risk Score (The score of 4 or more indicates an increased risk of falling): 9    Pain Assessment  Pain Assessment: 0-10  0-10 (Numeric) Pain Score: 0 - No pain    Objective      Treatments:  Neuromuscular Re-education (67218): 39 minutes  Harnessed:   --Side stepping with SPC x2 laps. Added green bungee  "cord resistance and performed x2 laps with cues to increase speed and neutral foot positioning.   --Side stepping over 2\" sticks: x2 laps with SPC then x1-2 laps without cane  --Stepping fwd over 2\" sticks without cane x3 laps, however significant difficulty with frequent LOB when leading with L LE fwd, due to R LE weakness without SPC      NOT TODAY:  --alternating step ups on 4 inch step 2x 20  --lateral stepping onto airex x 10 reps both directions  --alternate toe taps to 6 inch step wearing 2x 20 reps  --Column walkouts with 5# x 3 reps in each direction with side stepping to the right more challenging.  Pt. Used her [place]os[place] cane.     Reviewed Current HEP:   Supine quad set + SLR   Standing hip ABD   Bridges- legs on bolster   LAQ with 3# ankle weight or TB  Resisted HS curls with blue TB    Mini squats   Lateral & AP/staggered weight shifting  Side stepping with countertop  Seated hip ABD/ER with TB   Access Code: [email or web address]     Access Code: [email or web address]  URL: [email or web address]  Date: 03/24/2025  Prepared by: Deisy Zazueta    Exercises  - Active Straight Leg Raise with Quad Set (Mirrored)  - 1 x daily - 7 x weekly - 2-3 sets - 10 reps - 5 seconds hold  - Standing Hip Abduction (Mirrored)  - 2 x daily - 7 x weekly - 3 sets - 10 reps - 2 sec hold  - Sitting Knee Extension with Resistance (Mirrored)  - 1 x daily - 7 x weekly - 3 sets - 10 reps - 1-2 seconds hold  - Seated Hamstring Curl with Anchored Resistance  - 1 x daily - 7 x weekly - 3 sets - 10 reps - 1-2 seconds hold  - Mini Squat with Counter Support  - 1-2 x daily - 7 x weekly - 3 sets - 10 reps - 1-2 seconds hold  - Side Stepping with Counter Support  - 2 x daily - 7 x weekly - 1 sets - 1 reps - 2 minute bout hold  - Side Plank on Knees  - 1 x daily - 7 x weekly - 1 sets - 10 reps - 5 seconds hold  - Seated Hip Abduction with Resistance  - 1 x daily - 7 x weekly - 3 sets - 10 reps - 5-20 seconds hold    Goals:  By discharge, pt will " meet the following new goals established on 2/10/25:     Pt will perform DGI at or above cutoff for falls risk assessment.--PARTIALLY MET  Pt will ambulate 350ft independently with minimal gait deviations.--PARTIALLY MET  Pt will improve strength of B hip ABD to at least 4/5.--PARTIALLY MET

## 2025-05-09 ENCOUNTER — APPOINTMENT (OUTPATIENT)
Dept: PHYSICAL THERAPY | Facility: CLINIC | Age: 80
End: 2025-05-09
Payer: MEDICARE

## 2025-05-09 DIAGNOSIS — M25.472 EDEMA OF LEFT ANKLE: ICD-10-CM

## 2025-05-09 DIAGNOSIS — R26.2 DIFFICULTY WALKING: ICD-10-CM

## 2025-05-09 DIAGNOSIS — M25.672 ANKLE STIFFNESS, LEFT: Primary | ICD-10-CM

## 2025-05-09 PROCEDURE — 97112 NEUROMUSCULAR REEDUCATION: CPT | Mod: GP

## 2025-05-09 ASSESSMENT — PAIN - FUNCTIONAL ASSESSMENT: PAIN_FUNCTIONAL_ASSESSMENT: 0-10

## 2025-05-09 ASSESSMENT — PAIN SCALES - GENERAL: PAINLEVEL_OUTOF10: 0 - NO PAIN

## 2025-05-11 NOTE — PROGRESS NOTES
Physical Therapy    Physical Therapy Treatment    Patient Name: Zena Baxter  MRN: [id number]  Today's Date: 5/12/2025    Time Entry:   Time Calculation  Start Time: 1341  Stop Time: 1415  Time Calculation (min): 34 min     PT Therapeutic Procedures Time Entry  Neuromuscular Re-Education Time Entry: 34                 Visit #39 (9 of 10 new)   2024  90 DAY 5/11/2025   21th visit in 2025    Assessment:   Pt tolerated session well today without adverse effect. Pt continues to have difficulty with lateral stepping without SPC, especially leading with L LE due to R LE in stance. Noted pt to have frequent anterior LOB as well as cross over stepping. Worked on ankle strategy at end of session to address excessive hip strategy with fwd LOB. Pt will continue to benefit from PT to address her functional mobility and balance.     Plan:   Re-assessment next visit.  Hip strengthening and DGI tasks & walking without device.   As able:   Work on AP weight shifting control.   Bridges with TB at knees   Cable column walkouts with SPC  Harnessed:   Stepping over sticks fwd & lateral   Practice tighter turns   Hurdles   Stepping strategy   Balloon volley at slower speeds to challenge static balance with head/arm movement    Current Problem  1. Ankle stiffness, left        2. Difficulty walking        3. Edema of left ankle          General   Pt states she worked outside & inside lifting, carrying, pulling and therefore is sore. Took 2 Tylenol before session today. Reports her R hip is sore from the yardwork.    Subjective    Precautions  Precautions  STEADI Fall Risk Score (The score of 4 or more indicates an increased risk of falling): 9    Pain Assessment  Pain Assessment: 0-10  0-10 (Numeric) Pain Score: 0 - No pain    Objective      Treatments:  Neuromuscular Re-education (19706): 34 minutes  Harnessed:   --Side stepping with SPC x2 laps. Added green bungee cord resistance and performed x2 laps with cues to increase speed and  "neutral foot positioning.   --Side stepping over 2\" sticks: x2 laps with SPC then x1-2 laps without cane  --Stepping fwd over 2\" sticks without cane x3 laps, however significant difficulty with frequent LOB when leading with L LE fwd, due to R LE weakness without SPC  --Ankle strategy: pt maintained balance against light AP perturbations of therapist. Cues for neutral posture x10    NOT TODAY:  --alternating step ups on 4 inch step 2x 20  --lateral stepping onto airex x 10 reps both directions  --alternate toe taps to 6 inch step wearing 2x 20 reps  --Column walkouts with 5# x 3 reps in each direction with side stepping to the right more challenging.  Pt. Used her hurry cane.     Reviewed Current HEP:   Supine quad set + SLR   Standing hip ABD   Bridges- legs on bolster   LAQ with 3# ankle weight or TB  Resisted HS curls with blue TB    Mini squats   Lateral & AP/staggered weight shifting  Side stepping with countertop  Seated hip ABD/ER with TB   Access Code: [email or web address]     Access Code: [email or web address]  URL: [email or web address]  Date: 03/24/2025  Prepared by: Deisy Zazueta    Exercises  - Active Straight Leg Raise with Quad Set (Mirrored)  - 1 x daily - 7 x weekly - 2-3 sets - 10 reps - 5 seconds hold  - Standing Hip Abduction (Mirrored)  - 2 x daily - 7 x weekly - 3 sets - 10 reps - 2 sec hold  - Sitting Knee Extension with Resistance (Mirrored)  - 1 x daily - 7 x weekly - 3 sets - 10 reps - 1-2 seconds hold  - Seated Hamstring Curl with Anchored Resistance  - 1 x daily - 7 x weekly - 3 sets - 10 reps - 1-2 seconds hold  - Mini Squat with Counter Support  - 1-2 x daily - 7 x weekly - 3 sets - 10 reps - 1-2 seconds hold  - Side Stepping with Counter Support  - 2 x daily - 7 x weekly - 1 sets - 1 reps - 2 minute bout hold  - Side Plank on Knees  - 1 x daily - 7 x weekly - 1 sets - 10 reps - 5 seconds hold  - Seated Hip Abduction with Resistance  - 1 x daily - 7 x weekly - 3 sets - 10 reps - 5-20 " seconds hold    Goals:  By discharge, pt will meet the following new goals established on 2/10/25:     Pt will perform DGI at or above cutoff for falls risk assessment.--PARTIALLY MET  Pt will ambulate 350ft independently with minimal gait deviations.--PARTIALLY MET  Pt will improve strength of B hip ABD to at least 4/5.--PARTIALLY MET

## 2025-05-12 ENCOUNTER — APPOINTMENT (OUTPATIENT)
Dept: PHYSICAL THERAPY | Facility: CLINIC | Age: 80
End: 2025-05-12
Payer: MEDICARE

## 2025-05-12 DIAGNOSIS — M25.672 ANKLE STIFFNESS, LEFT: Primary | ICD-10-CM

## 2025-05-12 DIAGNOSIS — R26.2 DIFFICULTY WALKING: ICD-10-CM

## 2025-05-12 DIAGNOSIS — M25.472 EDEMA OF LEFT ANKLE: ICD-10-CM

## 2025-05-12 PROCEDURE — 97112 NEUROMUSCULAR REEDUCATION: CPT | Mod: GP

## 2025-05-12 ASSESSMENT — PAIN - FUNCTIONAL ASSESSMENT: PAIN_FUNCTIONAL_ASSESSMENT: 0-10

## 2025-05-12 ASSESSMENT — PAIN SCALES - GENERAL: PAINLEVEL_OUTOF10: 0 - NO PAIN

## 2025-05-15 NOTE — PROGRESS NOTES
"Physical Therapy    Physical Therapy Progress Note    Patient Name: Zena Baxter  MRN: [id number]  Today's Date: 5/16/2025    Time Entry:   Time Calculation  Start Time: 1354  Stop Time: 1433  Time Calculation (min): 39 min     PT Therapeutic Procedures Time Entry  Therapeutic Exercise Time Entry: 39                 Visit #40 (10 of 10 new) RE-ASSESSMENT  2024  90 DAY 5/11/2025   21th visit in 2025    Assessment:   Pt has not met further goals since last visit, however has made some progress. Pt's DGI score improved by 3 points since last re-assessment, despite feeling \"very wobbly\" today. She continues to demo weakness of B hip ABD with excessive compensated Trendelenburg and cross over stepping, and continues to require assistance of at least a SPC for stability. Pt will continue to benefit from PT, working on remaining goals at 2x/week frequency for 5 more weeks. Pt in agreement.     Goals:  By discharge, pt will meet the following new goals established on 2/10/25:     Pt will perform DGI at or above cutoff for falls risk assessment.--PARTIALLY MET  Pt will ambulate 350ft independently with minimal gait deviations.--PARTIALLY MET  Pt will improve strength of B hip ABD to at least 4/5.--PARTIALLY MET    Plan:   Continue PT at 2x/week frequency for 5 more weeks, working toward remaining goals.     Hip strengthening and DGI tasks & walking without device.   As able:   Work on AP weight shifting control.   Bridges with TB at knees   Cable column walkouts with SPC  Harnessed:   Stepping over sticks fwd & lateral   Practice tighter turns   Hurdles   Stepping strategy   Balloon volley at slower speeds to challenge static balance with head/arm movement    Current Problem  1. Ankle stiffness, left        2. Difficulty walking        3. Edema of left ankle          General   Pt states she feels \"very wobbly\" today. She reports she did not sleep well last night, and as not not slept well the last 3-4 nights. Also has had " 2 ocular migraines in the last week.   Has been working in the yard. PCP aware of sleep quality.   Had 1 new fall since last visit which occurred when trying to water plants, lost her balance and hit her head on the outdoor sectional. No LOC. Was walking on pavers and was a slight threshold there. Was able to get herself up from the ground independently.  Does     Subjective    Precautions  Precautions  STEADI Fall Risk Score (The score of 4 or more indicates an increased risk of falling): 9    Pain Assessment  Pain Assessment: 0-10  0-10 (Numeric) Pain Score: 0 - No pain    Objective   Other Measures  Dynamic Gait Index (DGI): 12/24    Treatments:  Therapeutic Exercise (87144): 39 minutes  Objective measures for re-assessment, as above.    Reviewed Current HEP:   Supine quad set + SLR   Standing hip ABD   Bridges- legs on bolster   LAQ with 3# ankle weight or TB  Resisted HS curls with blue TB    Mini squats   Lateral & AP/staggered weight shifting  Side stepping with countertop  Seated hip ABD/ER with TB   Access Code: [email or web address]     Access Code: [email or web address]  URL: [email or web address]/  Date: 03/24/2025  Prepared by: Deisy Zazueta    Exercises  - Active Straight Leg Raise with Quad Set (Mirrored)  - 1 x daily - 7 x weekly - 2-3 sets - 10 reps - 5 seconds hold  - Standing Hip Abduction (Mirrored)  - 2 x daily - 7 x weekly - 3 sets - 10 reps - 2 sec hold  - Sitting Knee Extension with Resistance (Mirrored)  - 1 x daily - 7 x weekly - 3 sets - 10 reps - 1-2 seconds hold  - Seated Hamstring Curl with Anchored Resistance  - 1 x daily - 7 x weekly - 3 sets - 10 reps - 1-2 seconds hold  - Mini Squat with Counter Support  - 1-2 x daily - 7 x weekly - 3 sets - 10 reps - 1-2 seconds hold  - Side Stepping with Counter Support  - 2 x daily - 7 x weekly - 1 sets - 1 reps - 2 minute bout hold  - Side Plank on Knees  - 1 x daily - 7 x weekly - 1 sets - 10 reps - 5 seconds hold  - Seated Hip Abduction with  Resistance  - 1 x daily - 7 x weekly - 3 sets - 10 reps - 5-20 seconds hold

## 2025-05-16 ENCOUNTER — APPOINTMENT (OUTPATIENT)
Dept: PHYSICAL THERAPY | Facility: CLINIC | Age: 80
End: 2025-05-16
Payer: MEDICARE

## 2025-05-16 DIAGNOSIS — R26.2 DIFFICULTY WALKING: ICD-10-CM

## 2025-05-16 DIAGNOSIS — M25.472 EDEMA OF LEFT ANKLE: ICD-10-CM

## 2025-05-16 DIAGNOSIS — M25.672 ANKLE STIFFNESS, LEFT: Primary | ICD-10-CM

## 2025-05-16 PROCEDURE — 97110 THERAPEUTIC EXERCISES: CPT | Mod: GP

## 2025-05-16 ASSESSMENT — PAIN SCALES - GENERAL: PAINLEVEL_OUTOF10: 0 - NO PAIN

## 2025-05-16 ASSESSMENT — PAIN - FUNCTIONAL ASSESSMENT: PAIN_FUNCTIONAL_ASSESSMENT: 0-10

## 2025-05-16 NOTE — LETTER
May 16, 2025    [place]  [address] [address] [place] [address]    Patient: Zena Baxter   YOB: 1945   Date of Visit: 5/16/2025       Dear Uma Wisdom PA-C  [address] [place] [address]    The attached plan of care is being sent to you because your patient’s medical reimbursement requires that you certify the plan of care. Your signature is required to allow uninterrupted insurance coverage.      You may indicate your approval by signing below and faxing this form back to us at Dept Fax: [phone].    Please call Dept: [phone] with any questions or concerns.    Thank you for this referral,        Deisy Zazueta, PT  [facility] [address] [facility]  [address] [facility] [address]    Payer: Payor: MEDICARE / Plan: MEDICARE PART A AND B / Product Type: *No Product type* /                                                                         Date:     Dear Deisy Zazueta, PT,     Re: Ms. Zena Baxter, MRN:[id number]    I certify that I have reviewed the attached plan of care and it is medically necessary for Ms. Zena Baxter (1945) who is under my care.          ______________________________________                    _________________  Provider name and credentials                                           Date and time                                                                                           Plan of Care 5/16/25   Effective from: 5/16/2025  Effective to: 8/14/2025    Plan ID: [id number]            Participants as of Finalize on 5/16/2025    Name Type Comments Contact Info    Deisy Zazueta PT Physical Therapist  [phone]    Leland Blake MD Consulting Physician Pt with  insurance. Please sign to re-certify PT POC. [phone]       Last Plan Note     Author: Deisy Zazueta PT Status: Sign when Signing Visit Last edited: 5/16/2025  1:45 PM       Physical Therapy    Physical Therapy Progress Note    Patient  "Name: Zena Baxter  MRN: [id number]  Today's Date: 5/16/2025    Time Entry:   Time Calculation  Start Time: 1354  Stop Time: 1433  Time Calculation (min): 39 min     PT Therapeutic Procedures Time Entry  Therapeutic Exercise Time Entry: 39                 Visit #40 (10 of 10 new) RE-ASSESSMENT  2024  90 DAY 5/11/2025   21th visit in 2025    Assessment:   Pt has not met further goals since last visit, however has made some progress. Pt's DGI score improved by 3 points since last re-assessment, despite feeling \"very wobbly\" today. She continues to demo weakness of B hip ABD with excessive compensated Trendelenburg and cross over stepping, and continues to require assistance of at least a SPC for stability. Pt will continue to benefit from PT, working on remaining goals at 2x/week frequency for 5 more weeks. Pt in agreement.     Goals:  By discharge, pt will meet the following new goals established on 2/10/25:     Pt will perform DGI at or above cutoff for falls risk assessment.--PARTIALLY MET  Pt will ambulate 350ft independently with minimal gait deviations.--PARTIALLY MET  Pt will improve strength of B hip ABD to at least 4/5.--PARTIALLY MET    Plan:   Continue PT at 2x/week frequency for 5 more weeks, working toward remaining goals.     Hip strengthening and DGI tasks & walking without device.   As able:   Work on AP weight shifting control.   Bridges with TB at knees   Cable column walkouts with SPC  Harnessed:   Stepping over sticks fwd & lateral   Practice tighter turns   Hurdles   Stepping strategy   Balloon volley at slower speeds to challenge static balance with head/arm movement    Current Problem  1. Ankle stiffness, left        2. Difficulty walking        3. Edema of left ankle          General   Pt states she feels \"very wobbly\" today. She reports she did not sleep well last night, and as not not slept well the last 3-4 nights. Also has had 2 ocular migraines in the last week.   Has been working in " the yard. PCP aware of sleep quality.   Had 1 new fall since last visit which occurred when trying to water plants, lost her balance and hit her head on the outdoor sectional. No LOC. Was walking on pavers and was a slight threshold there. Was able to get herself up from the ground independently.  Does     Subjective    Precautions  Precautions  STEADI Fall Risk Score (The score of 4 or more indicates an increased risk of falling): 9    Pain Assessment  Pain Assessment: 0-10  0-10 (Numeric) Pain Score: 0 - No pain    Objective   Other Measures  Dynamic Gait Index (DGI): 12/24    Treatments:  Therapeutic Exercise (36474): 39 minutes  Objective measures for re-assessment, as above.    Reviewed Current HEP:   Supine quad set + SLR   Standing hip ABD   Bridges- legs on bolster   LAQ with 3# ankle weight or TB  Resisted HS curls with blue TB    Mini squats   Lateral & AP/staggered weight shifting  Side stepping with countertop  Seated hip ABD/ER with TB   Access Code: [email or web address]     Access Code: [email or web address]  URL: [email or web address]  Date: 03/24/2025  Prepared by: Deisy Zazueta    Exercises  - Active Straight Leg Raise with Quad Set (Mirrored)  - 1 x daily - 7 x weekly - 2-3 sets - 10 reps - 5 seconds hold  - Standing Hip Abduction (Mirrored)  - 2 x daily - 7 x weekly - 3 sets - 10 reps - 2 sec hold  - Sitting Knee Extension with Resistance (Mirrored)  - 1 x daily - 7 x weekly - 3 sets - 10 reps - 1-2 seconds hold  - Seated Hamstring Curl with Anchored Resistance  - 1 x daily - 7 x weekly - 3 sets - 10 reps - 1-2 seconds hold  - Mini Squat with Counter Support  - 1-2 x daily - 7 x weekly - 3 sets - 10 reps - 1-2 seconds hold  - Side Stepping with Counter Support  - 2 x daily - 7 x weekly - 1 sets - 1 reps - 2 minute bout hold  - Side Plank on Knees  - 1 x daily - 7 x weekly - 1 sets - 10 reps - 5 seconds hold  - Seated Hip Abduction with Resistance  - 1 x daily - 7 x weekly - 3 sets - 10 reps - 5-20  seconds hold           Current Participants as of 5/16/2025    Name Type Comments Contact Info    Deisy Zazueta, PT Physical Therapist  [phone]    Electronically signed by Deisy Zazueta PT at 5/16/2025 5694 EDT    Leland Blake MD Consulting Physician Pt with  insurance. Please sign to re-certify PT POC. [phone]

## 2025-05-19 ENCOUNTER — APPOINTMENT (OUTPATIENT)
Dept: PHYSICAL THERAPY | Facility: CLINIC | Age: 80
End: 2025-05-19
Payer: MEDICARE

## 2025-05-19 DIAGNOSIS — R26.2 DIFFICULTY WALKING: ICD-10-CM

## 2025-05-19 DIAGNOSIS — M25.472 EDEMA OF LEFT ANKLE: ICD-10-CM

## 2025-05-19 DIAGNOSIS — M25.672 ANKLE STIFFNESS, LEFT: Primary | ICD-10-CM

## 2025-05-19 PROCEDURE — 97112 NEUROMUSCULAR REEDUCATION: CPT | Mod: GP,KX,CQ

## 2025-05-19 NOTE — PROGRESS NOTES
"Physical Therapy    Physical Therapy Treatment    Patient Name: Zena Baxter  MRN: [id number]  Today's Date: 5/19/2025    Time Entry:   Time Calculation  Start Time: 0950  Stop Time: 1030  Time Calculation (min): 40 min     PT Therapeutic Procedures Time Entry  Neuromuscular Re-Education Time Entry: 40                 Visit #41 (1 of 10 new)   2024  90 DAY 5/11/2025   22nd  visit in 2025    Assessment:   Pt tolerated session well today.  Spent majority of session working on hip strengthening. She reported leg fatigue and feeling \"wobbly\" post session.  Pt will continue to benefit from PT to address her functional mobility and balance.     Goals:  By discharge, pt will meet the following new goals established on 2/10/25:     Pt will perform DGI at or above cutoff for falls risk assessment.--PARTIALLY MET  Pt will ambulate 350ft independently with minimal gait deviations.--PARTIALLY MET  Pt will improve strength of B hip ABD to at least 4/5.--PARTIALLY MET    Plan:   Continue POC.    Hip strengthening and DGI tasks & walking without device.   As able:   Work on AP weight shifting control.   Bridges with TB at knees   Cable column walkouts with SPC  Harnessed:   Stepping over sticks fwd & lateral   Practice tighter turns   Hurdles   Stepping strategy   Balloon volley at slower speeds to challenge static balance with head/arm movement    Current Problem  1. Ankle stiffness, left        2. Difficulty walking        3. Edema of left ankle          General   Pt states she took her water pill and needs to use the bathroom more frequently this morning but otherwise nothing new since last visit.    Subjective    Precautions   Precautions  STEADI Fall Risk Score (The score of 4 or more indicates an increased risk of falling): 9         Objective        Treatments:  Neuromuscular Re-education (70887):  40 minutes  In // bars:  --Side stepping with SPC x2 laps. Added green bungee cord resistance and performed x4 laps with " "cues for more upright posture  --Side stepping over 2\" sticks: x2 laps with SPC then x2 laps without cane wearing 2# ankle weights  --Stepping fwd over 2\" sticks with cane x 6 laps, wearing 2# ankle weights  --toe taps to 6 inch riser x 20 reps wearing 2# ankle weights.  --seated LAQ's 2 # x 20 reps bilat.     Current HEP:   Supine quad set + SLR   Standing hip ABD   Bridges- legs on bolster   LAQ with 3# ankle weight or TB  Resisted HS curls with blue TB    Mini squats   Lateral & AP/staggered weight shifting  Side stepping with countertop  Seated hip ABD/ER with TB   Access Code: [email or web address]     Access Code: [email or web address]  URL: [email or web address]/  Date: 03/24/2025  Prepared by: Deisy Zazueta    Exercises  - Active Straight Leg Raise with Quad Set (Mirrored)  - 1 x daily - 7 x weekly - 2-3 sets - 10 reps - 5 seconds hold  - Standing Hip Abduction (Mirrored)  - 2 x daily - 7 x weekly - 3 sets - 10 reps - 2 sec hold  - Sitting Knee Extension with Resistance (Mirrored)  - 1 x daily - 7 x weekly - 3 sets - 10 reps - 1-2 seconds hold  - Seated Hamstring Curl with Anchored Resistance  - 1 x daily - 7 x weekly - 3 sets - 10 reps - 1-2 seconds hold  - Mini Squat with Counter Support  - 1-2 x daily - 7 x weekly - 3 sets - 10 reps - 1-2 seconds hold  - Side Stepping with Counter Support  - 2 x daily - 7 x weekly - 1 sets - 1 reps - 2 minute bout hold  - Side Plank on Knees  - 1 x daily - 7 x weekly - 1 sets - 10 reps - 5 seconds hold  - Seated Hip Abduction with Resistance  - 1 x daily - 7 x weekly - 3 sets - 10 reps - 5-20 seconds hold    "

## 2025-05-21 ENCOUNTER — APPOINTMENT (OUTPATIENT)
Dept: PHYSICAL THERAPY | Facility: CLINIC | Age: 80
End: 2025-05-21
Payer: MEDICARE

## 2025-05-21 DIAGNOSIS — M25.672 ANKLE STIFFNESS, LEFT: Primary | ICD-10-CM

## 2025-05-21 DIAGNOSIS — R26.2 DIFFICULTY WALKING: ICD-10-CM

## 2025-05-21 DIAGNOSIS — M25.472 EDEMA OF LEFT ANKLE: ICD-10-CM

## 2025-05-21 PROCEDURE — 97112 NEUROMUSCULAR REEDUCATION: CPT | Mod: GP,KX,CQ

## 2025-05-21 NOTE — PROGRESS NOTES
Physical Therapy    Physical Therapy Treatment    Patient Name: Zena Baxter  MRN: [id number]  Today's Date: 5/21/2025    Time Entry:   Time Calculation  Start Time: 1440  Stop Time: 1530  Time Calculation (min): 50 min     PT Therapeutic Procedures Time Entry  Neuromuscular Re-Education Time Entry: 40  Therapeutic Exercise Time Entry: 10                 Visit #42 (2 of 10 new)   2024  90 DAY 5/11/2025 23 rd  visit in 2025    Assessment:   Pt tolerated session well and is making progress towards goals.  Pt. Benefits from hip strengthening exercises and is challenged with hip abduction.   Able to walk a few laps within // bars without cane and did fairly well considering it was at the end of session.  Pt will continue to benefit from PT to address her functional mobility and balance.     Goals:  By discharge, pt will meet the following new goals established on 2/10/25:     Pt will perform DGI at or above cutoff for falls risk assessment.--PARTIALLY MET  Pt will ambulate 350ft independently with minimal gait deviations.--PARTIALLY MET  Pt will improve strength of B hip ABD to at least 4/5.--PARTIALLY MET    Plan:   Continue POC.    Hip strengthening and DGI tasks & walking without device.   As able:   Work on AP weight shifting control.   Bridges with TB at knees   Cable column walkouts with SPC  Harnessed:   Stepping over sticks fwd & lateral   Practice tighter turns   Hurdles   Stepping strategy   Balloon volley at slower speeds to challenge static balance with head/arm movement    Current Problem  1. Ankle stiffness, left        2. Difficulty walking        3. Edema of left ankle          General   Pt states she only slept 3 hours 19 minutes last night.    Subjective    Precautions   Precautions  STEADI Fall Risk Score (The score of 4 or more indicates an increased risk of falling): 9         Objective        Treatments:  Neuromuscular Re-education (29771): 40   minutes  In // bars:  --Side stepping with SPC   "with green bungee cord resistance and performed x4 laps with cues for more upright posture  --forward walking with green bungee around the waist for resisted walking.  --Side stepping over 2\" sticks: x 6  laps without cane wearing 2# ankle weights with UE support on // bar  --Stepping fwd over 2\" sticks with intermittent UE support on // bar x 6 laps, wearing 2# ankle weights  --walking within // bars without UE support x 6 laps  Therapeutic Exercise (87559): 10 minutes  --seated LAQ's 2 # x 20 reps bilat.  --standing hip abduction  with 2# ankle weights 2 x 10 reps bilat     Current HEP:   Supine quad set + SLR   Standing hip ABD   Bridges- legs on bolster   LAQ with 3# ankle weight or TB  Resisted HS curls with blue TB    Mini squats   Lateral & AP/staggered weight shifting  Side stepping with countertop  Seated hip ABD/ER with TB   Access Code: [email or web address]     Access Code: [email or web address]  URL: [email or web address]  Date: 03/24/2025  Prepared by: Deisy Zazueta    Exercises  - Active Straight Leg Raise with Quad Set (Mirrored)  - 1 x daily - 7 x weekly - 2-3 sets - 10 reps - 5 seconds hold  - Standing Hip Abduction (Mirrored)  - 2 x daily - 7 x weekly - 3 sets - 10 reps - 2 sec hold  - Sitting Knee Extension with Resistance (Mirrored)  - 1 x daily - 7 x weekly - 3 sets - 10 reps - 1-2 seconds hold  - Seated Hamstring Curl with Anchored Resistance  - 1 x daily - 7 x weekly - 3 sets - 10 reps - 1-2 seconds hold  - Mini Squat with Counter Support  - 1-2 x daily - 7 x weekly - 3 sets - 10 reps - 1-2 seconds hold  - Side Stepping with Counter Support  - 2 x daily - 7 x weekly - 1 sets - 1 reps - 2 minute bout hold  - Side Plank on Knees  - 1 x daily - 7 x weekly - 1 sets - 10 reps - 5 seconds hold  - Seated Hip Abduction with Resistance  - 1 x daily - 7 x weekly - 3 sets - 10 reps - 5-20 seconds hold    "

## 2025-06-04 ENCOUNTER — APPOINTMENT (OUTPATIENT)
Dept: PHYSICAL THERAPY | Facility: CLINIC | Age: 80
End: 2025-06-04
Payer: MEDICARE

## 2025-06-04 DIAGNOSIS — M25.472 EDEMA OF LEFT ANKLE: ICD-10-CM

## 2025-06-04 DIAGNOSIS — R26.2 DIFFICULTY WALKING: ICD-10-CM

## 2025-06-04 DIAGNOSIS — M25.672 ANKLE STIFFNESS, LEFT: Primary | ICD-10-CM

## 2025-06-04 PROCEDURE — 97112 NEUROMUSCULAR REEDUCATION: CPT | Mod: GP

## 2025-06-04 ASSESSMENT — PAIN - FUNCTIONAL ASSESSMENT: PAIN_FUNCTIONAL_ASSESSMENT: 0-10

## 2025-06-04 ASSESSMENT — PAIN SCALES - GENERAL: PAINLEVEL_OUTOF10: 0 - NO PAIN

## 2025-06-06 ENCOUNTER — APPOINTMENT (OUTPATIENT)
Dept: PHYSICAL THERAPY | Facility: CLINIC | Age: 80
End: 2025-06-06
Payer: MEDICARE

## 2025-06-06 DIAGNOSIS — M25.672 ANKLE STIFFNESS, LEFT: Primary | ICD-10-CM

## 2025-06-06 DIAGNOSIS — R26.2 DIFFICULTY WALKING: ICD-10-CM

## 2025-06-06 DIAGNOSIS — M25.472 EDEMA OF LEFT ANKLE: ICD-10-CM

## 2025-06-06 PROCEDURE — 97112 NEUROMUSCULAR REEDUCATION: CPT | Mod: GP,KX,CQ

## 2025-06-06 NOTE — PROGRESS NOTES
"Physical Therapy    Physical Therapy Treatment    Patient Name: Zena Baxter  MRN: [id number]  Today's Date: 6/7/2025    Time Entry:   Time Calculation  Start Time: 1438  Stop Time: 1516  Time Calculation (min): 38 min     PT Therapeutic Procedures Time Entry  Neuromuscular Re-Education Time Entry: 38                 Visit #44 (4 of 10 new)   2024  90 DAY 8/14/2025   24th visit in 2025    Assessment:   Pt tolerated session well today.  She had improved sleep last night, she was more aware of her posture during session.  Pt. Able to  self corrected posterior balance loss by taking a step with right leg.  She was fatigued at end of session.   Pt will continue to benefit from PT to address her functional mobility and balance.     Plan:   Continue POC.    Hip strengthening and DGI tasks & walking without device.   Balloon volley at slower speeds to challenge static balance with head/arm movement  Bridges with TB at knees   As able:   Work on AP weight shifting control.   Cable column walkouts with SPC  Harnessed:   Stepping over sticks fwd & lateral   Practice tighter turns   Hurdles   Stepping strategy     Current Problem  1. Ankle stiffness, left        2. Difficulty walking        3. Edema of left ankle          General   Pt states she slept better last night.    Subjective    Precautions   Precautions  STEADI Fall Risk Score (The score of 4 or more indicates an increased risk of falling): 9       Objective      Treatments:  Neuromuscular Re-education (99092): 38 minutes  In harness system:  --forward and retro walking multiple laps   --Side stepping with SPC x 4 laps and then added green bungee cord for resistance  --lateral stepping over the sticks with good technique, improved posture, using SPC  --walking forward over 2 inch sticks reciprocally using cane  --Walking fwd with 2\" sticks between feet several laps  --ended session with mini squats x 15 reps     Current HEP:   Supine quad set + SLR   Standing hip " ABD   Bridges- legs on bolster   LAQ with 3# ankle weight or TB  Resisted HS curls with blue TB    Mini squats   Lateral & AP/staggered weight shifting  Side stepping with countertop  Seated hip ABD/ER with TB   Access Code: [email or web address]     Access Code: [email or web address]  URL: [email or web address]  Date: 03/24/2025  Prepared by: Deisy Zazueta    Exercises  - Active Straight Leg Raise with Quad Set (Mirrored)  - 1 x daily - 7 x weekly - 2-3 sets - 10 reps - 5 seconds hold  - Standing Hip Abduction (Mirrored)  - 2 x daily - 7 x weekly - 3 sets - 10 reps - 2 sec hold  - Sitting Knee Extension with Resistance (Mirrored)  - 1 x daily - 7 x weekly - 3 sets - 10 reps - 1-2 seconds hold  - Seated Hamstring Curl with Anchored Resistance  - 1 x daily - 7 x weekly - 3 sets - 10 reps - 1-2 seconds hold  - Mini Squat with Counter Support  - 1-2 x daily - 7 x weekly - 3 sets - 10 reps - 1-2 seconds hold  - Side Stepping with Counter Support  - 2 x daily - 7 x weekly - 1 sets - 1 reps - 2 minute bout hold  - Side Plank on Knees  - 1 x daily - 7 x weekly - 1 sets - 10 reps - 5 seconds hold  - Seated Hip Abduction with Resistance  - 1 x daily - 7 x weekly - 3 sets - 10 reps - 5-20 seconds hold      Goals:  By discharge, pt will meet the following new goals established on 2/10/25:     Pt will perform DGI at or above cutoff for falls risk assessment.--PARTIALLY MET  Pt will ambulate 350ft independently with minimal gait deviations.--PARTIALLY MET  Pt will improve strength of B hip ABD to at least 4/5.--PARTIALLY MET

## 2025-06-09 ENCOUNTER — APPOINTMENT (OUTPATIENT)
Dept: PHYSICAL THERAPY | Facility: CLINIC | Age: 80
End: 2025-06-09
Payer: MEDICARE

## 2025-06-09 DIAGNOSIS — R26.2 DIFFICULTY WALKING: ICD-10-CM

## 2025-06-09 DIAGNOSIS — M25.672 ANKLE STIFFNESS, LEFT: Primary | ICD-10-CM

## 2025-06-09 DIAGNOSIS — M25.472 EDEMA OF LEFT ANKLE: ICD-10-CM

## 2025-06-09 PROCEDURE — 97112 NEUROMUSCULAR REEDUCATION: CPT | Mod: GP,KX

## 2025-06-09 ASSESSMENT — PAIN SCALES - GENERAL: PAINLEVEL_OUTOF10: 0 - NO PAIN

## 2025-06-09 ASSESSMENT — PAIN - FUNCTIONAL ASSESSMENT: PAIN_FUNCTIONAL_ASSESSMENT: 0-10

## 2025-06-09 NOTE — PROGRESS NOTES
"Physical Therapy    Physical Therapy Treatment    Patient Name: Zena Baxter  MRN: [id number]  Today's Date: 6/9/2025    Time Entry:   Time Calculation  Start Time: 1433  Stop Time: 1500  Time Calculation (min): 27 min     PT Therapeutic Procedures Time Entry  Neuromuscular Re-Education Time Entry: 27                 Visit #45 (5 of 10 new)   2024  90 DAY 8/14/2025   23rd visit in 2025    Assessment:   Pt tolerated session well and is making slow progress towards goals. Session limited secondary to late arrival of pt. Worked in // bars due to lack of harness availability. Pt will continue to benefit from PT to address her functional mobility and balance.     Plan:   Continue POC.  Eventually work on slip .   Hip strengthening and DGI tasks & walking without device.   Balloon volley at slower speeds to challenge static balance with head/arm movement  Bridges with TB at knees   As able:   Work on AP weight shifting control.   Cable column walkouts with SPC  Harnessed:   Stepping over sticks fwd & lateral   Practice tighter turns   Hurdles   Stepping strategy     Current Problem  1. Ankle stiffness, left        2. Difficulty walking        3. Edema of left ankle          General   Pt apologetic for late arrival to session. She reports she is to have a colonoscopy and endoscopy in 2 days, starts her prep tomorrow. Feels her sleep quality has improved a little bit.     Subjective    Precautions  Precautions  STEADI Fall Risk Score (The score of 4 or more indicates an increased risk of falling): 9  Pain Assessment  Pain Assessment: 0-10  0-10 (Numeric) Pain Score: 0 - No pain    Objective      Treatments:  Neuromuscular Re-education (53742): 27 minutes  In // bars:  --Side stepping with SPC first without then with green bungee cord resistance and performed x4 laps each with cues for more upright posture  --Side stepping over 2\" sticks with SPC  --Fwd stepping over 2\" sticks with SPC  --Walking fwd with 2\" sticks " between feet, initially with SPC then progressing to without device  Comments: multiple laps each     Current HEP:   Supine quad set + SLR   Standing hip ABD   Bridges- legs on bolster   LAQ with 3# ankle weight or TB  Resisted HS curls with blue TB    Mini squats   Lateral & AP/staggered weight shifting  Side stepping with countertop  Seated hip ABD/ER with TB   Access Code: [email or web address]     Access Code: [email or web address]  URL: [email or web address]  Date: 03/24/2025  Prepared by: Deisy Zazueta    Exercises  - Active Straight Leg Raise with Quad Set (Mirrored)  - 1 x daily - 7 x weekly - 2-3 sets - 10 reps - 5 seconds hold  - Standing Hip Abduction (Mirrored)  - 2 x daily - 7 x weekly - 3 sets - 10 reps - 2 sec hold  - Sitting Knee Extension with Resistance (Mirrored)  - 1 x daily - 7 x weekly - 3 sets - 10 reps - 1-2 seconds hold  - Seated Hamstring Curl with Anchored Resistance  - 1 x daily - 7 x weekly - 3 sets - 10 reps - 1-2 seconds hold  - Mini Squat with Counter Support  - 1-2 x daily - 7 x weekly - 3 sets - 10 reps - 1-2 seconds hold  - Side Stepping with Counter Support  - 2 x daily - 7 x weekly - 1 sets - 1 reps - 2 minute bout hold  - Side Plank on Knees  - 1 x daily - 7 x weekly - 1 sets - 10 reps - 5 seconds hold  - Seated Hip Abduction with Resistance  - 1 x daily - 7 x weekly - 3 sets - 10 reps - 5-20 seconds hold      Goals:  By discharge, pt will meet the following new goals established on 2/10/25:     Pt will perform DGI at or above cutoff for falls risk assessment.--PARTIALLY MET  Pt will ambulate 350ft independently with minimal gait deviations.--PARTIALLY MET  Pt will improve strength of B hip ABD to at least 4/5.--PARTIALLY MET     non-verbal indicator of pain/discomfort not present

## 2025-06-13 ENCOUNTER — APPOINTMENT (OUTPATIENT)
Dept: PHYSICAL THERAPY | Facility: CLINIC | Age: 80
End: 2025-06-13
Payer: MEDICARE

## 2025-06-13 DIAGNOSIS — R26.2 DIFFICULTY WALKING: ICD-10-CM

## 2025-06-13 DIAGNOSIS — M25.672 ANKLE STIFFNESS, LEFT: Primary | ICD-10-CM

## 2025-06-13 DIAGNOSIS — M25.472 EDEMA OF LEFT ANKLE: ICD-10-CM

## 2025-06-13 PROCEDURE — 97112 NEUROMUSCULAR REEDUCATION: CPT | Mod: GP,KX,CQ

## 2025-06-13 NOTE — PROGRESS NOTES
"Physical Therapy    Physical Therapy Treatment    Patient Name: Zena Baxter  MRN: [id number]  Today's Date: 6/14/2025    Time Entry:   Time Calculation  Start Time: 1348  Stop Time: 1432  Time Calculation (min): 44 min     PT Therapeutic Procedures Time Entry  Neuromuscular Re-Education Time Entry: 44                 Visit #46 (6 of 10 new)   2024  90 DAY 8/14/2025   24th visit in 2025    Assessment:   Pt tolerated session well and is making slow progress towards goals.   Did self correct posterior balance loss by taking a step using her right LE several times. Pt will continue to benefit from PT to address her functional mobility and balance.     Plan:   Continue POC.  Eventually work on slip .   Hip strengthening and DGI tasks & walking without device.   Balloon volley at slower speeds to challenge static balance with head/arm movement  Bridges with TB at knees   As able:   Work on AP weight shifting control.   Cable column walkouts with SPC  Harnessed:   Stepping over sticks fwd & lateral   Practice tighter turns   Hurdles   Stepping strategy     Current Problem  1. Ankle stiffness, left        2. Difficulty walking        3. Edema of left ankle          General   Pt had her procedures and nothing new to report.  Pt. Reports a fall when she stood up from a bench, turned and slid down to floor.  No injuries.    Subjective    Precautions   Precautions  STEADI Fall Risk Score (The score of 4 or more indicates an increased risk of falling): 9  Pain Assessment  Pain Assessment: 0-10  0-10 (Numeric) Pain Score: 0 - No pain       Objective      Treatments:  Neuromuscular Re-education (40629): 44 minutes  In harness:  --forward and retro walking with SPC multiple laps.  --Side stepping with SPC first without then with green bungee cord resistance and performed x4 laps each with cues for more upright posture  --Side stepping over 2\" sticks with SPC  --Fwd stepping over 2\" sticks with SPC  --Comments: multiple " laps each     Current HEP:   Supine quad set + SLR   Standing hip ABD   Bridges- legs on bolster   LAQ with 3# ankle weight or TB  Resisted HS curls with blue TB    Mini squats   Lateral & AP/staggered weight shifting  Side stepping with countertop  Seated hip ABD/ER with TB   Access Code: [email or web address]     Access Code: [email or web address]  URL: [email or web address]  Date: 03/24/2025  Prepared by: Deisy Zazueta    Exercises  - Active Straight Leg Raise with Quad Set (Mirrored)  - 1 x daily - 7 x weekly - 2-3 sets - 10 reps - 5 seconds hold  - Standing Hip Abduction (Mirrored)  - 2 x daily - 7 x weekly - 3 sets - 10 reps - 2 sec hold  - Sitting Knee Extension with Resistance (Mirrored)  - 1 x daily - 7 x weekly - 3 sets - 10 reps - 1-2 seconds hold  - Seated Hamstring Curl with Anchored Resistance  - 1 x daily - 7 x weekly - 3 sets - 10 reps - 1-2 seconds hold  - Mini Squat with Counter Support  - 1-2 x daily - 7 x weekly - 3 sets - 10 reps - 1-2 seconds hold  - Side Stepping with Counter Support  - 2 x daily - 7 x weekly - 1 sets - 1 reps - 2 minute bout hold  - Side Plank on Knees  - 1 x daily - 7 x weekly - 1 sets - 10 reps - 5 seconds hold  - Seated Hip Abduction with Resistance  - 1 x daily - 7 x weekly - 3 sets - 10 reps - 5-20 seconds hold      Goals:  By discharge, pt will meet the following new goals established on 2/10/25:     Pt will perform DGI at or above cutoff for falls risk assessment.--PARTIALLY MET  Pt will ambulate 350ft independently with minimal gait deviations.--PARTIALLY MET  Pt will improve strength of B hip ABD to at least 4/5.--PARTIALLY MET

## 2025-06-16 ENCOUNTER — APPOINTMENT (OUTPATIENT)
Dept: CARDIOLOGY | Facility: CLINIC | Age: 80
End: 2025-06-16
Payer: MEDICARE

## 2025-06-16 VITALS
OXYGEN SATURATION: 98 % | BODY MASS INDEX: 28.56 KG/M2 | WEIGHT: 182 LBS | HEART RATE: 82 BPM | SYSTOLIC BLOOD PRESSURE: 123 MMHG | DIASTOLIC BLOOD PRESSURE: 79 MMHG | HEIGHT: 67 IN

## 2025-06-16 DIAGNOSIS — I50.31: ICD-10-CM

## 2025-06-16 DIAGNOSIS — I35.0 NONRHEUMATIC AORTIC (VALVE) STENOSIS: ICD-10-CM

## 2025-06-16 DIAGNOSIS — I50.30 STAGE C DIASTOLIC HEART FAILURE: Primary | ICD-10-CM

## 2025-06-16 PROCEDURE — 93000 ELECTROCARDIOGRAM COMPLETE: CPT | Performed by: INTERNAL MEDICINE

## 2025-06-16 PROCEDURE — 1159F MED LIST DOCD IN RCRD: CPT | Performed by: INTERNAL MEDICINE

## 2025-06-16 PROCEDURE — 99205 OFFICE O/P NEW HI 60 MIN: CPT | Performed by: INTERNAL MEDICINE

## 2025-06-16 PROCEDURE — 1160F RVW MEDS BY RX/DR IN RCRD: CPT | Performed by: INTERNAL MEDICINE

## 2025-06-16 PROCEDURE — 1036F TOBACCO NON-USER: CPT | Performed by: INTERNAL MEDICINE

## 2025-06-16 RX ORDER — LOSARTAN POTASSIUM 25 MG/1
25 TABLET ORAL DAILY
COMMUNITY

## 2025-06-16 RX ORDER — SPIRONOLACTONE 25 MG/1
25 TABLET ORAL DAILY
Qty: 30 TABLET | Refills: 11 | Status: SHIPPED | OUTPATIENT
Start: 2025-06-16 | End: 2026-06-16

## 2025-06-16 RX ORDER — BISMUTH SUBSALICYLATE 262 MG
1 TABLET,CHEWABLE ORAL DAILY
COMMUNITY

## 2025-06-16 RX ORDER — METOPROLOL SUCCINATE 25 MG/1
25 TABLET, EXTENDED RELEASE ORAL DAILY
COMMUNITY

## 2025-06-16 RX ORDER — FUROSEMIDE 40 MG/1
40 TABLET ORAL
COMMUNITY

## 2025-06-16 RX ORDER — DAPAGLIFLOZIN 10 MG/1
10 TABLET, FILM COATED ORAL EVERY 24 HOURS
COMMUNITY

## 2025-06-16 NOTE — PROGRESS NOTES
"[facility] Advanced [facility] Failure [facility]  Primary Care Physician: Shaneka Olsen MD  Referring Provider/Cardiologist: former pt of Dr. Mares     Date of Visit: 06/16/2025  2:00 PM EDT  Location of visit: [address]     HPI:   Ms. Baxter is a 79F with a PMHx sig for NHL s/p R-CHOP and XRT, mild aortic stenosis, and concern for stage C diastolic HF/HFpEF who presents to the Advanced Heart Failure clinic to establish care. She is a former pt of Dr. Mares at [facility], who has retired.     She currently denies chest pain, palpitations, shortness of breath, dyspnea on exertion, orthopnea, PND. She does endorse chronic LE edema.     Hospitalizations: Patient denies         PMHx:  NHL s/p R-CHOP and XRT, mild aortic stenosis, stage C diastolic HF/HFpEF    SocHx:  , lives in [facility] tobacco use (1960s), occ glass of wine, uses MJ gummies    FamHx:  Mother with HTN        Current Medications[1]    Allergies[2]      Visit Vitals  /79 (BP Location: Right arm, Patient Position: Sitting)   Pulse 82   Ht 1.702 m (5' 7\")   Wt 82.6 kg (182 lb)   SpO2 98%   BMI 28.51 kg/m²   Smoking Status Former   BSA 1.98 m²        Physical Exam:  On exam Ms. Baxter appears her stated age, is alert and oriented x3, and in no acute distress. Her sclera are anicteric and her oropharynx has moist mucous membranes. Her neck is supple and without thyromegaly. The JVP is ~6 cm of water above the right atrium. Her cardiac exam has regular rhythm, normal S1, S2. No S3/4. There are no murmurs. Her lungs are clear to auscultation bilaterally and there is no dullness to percussion. Her abdomen is soft, nontender with normoactive bowel sounds. There is no HJR. The extremities are warm and with 1+ nonpitting edema. The skin is dry. There is no rash present. The distal pulses are 2+ in all four extremities. Her mood and affect are appropriate for todays encounter.       Cardiac " Labs/Diagnostics:    Lab Results   Component Value Date    CREATININE 0.56 06/17/2024    BUN 9 06/17/2024     06/17/2024    K 4.2 06/17/2024     06/17/2024    CO2 28 06/17/2024        ECG (6/16/25):  Sinus rhythm (HR 77), nonspecific ST-T changes    Echo (7/30/19):  - The left ventricle is normal in size. There is no left ventricular hypertrophy. Left ventricular systolic function is normal. EF = 57 ± 5% (2D biplane) . Presence of stage I diastolic dysfunction.   - The right ventricle is normal in size. Right ventricular systolic function is normal.   - There are no significant valvular abnormalities. No significant valvular flow abnormalities.   - There is no patent foramen ovale as detected by Doppler.   - No evidence of pericardial effusion.   - No evidence of PHTN.       Impression/Plan:  Ms. Baxter is a 79F with a PMHx sig for NHL s/p R-CHOP and XRT, mild aortic stenosis, and concern for stage C diastolic HF/HFpEF who presents to the Advanced Heart Failure clinic to establish care. At the current time she has functional class I symptoms and appears intravascularly euvolemic on exam.     1) Stage C chronic diastolic HF/HFpEF  History of elevated proBNP at [facility]. Prior echos with preserved LV function despite exposure to anthracycline her for NHL treatment. Will update labs and imaging to determine further treatments/adjustments.   -c/w metoprolol succinate 25 mg daily, losartan 25 mg daily, farxiga 10 mg daily  -change furosemide to 40 mg daily/prn  -start spironolactone 25 mg daily  -labs (RFP/BNP)  -will consider stopping metoprolol unless indicated    2) ASCVD risk assessment  -c/w simvastatin 20 mg daily  -check lipids  -check CT calcium score  -pending lipids/CAC score, will adjust statin intensity    3) Mild aortic stenosis  -repeat an echo to assess      F/U: 3 months at /[facility]      ____________________________________________________________  Teodoro Montano DO  Section of Advanced  Heart Failure and Cardiac Transplantation  Division of Cardiovascular Medicine  [facility]  [facility]       [1]   Current Outpatient Medications   Medication Sig Dispense Refill    cholecalciferol (Vitamin D3) 50 MCG (2000 UT) tablet Take 1 tablet (50 mcg) by mouth once daily.      dapagliflozin propanediol (Farxiga) 10 mg tablet Take 1 tablet (10 mg) by mouth once every 24 hours.      fluticasone (Flonase) 50 mcg/actuation nasal spray Administer 2 sprays into each nostril once daily. Shake gently. Before first use, prime pump. After use, clean tip and replace cap.      furosemide (Lasix) 40 mg tablet Take 1 tablet (40 mg) by mouth.      L.acid/L.casei/B.bif/B.martin/FOS (PROBIOTIC BLEND ORAL) Take 1 tablet by mouth once daily.      losartan (Cozaar) 25 mg tablet Take 1 tablet (25 mg) by mouth once daily.      metoprolol succinate XL (Toprol-XL) 25 mg 24 hr tablet Take 1 tablet (25 mg) by mouth once daily. Do not crush or chew.      multivitamin tablet Take 1 tablet by mouth once daily.      omeprazole (PriLOSEC) 20 mg DR capsule Take 1 capsule (20 mg) by mouth once daily.      polyethylene glycol (Glycolax, Miralax) 17 gram packet Take 17 g by mouth once daily as needed (constipation).      sertraline (Zoloft) 50 mg tablet Take 2 tablets (100 mg) by mouth once daily.      simvastatin (Zocor) 20 mg tablet Take 1 tablet (20 mg) by mouth once daily at bedtime.       No current facility-administered medications for this visit.   [2]   Allergies  Allergen Reactions    Enbucrilate Itching and Other     SURGICAL GLUE   Reaction: pustules, erythema, itching  ( placed on patient's surgical incision for mediport insertion)    Nut - Unspecified Swelling     Brazil nuts only, throat swelling    Latex Itching and Rash     Latex challenge positive    Buchtel Rash     Fresh only- perioral rash

## 2025-06-16 NOTE — PATIENT INSTRUCTIONS
"It was a pleasure seeing you today. Please contact myself or my team with any questions.     To reach Dr. Montano' office please call [phone] ([place]).   Fax: [phone]   To schedule an appointment call [phone]     If you have any questions or need cardiac medication refills, please call the Heart Failure office at [phone], option 6. You may also contact the  Heart Failure Nursing team via email at [email or web address] (Please include your name and date of birth).        1) Change furosemide to \"as needed\"  2) Start spironolactone 25 mg once a day  3) Labs (CMP/BNP/lipids)  4) We will do an echocardiogram  5) We will do a ct calcium score; to schedule, call [phone]  6) Follow up in 3 months at /[facility]  "

## 2025-06-18 ENCOUNTER — APPOINTMENT (OUTPATIENT)
Dept: PHYSICAL THERAPY | Facility: CLINIC | Age: 80
End: 2025-06-18
Payer: MEDICARE

## 2025-06-18 DIAGNOSIS — R26.2 DIFFICULTY WALKING: ICD-10-CM

## 2025-06-18 DIAGNOSIS — M25.472 EDEMA OF LEFT ANKLE: ICD-10-CM

## 2025-06-18 DIAGNOSIS — M25.672 ANKLE STIFFNESS, LEFT: Primary | ICD-10-CM

## 2025-06-18 PROCEDURE — 97112 NEUROMUSCULAR REEDUCATION: CPT | Mod: GP,KX,CQ

## 2025-06-18 NOTE — PROGRESS NOTES
Physical Therapy    Physical Therapy Treatment    Patient Name: Zena Baxter  MRN: [id number]  Today's Date: 6/18/2025    Time Entry:   Time Calculation  Start Time: 1335  Stop Time: 1416  Time Calculation (min): 41 min     PT Therapeutic Procedures Time Entry  Neuromuscular Re-Education Time Entry: 41                 Visit #47(7 of 10 new)   2024  90 DAY 8/14/2025   25th visit in 2025    Assessment:   Pt tolerated session well and is making slower progress towards goals.   Pt. Was improved today with her posture and technique with side stepping.  Tolerated the addition of ankle weights  with walking well.    Pt will continue to benefit from PT to address her functional mobility and balance.     Plan:   Continue POC.  Eventually work on slip .   Hip strengthening and DGI tasks & walking without device.   Balloon volley at slower speeds to challenge static balance with head/arm movement  Bridges with TB at knees   As able:   Work on AP weight shifting control.   Cable column walkouts with SPC  Harnessed:   Stepping over sticks fwd & lateral   Practice tighter turns   Hurdles   Stepping strategy     Current Problem  1. Ankle stiffness, left        2. Difficulty walking        3. Edema of left ankle          General   Pt reports that she caught herself in the door frame while bringing boxes down some steps on a cart.  Reports a bruise on left shoulder.  States she met her new cardiologist this past Monday.    Subjective    Precautions   Precautions  STEADI Fall Risk Score (The score of 4 or more indicates an increased risk of falling): 9  Pain Assessment  Pain Assessment: 0-10  0-10 (Numeric) Pain Score: 0 - No pain       Objective      Treatments:  Neuromuscular Re-education (61846): 41 minutes  In harness: Added 1.5# ankle weights bilat.  --forward and retro walking with SPC multiple laps wearing 1.5# ankle weights.  --Side stepping with SPC first with and without  green bungee cord resistance and performed  x4 laps each --improved posture and awareness of where her feet are.  --alternate toe taps to 6 inch step wearing 1.5# ankle weights x 10 reps each,some difficulty on the right LE.       Current HEP:   Supine quad set + SLR   Standing hip ABD   Bridges- legs on bolster   LAQ with 3# ankle weight or TB  Resisted HS curls with blue TB    Mini squats   Lateral & AP/staggered weight shifting  Side stepping with countertop  Seated hip ABD/ER with TB   Access Code: [email or web address]     Access Code: [email or web address]  URL: [email or web address]  Date: 03/24/2025  Prepared by: Deisy Zazueta    Exercises  - Active Straight Leg Raise with Quad Set (Mirrored)  - 1 x daily - 7 x weekly - 2-3 sets - 10 reps - 5 seconds hold  - Standing Hip Abduction (Mirrored)  - 2 x daily - 7 x weekly - 3 sets - 10 reps - 2 sec hold  - Sitting Knee Extension with Resistance (Mirrored)  - 1 x daily - 7 x weekly - 3 sets - 10 reps - 1-2 seconds hold  - Seated Hamstring Curl with Anchored Resistance  - 1 x daily - 7 x weekly - 3 sets - 10 reps - 1-2 seconds hold  - Mini Squat with Counter Support  - 1-2 x daily - 7 x weekly - 3 sets - 10 reps - 1-2 seconds hold  - Side Stepping with Counter Support  - 2 x daily - 7 x weekly - 1 sets - 1 reps - 2 minute bout hold  - Side Plank on Knees  - 1 x daily - 7 x weekly - 1 sets - 10 reps - 5 seconds hold  - Seated Hip Abduction with Resistance  - 1 x daily - 7 x weekly - 3 sets - 10 reps - 5-20 seconds hold      Goals:  By discharge, pt will meet the following new goals established on 2/10/25:     Pt will perform DGI at or above cutoff for falls risk assessment.--PARTIALLY MET  Pt will ambulate 350ft independently with minimal gait deviations.--PARTIALLY MET  Pt will improve strength of B hip ABD to at least 4/5.--PARTIALLY MET

## 2025-06-19 ENCOUNTER — APPOINTMENT (OUTPATIENT)
Dept: RADIOLOGY | Facility: HOSPITAL | Age: 80
End: 2025-06-19
Payer: MEDICARE

## 2025-06-20 ENCOUNTER — APPOINTMENT (OUTPATIENT)
Dept: PHYSICAL THERAPY | Facility: CLINIC | Age: 80
End: 2025-06-20
Payer: MEDICARE

## 2025-06-20 DIAGNOSIS — M25.672 ANKLE STIFFNESS, LEFT: Primary | ICD-10-CM

## 2025-06-20 DIAGNOSIS — R26.2 DIFFICULTY WALKING: ICD-10-CM

## 2025-06-20 DIAGNOSIS — M25.472 EDEMA OF LEFT ANKLE: ICD-10-CM

## 2025-06-20 PROCEDURE — 97110 THERAPEUTIC EXERCISES: CPT | Mod: GP,KX,CQ

## 2025-06-20 PROCEDURE — 97112 NEUROMUSCULAR REEDUCATION: CPT | Mod: GP,KX,CQ

## 2025-06-20 NOTE — PROGRESS NOTES
Physical Therapy    Physical Therapy Treatment    Patient Name: Zena Baxter  MRN: [id number]  Today's Date: 6/20/2025    Time Entry:   Time Calculation  Start Time: 1520  Stop Time: 1605  Time Calculation (min): 45 min     PT Therapeutic Procedures Time Entry  Therapeutic Exercise Time Entry: 20  Neuromuscular Re-Education Time Entry: 25                 Visit #48(8 of 10 new)   2024  90 DAY 8/14/2025 26th visit in 2025    Assessment:   Pt tolerated session well and is making progress towards goals.   Pt. More aware of posture and correct technique with exercises.  Pt. Con't to benefit from hip strengthening.    Pt will continue to benefit from PT to address her functional mobility and balance.     Plan:   Continue POC.  Eventually work on slip .   Hip strengthening and DGI tasks & walking without device.   Balloon volley at slower speeds to challenge static balance with head/arm movement  Bridges with TB at knees   As able:   Work on AP weight shifting control.   Cable column walkouts with SPC  Harnessed:   Stepping over sticks fwd & lateral   Practice tighter turns   Hurdles   Stepping strategy     Current Problem  1. Ankle stiffness, left        2. Difficulty walking        3. Edema of left ankle          General   Pt states she was picking weeds just prior to this session and she was able to maintain her balance.       Subjective    Precautions   Precautions  STEADI Fall Risk Score (The score of 4 or more indicates an increased risk of falling): 9  Pain Assessment  Pain Assessment: 0-10  0-10 (Numeric) Pain Score: 0 - No pain       Objective      Treatments:  Neuromuscular Re-education (89942): 25  minutes  Added 2# ankle weights bilat.  --forward and retro walking with SPC multiple laps wearing 2# ankle weights in // bars  --Side stepping while wearing 2# ankle weights multiple laps --improved posture and awareness of where her feet are.  Therapeutic Exercise (25106): 20 minutes  Standing hip  abduction wearing 2# 2 x 10 reps bilat, standing hip extension 2# x 10 reps  Seated LAQ's 2# 3 x 10 reps  Seated hip abduction with blue t-band 3 x 10 reps         Current HEP:   Supine quad set + SLR   Standing hip ABD   Bridges- legs on bolster   LAQ with 3# ankle weight or TB  Resisted HS curls with blue TB    Mini squats   Lateral & AP/staggered weight shifting  Side stepping with countertop  Seated hip ABD/ER with TB   Access Code: [email or web address]     Access Code: [email or web address]  URL: [email or web address]  Date: 03/24/2025  Prepared by: Deisy Zazueta    Exercises  - Active Straight Leg Raise with Quad Set (Mirrored)  - 1 x daily - 7 x weekly - 2-3 sets - 10 reps - 5 seconds hold  - Standing Hip Abduction (Mirrored)  - 2 x daily - 7 x weekly - 3 sets - 10 reps - 2 sec hold  - Sitting Knee Extension with Resistance (Mirrored)  - 1 x daily - 7 x weekly - 3 sets - 10 reps - 1-2 seconds hold  - Seated Hamstring Curl with Anchored Resistance  - 1 x daily - 7 x weekly - 3 sets - 10 reps - 1-2 seconds hold  - Mini Squat with Counter Support  - 1-2 x daily - 7 x weekly - 3 sets - 10 reps - 1-2 seconds hold  - Side Stepping with Counter Support  - 2 x daily - 7 x weekly - 1 sets - 1 reps - 2 minute bout hold  - Side Plank on Knees  - 1 x daily - 7 x weekly - 1 sets - 10 reps - 5 seconds hold  - Seated Hip Abduction with Resistance  - 1 x daily - 7 x weekly - 3 sets - 10 reps - 5-20 seconds hold      Goals:  By discharge, pt will meet the following new goals established on 2/10/25:     Pt will perform DGI at or above cutoff for falls risk assessment.--PARTIALLY MET  Pt will ambulate 350ft independently with minimal gait deviations.--PARTIALLY MET  Pt will improve strength of B hip ABD to at least 4/5.--PARTIALLY MET

## 2025-06-23 ENCOUNTER — APPOINTMENT (OUTPATIENT)
Dept: PHYSICAL THERAPY | Facility: CLINIC | Age: 80
End: 2025-06-23
Payer: MEDICARE

## 2025-06-23 DIAGNOSIS — M25.672 ANKLE STIFFNESS, LEFT: Primary | ICD-10-CM

## 2025-06-23 DIAGNOSIS — M25.472 EDEMA OF LEFT ANKLE: ICD-10-CM

## 2025-06-23 DIAGNOSIS — R26.2 DIFFICULTY WALKING: ICD-10-CM

## 2025-06-25 ENCOUNTER — TREATMENT (OUTPATIENT)
Dept: PHYSICAL THERAPY | Facility: CLINIC | Age: 80
End: 2025-06-25
Payer: MEDICARE

## 2025-06-25 DIAGNOSIS — R26.2 DIFFICULTY WALKING: ICD-10-CM

## 2025-06-25 DIAGNOSIS — M25.472 EDEMA OF LEFT ANKLE: ICD-10-CM

## 2025-06-25 DIAGNOSIS — M25.672 ANKLE STIFFNESS, LEFT: Primary | ICD-10-CM

## 2025-06-25 PROCEDURE — 97112 NEUROMUSCULAR REEDUCATION: CPT | Mod: GP,KX,CQ

## 2025-06-25 PROCEDURE — 97110 THERAPEUTIC EXERCISES: CPT | Mod: GP,KX,CQ

## 2025-06-25 NOTE — PROGRESS NOTES
Physical Therapy    Physical Therapy Treatment    Patient Name: Zena Baxter  MRN: [id number]  Today's Date: 6/25/2025    Time Entry:   Time Calculation  Start Time: 1438  Stop Time: 1518  Time Calculation (min): 40 min     PT Therapeutic Procedures Time Entry  Therapeutic Exercise Time Entry: 15  Neuromuscular Re-Education Time Entry: 25                 Visit #49(9 of 10 new)   2024  90 DAY 8/14/2025   27th visit in 2025    Assessment:   Pt tolerated session well and is making progress towards goals.   Hip strengthening exercises challenging.  Pt. Reports improved sleep lately and has been feeling good.  Pt will continue to benefit from PT to address her functional mobility and balance.     Plan:   Continue POC.  Eventually work on slip .   Hip strengthening and DGI tasks & walking without device.   Balloon volley at slower speeds to challenge static balance with head/arm movement  Bridges with TB at knees   As able:   Work on AP weight shifting control.   Cable column walkouts with SPC  Harnessed:   Stepping over sticks fwd & lateral   Practice tighter turns   Hurdles   Stepping strategy     Current Problem  1. Ankle stiffness, left        2. Difficulty walking        3. Edema of left ankle          General   Pt states she  misjudged where her bench is at the  bottom of her bed and went to sit down and only half her buttocks hit the bench and then she ended upon the ground.  Pt. Denies injury      Subjective    Precautions   Precautions  STEADI Fall Risk Score (The score of 4 or more indicates an increased risk of falling): 9  Pain Assessment  Pain Assessment: 0-10  0-10 (Numeric) Pain Score: 0 - No pain       Objective      Treatments:  Neuromuscular Re-education (39087): 25  minutes  While wearing 2# ankle weights lorenzo LE's. Forward walking in the // bars while using the cane.  Walking with sticks in between to widen base of support.  Forward and lateral stepping over the sticks multiple reps of each  while using the cane.  Therapeutic Exercise (89812): 15 minutes  Standing hip abduction wearing 2# 2 x 10 reps bilat, standing marching without weight and standing SLR's 2 x 10 reps bilat       Current HEP:   Supine quad set + SLR   Standing hip ABD   Bridges- legs on bolster   LAQ with 3# ankle weight or TB  Resisted HS curls with blue TB    Mini squats   Lateral & AP/staggered weight shifting  Side stepping with countertop  Seated hip ABD/ER with TB   Access Code: [email or web address]     Access Code: [email or web address]  URL: [email or web address]  Date: 03/24/2025  Prepared by: Deisy Zazueta    Exercises  - Active Straight Leg Raise with Quad Set (Mirrored)  - 1 x daily - 7 x weekly - 2-3 sets - 10 reps - 5 seconds hold  - Standing Hip Abduction (Mirrored)  - 2 x daily - 7 x weekly - 3 sets - 10 reps - 2 sec hold  - Sitting Knee Extension with Resistance (Mirrored)  - 1 x daily - 7 x weekly - 3 sets - 10 reps - 1-2 seconds hold  - Seated Hamstring Curl with Anchored Resistance  - 1 x daily - 7 x weekly - 3 sets - 10 reps - 1-2 seconds hold  - Mini Squat with Counter Support  - 1-2 x daily - 7 x weekly - 3 sets - 10 reps - 1-2 seconds hold  - Side Stepping with Counter Support  - 2 x daily - 7 x weekly - 1 sets - 1 reps - 2 minute bout hold  - Side Plank on Knees  - 1 x daily - 7 x weekly - 1 sets - 10 reps - 5 seconds hold  - Seated Hip Abduction with Resistance  - 1 x daily - 7 x weekly - 3 sets - 10 reps - 5-20 seconds hold      Goals:  By discharge, pt will meet the following new goals established on 2/10/25:     Pt will perform DGI at or above cutoff for falls risk assessment.--PARTIALLY MET  Pt will ambulate 350ft independently with minimal gait deviations.--PARTIALLY MET  Pt will improve strength of B hip ABD to at least 4/5.--PARTIALLY MET

## 2025-06-26 NOTE — PROGRESS NOTES
Physical Therapy    Physical Therapy Progress Note    Patient Name: Zena Baxter  MRN: [id number]  Today's Date: 6/27/2025    Time Entry:   Time Calculation  Start Time: 1354  Stop Time: 1440  Time Calculation (min): 46 min     PT Therapeutic Procedures Time Entry  Therapeutic Exercise Time Entry: 46                 Visit #50 (10 of 10 new) RE-ASSESSMENT  2024  90 DAY 8/14/2025   23rd visit in 2025    Assessment:   Pt has not met further progress since last re-assessment. Revising goals accordingly to as below. Pt in agreement that goal for ambulating without device can be discontinued at this time, due to her frequent falls and LOB. Will plan to continue working on DGI with SPC, as well as to increase emphasis on LE strength- as pt was not able to perform STS without UE support today and demo's retropulsion. Pt will continue to benefit from PT, working on revised goals as below at 2x/week frequency for 5 more weeks.     Goals:  By discharge, pt will meet the following new goals established on 2/10/25:     Pt will perform DGI at or above cutoff for falls risk assessment.--PARTIALLY MET  Pt will ambulate 350ft independently with minimal gait deviations.--PARTIALLY MET  Pt will improve strength of B hip ABD to at least 4/5.--PARTIALLY MET      By discharge, pt will meet the following new goals established on 6/27/25:     Pt will perform DGI at or above cutoff for falls risk assessment.  Pt will perform 6MWT with SPC similar to prior episode of care.   Pt will demonstrate ability to perform STS without UE support without LOB or retropulsion.   Pt will perform Short Form RUBIO to 23/28.     Plan:   Continue PT at 2x/week frequency for 5 more weeks, working on revised goals as above.     Current Problem  1. Ankle stiffness, left        2. Difficulty walking        3. Edema of left ankle        4. Balance problem        5. Frequent falls          General   Pt states she still feels very weary of turning to look behind  her, as well as when standing with her EC.     Subjective    Precautions  Precautions  STEADI Fall Risk Score (The score of 4 or more indicates an increased risk of falling): 9  Pain Assessment  Pain Assessment: 0-10  0-10 (Numeric) Pain Score: 0 - No pain    Objective   Other Measures  5x Sit to Stand: 16.94 (with B UE support, frequent posterior LOB)  Activities - Specific Balance Confidence Scale: 1070 (66.8% of self confidence)  Dynamic Gait Index (DGI): 11/24 (performed with SPC today)    Treatments:  Therapeutic Exercise (86092): 46 minutes  Objective measures for re-assessment, as above.    Current HEP:   Supine quad set + SLR   Standing hip ABD   Bridges- legs on bolster   LAQ with 3# ankle weight or TB  Resisted HS curls with blue TB    Mini squats   Lateral & AP/staggered weight shifting  Side stepping with countertop  Seated hip ABD/ER with TB   Access Code: [email or web address]     Access Code: [email or web address]  URL: [email or web address]  Date: 03/24/2025  Prepared by: Deisy Zazueta    Exercises  - Active Straight Leg Raise with Quad Set (Mirrored)  - 1 x daily - 7 x weekly - 2-3 sets - 10 reps - 5 seconds hold  - Standing Hip Abduction (Mirrored)  - 2 x daily - 7 x weekly - 3 sets - 10 reps - 2 sec hold  - Sitting Knee Extension with Resistance (Mirrored)  - 1 x daily - 7 x weekly - 3 sets - 10 reps - 1-2 seconds hold  - Seated Hamstring Curl with Anchored Resistance  - 1 x daily - 7 x weekly - 3 sets - 10 reps - 1-2 seconds hold  - Mini Squat with Counter Support  - 1-2 x daily - 7 x weekly - 3 sets - 10 reps - 1-2 seconds hold  - Side Stepping with Counter Support  - 2 x daily - 7 x weekly - 1 sets - 1 reps - 2 minute bout hold  - Side Plank on Knees  - 1 x daily - 7 x weekly - 1 sets - 10 reps - 5 seconds hold  - Seated Hip Abduction with Resistance  - 1 x daily - 7 x weekly - 3 sets - 10 reps - 5-20 seconds hold

## 2025-06-27 ENCOUNTER — APPOINTMENT (OUTPATIENT)
Dept: PHYSICAL THERAPY | Facility: CLINIC | Age: 80
End: 2025-06-27
Payer: MEDICARE

## 2025-06-27 DIAGNOSIS — R26.89 BALANCE PROBLEM: ICD-10-CM

## 2025-06-27 DIAGNOSIS — M25.672 ANKLE STIFFNESS, LEFT: Primary | ICD-10-CM

## 2025-06-27 DIAGNOSIS — R26.2 DIFFICULTY WALKING: ICD-10-CM

## 2025-06-27 DIAGNOSIS — R29.6 FREQUENT FALLS: ICD-10-CM

## 2025-06-27 DIAGNOSIS — M25.472 EDEMA OF LEFT ANKLE: ICD-10-CM

## 2025-06-27 PROCEDURE — 97110 THERAPEUTIC EXERCISES: CPT | Mod: GP,KX

## 2025-06-27 ASSESSMENT — PAIN - FUNCTIONAL ASSESSMENT: PAIN_FUNCTIONAL_ASSESSMENT: 0-10

## 2025-06-27 ASSESSMENT — PAIN SCALES - GENERAL: PAINLEVEL_OUTOF10: 0 - NO PAIN

## 2025-06-27 NOTE — LETTER
June 27, 2025    Deisy Zazueta, PT  [address] [address] [place] [address]    Patient: Zena Baxter   YOB: 1945   Date of Visit: 6/27/2025       Dear Uma Wisdom PA-C  [address] [place] [address]    The attached plan of care is being sent to you because your patient’s medical reimbursement requires that you certify the plan of care. Your signature is required to allow uninterrupted insurance coverage.      You may indicate your approval by signing below and faxing this form back to us at Dept Fax: [phone].    Please call Dept: [phone] with any questions or concerns.    Thank you for this referral,        Deisy Zazueta, PT  [facility] [address] [facility]  [address] [facility] [address]    Payer: Payor: MEDICARE / Plan: MEDICARE PART A AND B / Product Type: *No Product type* /                                                                         Date:     Dear Deisy Zazueta, PT,     Re: Ms. Zena Baxter, MRN:[id number]    I certify that I have reviewed the attached plan of care and it is medically necessary for Ms. Zena Baxter (1945) who is under my care.          ______________________________________                    _________________  Provider name and credentials                                           Date and time                                                                                           Plan of Care 6/27/25   Effective from: 6/27/2025  Effective to: 9/25/2025    Active  Plan ID: [id number]           Participants as of 6/27/2025    Name Type Comments Contact Info    Deisy Zazueta PT Physical Therapist  [phone]    Leland Blake MD Consulting Physician Pt with Medicare insurance. Please sign to re-certify PT POC. [phone]      Last Plan Note     Author: Deisy Zazueta PT Status: Sign when Signing Visit Last edited: 6/27/2025  1:45 PM       Physical Therapy    Physical Therapy Progress Note    Patient  Name: Zena Baxter  MRN: [id number]  Today's Date: 6/27/2025    Time Entry:   Time Calculation  Start Time: 1354  Stop Time: 1440  Time Calculation (min): 46 min     PT Therapeutic Procedures Time Entry  Therapeutic Exercise Time Entry: 46                 Visit #50 (10 of 10 new) RE-ASSESSMENT  2024  90 DAY 8/14/2025   23rd visit in 2025    Assessment:   Pt has not met further progress since last re-assessment. Revising goals accordingly to as below. Pt in agreement that goal for ambulating without device can be discontinued at this time, due to her frequent falls and LOB. Will plan to continue working on DGI with SPC, as well as to increase emphasis on LE strength- as pt was not able to perform STS without UE support today and demo's retropulsion. Pt will continue to benefit from PT, working on revised goals as below at 2x/week frequency for 5 more weeks.     Goals:  By discharge, pt will meet the following new goals established on 2/10/25:     Pt will perform DGI at or above cutoff for falls risk assessment.--PARTIALLY MET  Pt will ambulate 350ft independently with minimal gait deviations.--PARTIALLY MET  Pt will improve strength of B hip ABD to at least 4/5.--PARTIALLY MET      By discharge, pt will meet the following new goals established on 6/27/25:     Pt will perform DGI at or above cutoff for falls risk assessment.  Pt will perform 6MWT with SPC similar to prior episode of care.   Pt will demonstrate ability to perform STS without UE support without LOB or retropulsion.   Pt will perform Short Form RUBIO to 23/28.     Plan:   Continue PT at 2x/week frequency for 5 more weeks, working on revised goals as above.     Current Problem  1. Ankle stiffness, left        2. Difficulty walking        3. Edema of left ankle        4. Balance problem        5. Frequent falls          General   Pt states she still feels very weary of turning to look behind her, as well as when standing with her EC.     Subjective     Precautions  Precautions  STEADI Fall Risk Score (The score of 4 or more indicates an increased risk of falling): 9  Pain Assessment  Pain Assessment: 0-10  0-10 (Numeric) Pain Score: 0 - No pain    Objective   Other Measures  5x Sit to Stand: 16.94 (with B UE support, frequent posterior LOB)  Activities - Specific Balance Confidence Scale: 1070 (66.8% of self confidence)  Dynamic Gait Index (DGI): 11/24 (performed with SPC today)    Treatments:  Therapeutic Exercise (29340): 46 minutes  Objective measures for re-assessment, as above.    Current HEP:   Supine quad set + SLR   Standing hip ABD   Bridges- legs on bolster   LAQ with 3# ankle weight or TB  Resisted HS curls with blue TB    Mini squats   Lateral & AP/staggered weight shifting  Side stepping with countertop  Seated hip ABD/ER with TB   Access Code: [email or web address]     Access Code: [email or web address]  URL: [email or web address]  Date: 03/24/2025  Prepared by: Deisy Zazueta    Exercises  - Active Straight Leg Raise with Quad Set (Mirrored)  - 1 x daily - 7 x weekly - 2-3 sets - 10 reps - 5 seconds hold  - Standing Hip Abduction (Mirrored)  - 2 x daily - 7 x weekly - 3 sets - 10 reps - 2 sec hold  - Sitting Knee Extension with Resistance (Mirrored)  - 1 x daily - 7 x weekly - 3 sets - 10 reps - 1-2 seconds hold  - Seated Hamstring Curl with Anchored Resistance  - 1 x daily - 7 x weekly - 3 sets - 10 reps - 1-2 seconds hold  - Mini Squat with Counter Support  - 1-2 x daily - 7 x weekly - 3 sets - 10 reps - 1-2 seconds hold  - Side Stepping with Counter Support  - 2 x daily - 7 x weekly - 1 sets - 1 reps - 2 minute bout hold  - Side Plank on Knees  - 1 x daily - 7 x weekly - 1 sets - 10 reps - 5 seconds hold  - Seated Hip Abduction with Resistance  - 1 x daily - 7 x weekly - 3 sets - 10 reps - 5-20 seconds hold

## 2025-06-30 ENCOUNTER — APPOINTMENT (OUTPATIENT)
Dept: PHYSICAL THERAPY | Facility: CLINIC | Age: 80
End: 2025-06-30
Payer: MEDICARE

## 2025-06-30 DIAGNOSIS — R26.2 DIFFICULTY WALKING: ICD-10-CM

## 2025-06-30 DIAGNOSIS — M25.672 ANKLE STIFFNESS, LEFT: Primary | ICD-10-CM

## 2025-06-30 DIAGNOSIS — M25.472 EDEMA OF LEFT ANKLE: ICD-10-CM

## 2025-06-30 PROCEDURE — 97110 THERAPEUTIC EXERCISES: CPT | Mod: GP,KX,CQ

## 2025-06-30 PROCEDURE — 97112 NEUROMUSCULAR REEDUCATION: CPT | Mod: GP,KX,CQ

## 2025-06-30 NOTE — PROGRESS NOTES
Physical Therapy    Physical Therapy Treatment    Patient Name: Zena Baxter  MRN: [id number]  Today's Date: 6/30/2025    Time Entry:   Time Calculation  Start Time: 1308  Stop Time: 1349  Time Calculation (min): 41 min     PT Therapeutic Procedures Time Entry  Therapeutic Exercise Time Entry: 26  Neuromuscular Re-Education Time Entry: 15                 Visit #51 (1 of 10 new)   2024  90 DAY 8/14/2025   24th visit in 2025    Assessment:   Pt tolerated session well with reports of legs feeling a little shaky afterwards.  The focus of session was strengthening for LE's.   Sit to stands from chair without foam pad challenging with some retropulsion.   Pt will continue to benefit from PT to address her functional mobility and balance.     Goals:  By discharge, pt will meet the following new goals established on 2/10/25:     Pt will perform DGI at or above cutoff for falls risk assessment.--PARTIALLY MET  Pt will ambulate 350ft independently with minimal gait deviations.--PARTIALLY MET  Pt will improve strength of B hip ABD to at least 4/5.--PARTIALLY MET      By discharge, pt will meet the following new goals established on 6/27/25:     Pt will perform DGI at or above cutoff for falls risk assessment.  Pt will perform 6MWT with SPC similar to prior episode of care.   Pt will demonstrate ability to perform STS without UE support without LOB or retropulsion.   Pt will perform Short Form RUBIO to 23/28.     Plan:   Continue POC  Current Problem  1. Ankle stiffness, left        2. Difficulty walking        3. Edema of left ankle          General   Pt states she had a very busy and active weekend.    Subjective    Precautions   Precautions  STEADI Fall Risk Score (The score of 4 or more indicates an increased risk of falling): 9       Objective        Treatments:  Therapeutic Exercise (10463): 26 minutes  Cybex leg press 80# x 10 reps, 85# 2 x 10 reps  Cybex knee flexion 50# 2 x 10 reps  4 inch step ups with bilat UE  support x 10 reps      Neuromuscular Re-education (38311): 15 minutes  Sit to stands from chair with foam pad 2 x 5 reps with cues on technique and to increase anterior weight shift.  Sit to stand from chair without foam pad x 5 reps with min assist x 1.    Current HEP:   Supine quad set + SLR   Standing hip ABD   Bridges- legs on bolster   LAQ with 3# ankle weight or TB  Resisted HS curls with blue TB    Mini squats   Lateral & AP/staggered weight shifting  Side stepping with countertop  Seated hip ABD/ER with TB   Access Code: [email or web address]     Access Code: [email or web address]  URL: [email or web address]  Date: 03/24/2025  Prepared by: Deisy Zazueta    Exercises  - Active Straight Leg Raise with Quad Set (Mirrored)  - 1 x daily - 7 x weekly - 2-3 sets - 10 reps - 5 seconds hold  - Standing Hip Abduction (Mirrored)  - 2 x daily - 7 x weekly - 3 sets - 10 reps - 2 sec hold  - Sitting Knee Extension with Resistance (Mirrored)  - 1 x daily - 7 x weekly - 3 sets - 10 reps - 1-2 seconds hold  - Seated Hamstring Curl with Anchored Resistance  - 1 x daily - 7 x weekly - 3 sets - 10 reps - 1-2 seconds hold  - Mini Squat with Counter Support  - 1-2 x daily - 7 x weekly - 3 sets - 10 reps - 1-2 seconds hold  - Side Stepping with Counter Support  - 2 x daily - 7 x weekly - 1 sets - 1 reps - 2 minute bout hold  - Side Plank on Knees  - 1 x daily - 7 x weekly - 1 sets - 10 reps - 5 seconds hold  - Seated Hip Abduction with Resistance  - 1 x daily - 7 x weekly - 3 sets - 10 reps - 5-20 seconds hold

## 2025-07-02 NOTE — PROGRESS NOTES
Physical Therapy    Physical Therapy Treatment    Patient Name: Zena Baxter  MRN: [id number]  Today's Date: 7/3/2025    Time Entry:   Time Calculation  Start Time: 1429  Stop Time: 1500  Time Calculation (min): 31 min     PT Therapeutic Procedures Time Entry  Therapeutic Exercise Time Entry: 26  Neuromuscular Re-Education Time Entry: 5                 Visit #52 of 60  2025  90 DAY 9/25/2025     Assessment:   Pt tolerated session well without adverse effect and is making progress toward goals. Pt was able to increase resistances on cybex machines by ~10# as well as moving through greater ROM. Pt was able to feel active gluteal contraction & work today. Pt will continue to benefit from PT to address her LE strength for assistance with transfers as well as to work on her functional mobility and balance.     Plan:   Continue working on cybex machines for LE strengthening.     Current Problem  1. Ankle stiffness, left        2. Difficulty walking        3. Edema of left ankle        4. Balance problem        5. Frequent falls          General   Pt apologetic due to late arrival of pt.        Subjective    Precautions  Precautions  STEADI Fall Risk Score (The score of 4 or more indicates an increased risk of falling): 9  Vital Signs     Pain  Pain Assessment  Pain Assessment: 0-10  0-10 (Numeric) Pain Score: 0 - No pain    Objective     Treatments:  Therapeutic Exercise (31031): 26 minutes  Cybex leg press: seat at 3, footplate at 3. 85# 3x10 reps, 90# 3x10 reps- added blue thera band for additional resistance for hip ABD/ER    Cybex HS curl: seat at 4, footpad at 3: 50# 3x10 reps    NOT TODAY:  4 inch step ups with bilat UE support x 10 reps    Access Code: [email or web address]  URL: [email or web address]  Date: 03/24/2025  Prepared by: Deisy Zazueta     Exercises  - Active Straight Leg Raise with Quad Set (Mirrored)  - 1 x daily - 7 x weekly - 2-3 sets - 10 reps - 5 seconds hold  - Standing Hip Abduction  (Mirrored)  - 2 x daily - 7 x weekly - 3 sets - 10 reps - 2 sec hold  - Sitting Knee Extension with Resistance (Mirrored)  - 1 x daily - 7 x weekly - 3 sets - 10 reps - 1-2 seconds hold  - Seated Hamstring Curl with Anchored Resistance  - 1 x daily - 7 x weekly - 3 sets - 10 reps - 1-2 seconds hold  - Mini Squat with Counter Support  - 1-2 x daily - 7 x weekly - 3 sets - 10 reps - 1-2 seconds hold  - Side Stepping with Counter Support  - 2 x daily - 7 x weekly - 1 sets - 1 reps - 2 minute bout hold  - Side Plank on Knees  - 1 x daily - 7 x weekly - 1 sets - 10 reps - 5 seconds hold  - Seated Hip Abduction with Resistance  - 1 x daily - 7 x weekly - 3 sets - 10 reps - 5-20 seconds hold    Neuromuscular Re-education (84056): 5 minutes  STS x5 reps, MOD to MAX A of therapist    OP EDUCATION:       Goals:  By discharge, pt will meet the following new goals established on 6/27/25:      Pt will perform DGI at or above cutoff for falls risk assessment.  Pt will perform 6MWT with SPC similar to prior episode of care.   Pt will demonstrate ability to perform STS without UE support without LOB or retropulsion.   Pt will perform Short Form RUBIO to 23/28.

## 2025-07-03 ENCOUNTER — APPOINTMENT (OUTPATIENT)
Dept: PHYSICAL THERAPY | Facility: CLINIC | Age: 80
End: 2025-07-03
Payer: MEDICARE

## 2025-07-03 DIAGNOSIS — R26.89 BALANCE PROBLEM: ICD-10-CM

## 2025-07-03 DIAGNOSIS — R26.2 DIFFICULTY WALKING: ICD-10-CM

## 2025-07-03 DIAGNOSIS — R29.6 FREQUENT FALLS: ICD-10-CM

## 2025-07-03 DIAGNOSIS — M25.472 EDEMA OF LEFT ANKLE: ICD-10-CM

## 2025-07-03 DIAGNOSIS — M25.672 ANKLE STIFFNESS, LEFT: Primary | ICD-10-CM

## 2025-07-03 PROCEDURE — 97110 THERAPEUTIC EXERCISES: CPT | Mod: GP

## 2025-07-03 ASSESSMENT — PAIN SCALES - GENERAL: PAINLEVEL_OUTOF10: 0 - NO PAIN

## 2025-07-03 ASSESSMENT — PAIN - FUNCTIONAL ASSESSMENT: PAIN_FUNCTIONAL_ASSESSMENT: 0-10

## 2025-07-09 ENCOUNTER — APPOINTMENT (OUTPATIENT)
Dept: PHYSICAL THERAPY | Facility: CLINIC | Age: 80
End: 2025-07-09
Payer: MEDICARE

## 2025-07-09 DIAGNOSIS — R26.2 DIFFICULTY WALKING: ICD-10-CM

## 2025-07-09 DIAGNOSIS — M25.672 ANKLE STIFFNESS, LEFT: Primary | ICD-10-CM

## 2025-07-09 DIAGNOSIS — M25.472 EDEMA OF LEFT ANKLE: ICD-10-CM

## 2025-07-09 PROCEDURE — 97110 THERAPEUTIC EXERCISES: CPT | Mod: GP,KX,CQ

## 2025-07-09 NOTE — PROGRESS NOTES
Physical Therapy    Physical Therapy Treatment    Patient Name: Zena Baxter  MRN: [id number]  Today's Date: 7/9/2025    Time Entry:   Time Calculation  Start Time: 1123  Stop Time: 1203  Time Calculation (min): 40 min     PT Therapeutic Procedures Time Entry  Therapeutic Exercise Time Entry: 40                 Visit #53 of   2025  90 DAY 9/25/2025     Assessment:   Pt tolerated session well without adverse effect and is making progress toward goals. Pt. Reported legs feeling tired after strengthening exercises. Pt will continue to benefit from PT to address her LE strength for assistance with transfers as well as to work on her functional mobility and balance.     Plan:   Continue working on cybex machines for LE strengthening.     Current Problem  1. Ankle stiffness, left        2. Difficulty walking        3. Edema of left ankle          General   Pt apologetic due to late arrival.  Pt. States didn't sleep well last night.       Subjective    Precautions   Precautions  STEADI Fall Risk Score (The score of 4 or more indicates an increased risk of falling): 9  Vital Signs     Pain       Objective     Treatments:  Therapeutic Exercise (53446):  40 minutes  Cybex leg press: seat at 3, footplate at 3. 85# x 10 reps, 90# 2x10 reps, 95# 2 x 10 reps-  blue thera band for additional resistance for hip ABD/ER    Cybex HS curl: seat at 4, footpad at 3: 50# 3x10 reps    4 inch step ups with bilat UE support x 10 reps    Access Code: [email or web address]  URL: [email or web address]  Date: 03/24/2025  Prepared by: Deisy Zazueta     Exercises  - Active Straight Leg Raise with Quad Set (Mirrored)  - 1 x daily - 7 x weekly - 2-3 sets - 10 reps - 5 seconds hold  - Standing Hip Abduction (Mirrored)  - 2 x daily - 7 x weekly - 3 sets - 10 reps - 2 sec hold  - Sitting Knee Extension with Resistance (Mirrored)  - 1 x daily - 7 x weekly - 3 sets - 10 reps - 1-2 seconds hold  - Seated Hamstring Curl with Anchored  Resistance  - 1 x daily - 7 x weekly - 3 sets - 10 reps - 1-2 seconds hold  - Mini Squat with Counter Support  - 1-2 x daily - 7 x weekly - 3 sets - 10 reps - 1-2 seconds hold  - Side Stepping with Counter Support  - 2 x daily - 7 x weekly - 1 sets - 1 reps - 2 minute bout hold  - Side Plank on Knees  - 1 x daily - 7 x weekly - 1 sets - 10 reps - 5 seconds hold  - Seated Hip Abduction with Resistance  - 1 x daily - 7 x weekly - 3 sets - 10 reps - 5-20 seconds hold    Neuromuscular Re-education (60348): 5 minutes  STS x5 reps, MOD to MAX A of therapist    OP EDUCATION:       Goals:  By discharge, pt will meet the following new goals established on 6/27/25:      Pt will perform DGI at or above cutoff for falls risk assessment.  Pt will perform 6MWT with SPC similar to prior episode of care.   Pt will demonstrate ability to perform STS without UE support without LOB or retropulsion.   Pt will perform Short Form RUBIO to 23/28.

## 2025-07-11 ENCOUNTER — APPOINTMENT (OUTPATIENT)
Dept: PHYSICAL THERAPY | Facility: CLINIC | Age: 80
End: 2025-07-11
Payer: MEDICARE

## 2025-07-11 DIAGNOSIS — M25.472 EDEMA OF LEFT ANKLE: ICD-10-CM

## 2025-07-11 DIAGNOSIS — R26.2 DIFFICULTY WALKING: ICD-10-CM

## 2025-07-11 DIAGNOSIS — M25.672 ANKLE STIFFNESS, LEFT: Primary | ICD-10-CM

## 2025-07-11 PROCEDURE — 97110 THERAPEUTIC EXERCISES: CPT | Mod: GP,KX,CQ

## 2025-07-11 NOTE — PROGRESS NOTES
Physical Therapy    Physical Therapy Treatment    Patient Name: Zena Baxter  MRN: [id number]  Today's Date: 7/11/2025    Time Entry:   Time Calculation  Start Time: 1522  Stop Time: 1605  Time Calculation (min): 43 min     PT Therapeutic Procedures Time Entry  Therapeutic Exercise Time Entry: 43                 Visit #54 of 60  2025  90 DAY 9/25/2025     Assessment:   Pt tolerated session well without adverse effect and is making progress toward goals.  Side stepping  a little challenging in regards to balance. Increased weight on leg press today.  Pt. Fatigued in legs at end of session. Pt will continue to benefit from PT to address her LE strength for assistance with transfers as well as to work on her functional mobility and balance.     Plan:   Continue working on cybex machines for LE strengthening.     Current Problem  1. Ankle stiffness, left        2. Difficulty walking        3. Edema of left ankle          General   Pt reports nothing new this session     Subjective    Precautions   Precautions  STEADI Fall Risk Score (The score of 4 or more indicates an increased risk of falling): 9  Vital Signs     Pain       Objective     Treatments:  Therapeutic Exercise (22945):  43 minutes    Column walkouts with 5# side stepping in each direction x 3 trials each with cga/min assist at times.    Cybex leg press: seat at 3, footplate at 3.  90# 2x10 reps, 95#  x 10 reps, 100# x 10 reps-  Not today for the theraband. blue thera band for additional resistance for hip ABD/ER    Cybex HS curl: seat at 4, footpad at 3: 50# 3x10 reps    4 inch step ups with bilat UE support x 10 reps NOT TODAY    Access Code: [email or web address]  URL: [email or web address]  Date: 03/24/2025  Prepared by: Deisy Zazueta     Exercises  - Active Straight Leg Raise with Quad Set (Mirrored)  - 1 x daily - 7 x weekly - 2-3 sets - 10 reps - 5 seconds hold  - Standing Hip Abduction (Mirrored)  - 2 x daily - 7 x weekly - 3 sets - 10  reps - 2 sec hold  - Sitting Knee Extension with Resistance (Mirrored)  - 1 x daily - 7 x weekly - 3 sets - 10 reps - 1-2 seconds hold  - Seated Hamstring Curl with Anchored Resistance  - 1 x daily - 7 x weekly - 3 sets - 10 reps - 1-2 seconds hold  - Mini Squat with Counter Support  - 1-2 x daily - 7 x weekly - 3 sets - 10 reps - 1-2 seconds hold  - Side Stepping with Counter Support  - 2 x daily - 7 x weekly - 1 sets - 1 reps - 2 minute bout hold  - Side Plank on Knees  - 1 x daily - 7 x weekly - 1 sets - 10 reps - 5 seconds hold  - Seated Hip Abduction with Resistance  - 1 x daily - 7 x weekly - 3 sets - 10 reps - 5-20 seconds hold    OP EDUCATION:       Goals:  By discharge, pt will meet the following new goals established on 6/27/25:      Pt will perform DGI at or above cutoff for falls risk assessment.  Pt will perform 6MWT with SPC similar to prior episode of care.   Pt will demonstrate ability to perform STS without UE support without LOB or retropulsion.   Pt will perform Short Form RUBIO to 23/28.

## 2025-07-16 ENCOUNTER — APPOINTMENT (OUTPATIENT)
Dept: PHYSICAL THERAPY | Facility: CLINIC | Age: 80
End: 2025-07-16
Payer: MEDICARE

## 2025-07-16 DIAGNOSIS — R26.2 DIFFICULTY WALKING: ICD-10-CM

## 2025-07-16 DIAGNOSIS — M25.472 EDEMA OF LEFT ANKLE: ICD-10-CM

## 2025-07-16 DIAGNOSIS — M25.672 ANKLE STIFFNESS, LEFT: Primary | ICD-10-CM

## 2025-07-16 PROCEDURE — 97110 THERAPEUTIC EXERCISES: CPT | Mod: GP,KX,CQ

## 2025-07-16 NOTE — PROGRESS NOTES
Physical Therapy    Physical Therapy Treatment    Patient Name: Zena Baxter  MRN: [id number]  Today's Date: 7/16/2025    Time Entry:   Time Calculation  Start Time: 1525  Stop Time: 1605  Time Calculation (min): 40 min     PT Therapeutic Procedures Time Entry  Therapeutic Exercise Time Entry: 40                 Visit #55 of 60  2025  90 DAY 9/25/2025     Assessment:   Pt tolerated session well without adverse effect and is making progress toward goals.  Pt. Tolerated increased weight  with leg press today, we added knee extension today.   Pt. Reported feeling dizzy after last cybex machine which was the leg press, provided with water and she felt improved. Pt will continue to benefit from PT to address her LE strength for assistance with transfers as well as to work on her functional mobility and balance.     Plan:   Continue working on cybex machines for LE strengthening.     Current Problem  1. Ankle stiffness, left        2. Difficulty walking        3. Edema of left ankle          General   Pt. States she is beyond tired,her cleaning lady came this morning and she had to wake up for her.  Pt. Received her order for Lymphedema in the  mail and will schedule after this session.    Subjective    Precautions   Precautions  STEADI Fall Risk Score (The score of 4 or more indicates an increased risk of falling): 9  Vital Signs     Pain       Objective     Treatments:  Therapeutic Exercise (28741):  40 minutes    Cybex leg press: seat at 3, footplate at 3.  95# 2x10 reps, 100#  x 10 reps, 105# x 10 reps-  Not today for the theraband. blue thera band for additional resistance for hip ABD/ER.  Pt. Reported feeling dizzy and was provided with water.    Cybex HS curl: seat at 4, footpad at 3: 50# 3x10 reps    Cybex knee extension 30# x 10 reps, 35#   2x 10 reps    Column walkouts with 5# side stepping in each direction x 3 trials each with cga/min assist at times.  NOT TODAY  4 inch step ups with bilat UE support x 10  reps NOT TODAY    Access Code: [email or web address]  URL: [email or web address]  Date: 03/24/2025  Prepared by: Deisy Zazueta     Exercises  - Active Straight Leg Raise with Quad Set (Mirrored)  - 1 x daily - 7 x weekly - 2-3 sets - 10 reps - 5 seconds hold  - Standing Hip Abduction (Mirrored)  - 2 x daily - 7 x weekly - 3 sets - 10 reps - 2 sec hold  - Sitting Knee Extension with Resistance (Mirrored)  - 1 x daily - 7 x weekly - 3 sets - 10 reps - 1-2 seconds hold  - Seated Hamstring Curl with Anchored Resistance  - 1 x daily - 7 x weekly - 3 sets - 10 reps - 1-2 seconds hold  - Mini Squat with Counter Support  - 1-2 x daily - 7 x weekly - 3 sets - 10 reps - 1-2 seconds hold  - Side Stepping with Counter Support  - 2 x daily - 7 x weekly - 1 sets - 1 reps - 2 minute bout hold  - Side Plank on Knees  - 1 x daily - 7 x weekly - 1 sets - 10 reps - 5 seconds hold  - Seated Hip Abduction with Resistance  - 1 x daily - 7 x weekly - 3 sets - 10 reps - 5-20 seconds hold    OP EDUCATION:       Goals:  By discharge, pt will meet the following new goals established on 6/27/25:      Pt will perform DGI at or above cutoff for falls risk assessment.  Pt will perform 6MWT with SPC similar to prior episode of care.   Pt will demonstrate ability to perform STS without UE support without LOB or retropulsion.   Pt will perform Short Form RUBIO to 23/28.

## 2025-07-18 ENCOUNTER — APPOINTMENT (OUTPATIENT)
Dept: PHYSICAL THERAPY | Facility: CLINIC | Age: 80
End: 2025-07-18
Payer: MEDICARE

## 2025-07-18 DIAGNOSIS — M25.672 ANKLE STIFFNESS, LEFT: Primary | ICD-10-CM

## 2025-07-18 DIAGNOSIS — R26.2 DIFFICULTY WALKING: ICD-10-CM

## 2025-07-18 DIAGNOSIS — M25.472 EDEMA OF LEFT ANKLE: ICD-10-CM

## 2025-07-18 PROCEDURE — 97110 THERAPEUTIC EXERCISES: CPT | Mod: GP,KX,CQ

## 2025-07-18 NOTE — PROGRESS NOTES
Physical Therapy    Physical Therapy Treatment    Patient Name: Zena Baxter  MRN: [id number]  Today's Date: 7/18/2025    Time Entry:   Time Calculation  Start Time: 1438  Stop Time: 1520  Time Calculation (min): 42 min     PT Therapeutic Procedures Time Entry  Therapeutic Exercise Time Entry: 42                 Visit #56 of 60  2025  90 DAY 9/25/2025     Assessment:   Pt tolerated session well without adverse effect and is making progress toward goals. Pt. Denied dizziness this session and is reporting that she feels like she is getting stronger.  Increased weight on the hamstring curl this session. Pt will continue to benefit from PT to address her LE strength for assistance with transfers as well as to work on her functional mobility and balance.     Plan:   Continue working on cybex machines for LE strengthening.   Continue working on sit to stands, hip ABD strengthening, & balance.    Current Problem  1. Ankle stiffness, left        2. Difficulty walking        3. Edema of left ankle          General   Pt. Reports nothing new today.  She feels like she is getting stronger.    Subjective    Precautions   Precautions  STEADI Fall Risk Score (The score of 4 or more indicates an increased risk of falling): 9  Vital Signs     Pain   0/10    Objective     Treatments:  Therapeutic Exercise (75298):  42 minutes    Cybex leg press: seat at 3, footplate at 3.  95# x10 reps, 100#  x 10 reps, 105# x 10 reps-  blue thera band for additional resistance for hip ABD/ER.      Cybex HS curl: seat at 4, footpad at 3: 55# 3x10 reps    Cybex knee extension 35#   3x 10 reps    Column walkouts with 5# side stepping in each direction x 3 trials each with cga/min assist at times.  NOT TODAY  4 inch step ups with bilat UE support x 10 reps NOT TODAY    Access Code: [email or web address]  URL: [email or web address]  Date: 03/24/2025  Prepared by: Deisy Zazueta     Exercises  - Active Straight Leg Raise with Quad Set  (Mirrored)  - 1 x daily - 7 x weekly - 2-3 sets - 10 reps - 5 seconds hold  - Standing Hip Abduction (Mirrored)  - 2 x daily - 7 x weekly - 3 sets - 10 reps - 2 sec hold  - Sitting Knee Extension with Resistance (Mirrored)  - 1 x daily - 7 x weekly - 3 sets - 10 reps - 1-2 seconds hold  - Seated Hamstring Curl with Anchored Resistance  - 1 x daily - 7 x weekly - 3 sets - 10 reps - 1-2 seconds hold  - Mini Squat with Counter Support  - 1-2 x daily - 7 x weekly - 3 sets - 10 reps - 1-2 seconds hold  - Side Stepping with Counter Support  - 2 x daily - 7 x weekly - 1 sets - 1 reps - 2 minute bout hold  - Side Plank on Knees  - 1 x daily - 7 x weekly - 1 sets - 10 reps - 5 seconds hold  - Seated Hip Abduction with Resistance  - 1 x daily - 7 x weekly - 3 sets - 10 reps - 5-20 seconds hold    OP EDUCATION:       Goals:  By discharge, pt will meet the following new goals established on 6/27/25:      Pt will perform DGI at or above cutoff for falls risk assessment.  Pt will perform 6MWT with SPC similar to prior episode of care.   Pt will demonstrate ability to perform STS without UE support without LOB or retropulsion.   Pt will perform Short Form RUBIO to 23/28.

## 2025-07-20 ENCOUNTER — APPOINTMENT (OUTPATIENT)
Dept: RADIOLOGY | Facility: HOSPITAL | Age: 80
End: 2025-07-20
Payer: MEDICARE

## 2025-07-20 DIAGNOSIS — I50.30 STAGE C DIASTOLIC HEART FAILURE: ICD-10-CM

## 2025-07-20 PROCEDURE — 75571 CT HRT W/O DYE W/CA TEST: CPT

## 2025-07-23 ENCOUNTER — APPOINTMENT (OUTPATIENT)
Dept: PHYSICAL THERAPY | Facility: CLINIC | Age: 80
End: 2025-07-23
Payer: MEDICARE

## 2025-07-23 DIAGNOSIS — M25.472 EDEMA OF LEFT ANKLE: ICD-10-CM

## 2025-07-23 DIAGNOSIS — R26.2 DIFFICULTY WALKING: ICD-10-CM

## 2025-07-23 DIAGNOSIS — M25.672 ANKLE STIFFNESS, LEFT: Primary | ICD-10-CM

## 2025-07-23 PROCEDURE — 97110 THERAPEUTIC EXERCISES: CPT | Mod: GP,KX,CQ

## 2025-07-23 NOTE — PROGRESS NOTES
Physical Therapy    Physical Therapy Treatment    Patient Name: Zena Baxter  MRN: [id number]  Today's Date: 7/25/2025    Time Entry:   Time Calculation  Start Time: 1437  Stop Time: 1534  Time Calculation (min): 57 min     PT Therapeutic Procedures Time Entry  Therapeutic Exercise Time Entry: 57                 Visit #58 of 60  2025  90 DAY 9/25/2025     Assessment:   Pt tolerated session well without adverse effect and is making some progress toward goals. Pt increased resistance on knee extension machine by 20# today, and on leg press machine by 5#. She was fatigued by end of session. Pt will continue to benefit from PT to address her LE strength for assistance with transfers as well as to work on her functional mobility and balance.     Plan:    Re-assessment next visit.      Try sit to stands prior to working on Cybex machines. Continue working on cybex machines for LE strengthening.   Continue working on sit to stands, hip ABD strengthening, & balance.    Current Problem  1. Ankle stiffness, left        2. Difficulty walking        3. Edema of left ankle        4. Balance problem        5. Frequent falls          General   Pt continues to report noticing improvement in LE strength- had an easier time getting up from the ground. No new falls. Does feel her balance is off today due to dehydration.     Subjective    Precautions  Precautions  STEADI Fall Risk Score (The score of 4 or more indicates an increased risk of falling): 9  Vital Signs     Pain  Pain Assessment  Pain Assessment: 0-10  0-10 (Numeric) Pain Score: 0 - No pain    Objective     Treatments:  Therapeutic Exercise ([id number]):  57 minutes  Side stepping in // bars using green bungee cord at waist for resistance x 6 laps.    Cybex leg press: seat at 3, footplate at 3.  100# 2x10 reps, 110#  2x10 reps, blue thera band for additional resistance for hip ABD/ER.      Cybex knee extension 55# seat at 3, 3x10 reps    Cybex HS curl: seat at 4, footpad at  3: 55# 2x10 reps; x10 reps with 60#    STS at end of session x3 reps. Pt too fatigued to complete and discontinued    NOT TODAY:   Column walkouts with 5# side stepping in each direction x 3 trials each with cga/min assist at times.     NOT TODAY  4 inch step ups with bilat UE support x 10 reps    Access Code: [email or web address]  URL: [email or web address]  Date: 03/24/2025  Prepared by: Deisy Zazueta     Exercises  - Active Straight Leg Raise with Quad Set (Mirrored)  - 1 x daily - 7 x weekly - 2-3 sets - 10 reps - 5 seconds hold  - Standing Hip Abduction (Mirrored)  - 2 x daily - 7 x weekly - 3 sets - 10 reps - 2 sec hold  - Sitting Knee Extension with Resistance (Mirrored)  - 1 x daily - 7 x weekly - 3 sets - 10 reps - 1-2 seconds hold  - Seated Hamstring Curl with Anchored Resistance  - 1 x daily - 7 x weekly - 3 sets - 10 reps - 1-2 seconds hold  - Mini Squat with Counter Support  - 1-2 x daily - 7 x weekly - 3 sets - 10 reps - 1-2 seconds hold  - Side Stepping with Counter Support  - 2 x daily - 7 x weekly - 1 sets - 1 reps - 2 minute bout hold  - Side Plank on Knees  - 1 x daily - 7 x weekly - 1 sets - 10 reps - 5 seconds hold  - Seated Hip Abduction with Resistance  - 1 x daily - 7 x weekly - 3 sets - 10 reps - 5-20 seconds hold    OP EDUCATION:       Goals:  By discharge, pt will meet the following new goals established on 6/27/25:      Pt will perform DGI at or above cutoff for falls risk assessment.  Pt will perform 6MWT with SPC similar to prior episode of care.   Pt will demonstrate ability to perform STS without UE support without LOB or retropulsion.   Pt will perform Short Form RUBIO to 23/28.

## 2025-07-23 NOTE — PROGRESS NOTES
Physical Therapy    Physical Therapy Treatment    Patient Name: Zena Baxter  MRN: [id number]  Today's Date: 7/23/2025    Time Entry:   Time Calculation  Start Time: 1517  Stop Time: 1600  Time Calculation (min): 43 min     PT Therapeutic Procedures Time Entry  Therapeutic Exercise Time Entry: 43                 Visit #57 of 60  2025  90 DAY 9/25/2025     Assessment:   Pt tolerated session well without adverse effect and is making progress toward goals. Pt. Is reporting increased strength as she feels like she can get herself off the floor easier.  Pt will continue to benefit from PT to address her LE strength for assistance with transfers as well as to work on her functional mobility and balance.     Plan:    Try sit to stands prior to working on Cybex machines. Continue working on cybex machines for LE strengthening.   Continue working on sit to stands, hip ABD strengthening, & balance.    Current Problem  1. Ankle stiffness, left        2. Difficulty walking        3. Edema of left ankle          General   Pt. Reports she watered the lawn for 2 hours prior to this session and feels some fatigue.   Wearing compression stocking's for the last three days but didn't put them on today after her shower.   Had cardiac CT scan and scored a zero which patient states it means she has a less than 4% chance of getting a MI or some kind of heart disease.    Subjective    Precautions   Precautions  STEADI Fall Risk Score (The score of 4 or more indicates an increased risk of falling): 9  Vital Signs     Pain   0/10    Objective     Treatments:  Therapeutic Exercise ([id number]):  43 minutes  Side stepping in // bars using green bungee cord at waist for resistance x 6 laps.    Cybex leg press: seat at 3, footplate at 3.  95# x10 reps, 105#  x 10 reps, 1# x 10 reps-  blue thera band for additional resistance for hip ABD/ER.      Cybex HS curl: seat at 4, footpad at 3: 55#  2x10 reps, x 5 reps    Cybex knee extension 35#   3x 10  reps     NOT TODAY Column walkouts with 5# side stepping in each direction x 3 trials each with cga/min assist at times.    NOT TODAY  4 inch step ups with bilat UE support x 10 reps    Access Code: [email or web address]  URL: [email or web address]  Date: 03/24/2025  Prepared by: Deisy Zazueta     Exercises  - Active Straight Leg Raise with Quad Set (Mirrored)  - 1 x daily - 7 x weekly - 2-3 sets - 10 reps - 5 seconds hold  - Standing Hip Abduction (Mirrored)  - 2 x daily - 7 x weekly - 3 sets - 10 reps - 2 sec hold  - Sitting Knee Extension with Resistance (Mirrored)  - 1 x daily - 7 x weekly - 3 sets - 10 reps - 1-2 seconds hold  - Seated Hamstring Curl with Anchored Resistance  - 1 x daily - 7 x weekly - 3 sets - 10 reps - 1-2 seconds hold  - Mini Squat with Counter Support  - 1-2 x daily - 7 x weekly - 3 sets - 10 reps - 1-2 seconds hold  - Side Stepping with Counter Support  - 2 x daily - 7 x weekly - 1 sets - 1 reps - 2 minute bout hold  - Side Plank on Knees  - 1 x daily - 7 x weekly - 1 sets - 10 reps - 5 seconds hold  - Seated Hip Abduction with Resistance  - 1 x daily - 7 x weekly - 3 sets - 10 reps - 5-20 seconds hold    OP EDUCATION:       Goals:  By discharge, pt will meet the following new goals established on 6/27/25:      Pt will perform DGI at or above cutoff for falls risk assessment.  Pt will perform 6MWT with SPC similar to prior episode of care.   Pt will demonstrate ability to perform STS without UE support without LOB or retropulsion.   Pt will perform Short Form RUBIO to 23/28.

## 2025-07-25 ENCOUNTER — APPOINTMENT (OUTPATIENT)
Dept: PHYSICAL THERAPY | Facility: CLINIC | Age: 80
End: 2025-07-25
Payer: MEDICARE

## 2025-07-25 DIAGNOSIS — M25.472 EDEMA OF LEFT ANKLE: ICD-10-CM

## 2025-07-25 DIAGNOSIS — R26.89 BALANCE PROBLEM: ICD-10-CM

## 2025-07-25 DIAGNOSIS — M25.672 ANKLE STIFFNESS, LEFT: Primary | ICD-10-CM

## 2025-07-25 DIAGNOSIS — R29.6 FREQUENT FALLS: ICD-10-CM

## 2025-07-25 DIAGNOSIS — R26.2 DIFFICULTY WALKING: ICD-10-CM

## 2025-07-25 PROCEDURE — 97110 THERAPEUTIC EXERCISES: CPT | Mod: GP,KX

## 2025-07-25 ASSESSMENT — PAIN - FUNCTIONAL ASSESSMENT: PAIN_FUNCTIONAL_ASSESSMENT: 0-10

## 2025-07-25 ASSESSMENT — PAIN SCALES - GENERAL: PAINLEVEL_OUTOF10: 0 - NO PAIN

## 2025-07-27 NOTE — PROGRESS NOTES
Physical Therapy    Physical Therapy Treatment    Patient Name: Zena Baxter  MRN: [id number]  Today's Date: 7/27/2025    Time Entry:                           Visit #59 of 60  2025 [facility] 90 DAY 9/25/2025     Assessment:   Pt tolerated session well without adverse effect and is making some progress toward goals.     Pt increased resistance on knee extension machine by 20# today, and on leg press machine by 5#. She was fatigued by end of session. Pt will continue to benefit from PT to address her LE strength for assistance with transfers as well as to work on her functional mobility and balance.     Plan:    Re-assessment next visit.      Try sit to stands prior to working on Cybex machines. Continue working on cybex machines for LE strengthening.   Continue working on sit to stands, hip ABD strengthening, & balance.    Current Problem  1. Ankle stiffness, left        2. Difficulty walking        3. Edema of left ankle          General   Pt states   Schedule 1 more with me for that to be the re-assessment ***   Work on transitioning her strengthening to independent soon ***   STS at start of session? ***     continues to report noticing improvement in LE strength- had an easier time getting up from the ground. No new falls. Does feel her balance is off today due to dehydration.     Subjective    Precautions     Vital Signs     Pain       Objective     Treatments:  Therapeutic Exercise ([id number]):  57 minutes  Side stepping in // bars using green bungee cord at waist for resistance x 6 laps.    Cybex leg press: seat at 3, footplate at 3.  100# 2x10 reps, 110#  2x10 reps, blue thera band for additional resistance for hip ABD/ER.      Cybex knee extension 55# seat at 3, 3x10 reps    Cybex HS curl: seat at 4, footpad at 3: 55# 2x10 reps; x10 reps with 60#    STS at end of session x3 reps. Pt too fatigued to complete and discontinued    NOT TODAY:   Column walkouts with 5# side stepping in each direction x 3 trials each with  cga/min assist at times.     NOT TODAY  4 inch step ups with bilat UE support x 10 reps    Access Code: [email or web address]  URL: [email or web address]  Date: 03/24/2025  Prepared by: Deisy Zazueta     Exercises  - Active Straight Leg Raise with Quad Set (Mirrored)  - 1 x daily - 7 x weekly - 2-3 sets - 10 reps - 5 seconds hold  - Standing Hip Abduction (Mirrored)  - 2 x daily - 7 x weekly - 3 sets - 10 reps - 2 sec hold  - Sitting Knee Extension with Resistance (Mirrored)  - 1 x daily - 7 x weekly - 3 sets - 10 reps - 1-2 seconds hold  - Seated Hamstring Curl with Anchored Resistance  - 1 x daily - 7 x weekly - 3 sets - 10 reps - 1-2 seconds hold  - Mini Squat with Counter Support  - 1-2 x daily - 7 x weekly - 3 sets - 10 reps - 1-2 seconds hold  - Side Stepping with Counter Support  - 2 x daily - 7 x weekly - 1 sets - 1 reps - 2 minute bout hold  - Side Plank on Knees  - 1 x daily - 7 x weekly - 1 sets - 10 reps - 5 seconds hold  - Seated Hip Abduction with Resistance  - 1 x daily - 7 x weekly - 3 sets - 10 reps - 5-20 seconds hold    OP EDUCATION:       Goals:  By discharge, pt will meet the following new goals established on 6/27/25:      Pt will perform DGI at or above cutoff for falls risk assessment.  Pt will perform 6MWT with SPC similar to prior episode of care.   Pt will demonstrate ability to perform STS without UE support without LOB or retropulsion.   Pt will perform Short Form RUBIO to 23/28.

## 2025-07-28 ENCOUNTER — APPOINTMENT (OUTPATIENT)
Dept: PHYSICAL THERAPY | Facility: CLINIC | Age: 80
End: 2025-07-28
Payer: MEDICARE

## 2025-07-28 DIAGNOSIS — M25.672 ANKLE STIFFNESS, LEFT: Primary | ICD-10-CM

## 2025-07-28 DIAGNOSIS — R26.2 DIFFICULTY WALKING: ICD-10-CM

## 2025-07-28 DIAGNOSIS — M25.472 EDEMA OF LEFT ANKLE: ICD-10-CM

## 2025-07-29 LAB
ALBUMIN SERPL-MCNC: 4.3 G/DL (ref 3.6–5.1)
ALP SERPL-CCNC: 98 U/L (ref 37–153)
ALT SERPL-CCNC: 14 U/L (ref 6–29)
ANION GAP SERPL CALCULATED.4IONS-SCNC: 9 MMOL/L (CALC) (ref 7–17)
AST SERPL-CCNC: 20 U/L (ref 10–35)
BILIRUB SERPL-MCNC: 0.6 MG/DL (ref 0.2–1.2)
BNP SERPL-MCNC: 168 PG/ML
BUN SERPL-MCNC: 23 MG/DL (ref 7–25)
CALCIUM SERPL-MCNC: 9.8 MG/DL (ref 8.6–10.4)
CHLORIDE SERPL-SCNC: 99 MMOL/L (ref 98–110)
CHOLEST SERPL-MCNC: 180 MG/DL
CHOLEST/HDLC SERPL: 3 (CALC)
CO2 SERPL-SCNC: 28 MMOL/L (ref 20–32)
CREAT SERPL-MCNC: 0.81 MG/DL (ref 0.6–1)
EGFRCR SERPLBLD CKD-EPI 2021: 74 ML/MIN/1.73M2
GLUCOSE SERPL-MCNC: 87 MG/DL (ref 65–99)
HDLC SERPL-MCNC: 60 MG/DL
LDLC SERPL CALC-MCNC: 96 MG/DL (CALC)
NONHDLC SERPL-MCNC: 120 MG/DL (CALC)
POTASSIUM SERPL-SCNC: 4.8 MMOL/L (ref 3.5–5.3)
PROT SERPL-MCNC: 7 G/DL (ref 6.1–8.1)
SODIUM SERPL-SCNC: 136 MMOL/L (ref 135–146)
TRIGL SERPL-MCNC: 144 MG/DL

## 2025-07-30 ENCOUNTER — HOSPITAL ENCOUNTER (OUTPATIENT)
Dept: CARDIOLOGY | Facility: HOSPITAL | Age: 80
Discharge: HOME | End: 2025-07-30
Payer: MEDICARE

## 2025-07-30 DIAGNOSIS — I50.31: ICD-10-CM

## 2025-07-30 DIAGNOSIS — I50.30 STAGE C DIASTOLIC HEART FAILURE: ICD-10-CM

## 2025-07-30 PROCEDURE — 93306 TTE W/DOPPLER COMPLETE: CPT | Performed by: INTERNAL MEDICINE

## 2025-07-30 PROCEDURE — 2500000004 HC RX 250 GENERAL PHARMACY W/ HCPCS (ALT 636 FOR OP/ED): Performed by: INTERNAL MEDICINE

## 2025-07-30 PROCEDURE — C8929 TTE W OR WO FOL WCON,DOPPLER: HCPCS

## 2025-07-30 RX ADMIN — PERFLUTREN 2 ML OF DILUTION: 6.52 INJECTION, SUSPENSION INTRAVENOUS at 14:26

## 2025-08-03 LAB
AORTIC VALVE MEAN GRADIENT: 14 MMHG
AORTIC VALVE PEAK VELOCITY: 2.48 M/S
AV PEAK GRADIENT: 25 MMHG
AVA (PEAK VEL): 1.3 CM2
AVA (VTI): 1.17 CM2
EJECTION FRACTION APICAL 4 CHAMBER: 49.6
EJECTION FRACTION: 48 %
LEFT ATRIUM VOLUME AREA LENGTH INDEX BSA: 28.9 ML/M2
LEFT VENTRICLE INTERNAL DIMENSION DIASTOLE: 5.4 CM (ref 3.5–6)
LEFT VENTRICULAR OUTFLOW TRACT DIAMETER: 1.94 CM
LV EJECTION FRACTION BIPLANE: 46 %
MITRAL VALVE E/A RATIO: 0.67
RIGHT VENTRICLE FREE WALL PEAK S': 14.28 CM/S
RIGHT VENTRICLE PEAK SYSTOLIC PRESSURE: 14 MMHG
TRICUSPID ANNULAR PLANE SYSTOLIC EXCURSION: 2.4 CM

## 2025-08-13 ENCOUNTER — APPOINTMENT (OUTPATIENT)
Dept: PHYSICAL THERAPY | Facility: CLINIC | Age: 80
End: 2025-08-13
Payer: MEDICARE

## 2025-08-13 DIAGNOSIS — R29.6 FREQUENT FALLS: ICD-10-CM

## 2025-08-13 DIAGNOSIS — R26.2 DIFFICULTY WALKING: ICD-10-CM

## 2025-08-13 DIAGNOSIS — R26.89 BALANCE PROBLEM: ICD-10-CM

## 2025-08-13 DIAGNOSIS — M25.672 ANKLE STIFFNESS, LEFT: Primary | ICD-10-CM

## 2025-08-13 DIAGNOSIS — M25.472 EDEMA OF LEFT ANKLE: ICD-10-CM

## 2025-08-13 PROCEDURE — 97110 THERAPEUTIC EXERCISES: CPT | Mod: GP,KX

## 2025-08-13 ASSESSMENT — PAIN SCALES - GENERAL: PAINLEVEL_OUTOF10: 0 - NO PAIN

## 2025-08-13 ASSESSMENT — PAIN - FUNCTIONAL ASSESSMENT: PAIN_FUNCTIONAL_ASSESSMENT: 0-10

## 2025-08-21 ENCOUNTER — APPOINTMENT (OUTPATIENT)
Dept: PHYSICAL THERAPY | Facility: CLINIC | Age: 80
End: 2025-08-21
Payer: MEDICARE

## 2025-08-21 DIAGNOSIS — M25.672 ANKLE STIFFNESS, LEFT: Primary | ICD-10-CM

## 2025-08-21 DIAGNOSIS — M25.472 EDEMA OF LEFT ANKLE: ICD-10-CM

## 2025-08-21 DIAGNOSIS — R26.2 DIFFICULTY WALKING: ICD-10-CM

## 2025-08-22 ENCOUNTER — TREATMENT (OUTPATIENT)
Dept: PHYSICAL THERAPY | Facility: CLINIC | Age: 80
End: 2025-08-22
Payer: MEDICARE

## 2025-08-22 DIAGNOSIS — R26.2 DIFFICULTY WALKING: ICD-10-CM

## 2025-08-22 DIAGNOSIS — M25.472 EDEMA OF LEFT ANKLE: ICD-10-CM

## 2025-08-22 DIAGNOSIS — M25.672 ANKLE STIFFNESS, LEFT: Primary | ICD-10-CM

## 2025-08-22 PROCEDURE — 97110 THERAPEUTIC EXERCISES: CPT | Mod: GP,KX

## 2025-08-22 ASSESSMENT — PAIN SCALES - GENERAL: PAINLEVEL_OUTOF10: 0 - NO PAIN

## 2025-08-22 ASSESSMENT — PAIN - FUNCTIONAL ASSESSMENT: PAIN_FUNCTIONAL_ASSESSMENT: 0-10

## 2025-08-26 ENCOUNTER — CLINICAL SUPPORT (OUTPATIENT)
Dept: CARDIAC REHAB | Facility: CLINIC | Age: 80
End: 2025-08-26

## 2025-08-26 PROCEDURE — 9430000001 HC PHASE III CARDIAC REHAB MONTHLY FEE

## 2025-08-31 ENCOUNTER — OFFICE VISIT (OUTPATIENT)
Dept: URGENT CARE | Age: 80
End: 2025-08-31
Payer: MEDICARE

## 2025-08-31 ASSESSMENT — PATIENT HEALTH QUESTIONNAIRE - PHQ9
1. LITTLE INTEREST OR PLEASURE IN DOING THINGS: NOT AT ALL
SUM OF ALL RESPONSES TO PHQ9 QUESTIONS 1 AND 2: 0
2. FEELING DOWN, DEPRESSED OR HOPELESS: NOT AT ALL

## 2025-08-31 ASSESSMENT — ENCOUNTER SYMPTOMS
OCCASIONAL FEELINGS OF UNSTEADINESS: 0
LOSS OF SENSATION IN FEET: 0
DEPRESSION: 0

## 2025-09-16 ENCOUNTER — APPOINTMENT (OUTPATIENT)
Dept: CARDIOLOGY | Facility: CLINIC | Age: 80
End: 2025-09-16
Payer: MEDICARE

## 2025-09-23 ENCOUNTER — APPOINTMENT (OUTPATIENT)
Dept: PHYSICAL THERAPY | Facility: CLINIC | Age: 80
End: 2025-09-23
Payer: MEDICARE

## 2025-09-23 DIAGNOSIS — M25.672 ANKLE STIFFNESS, LEFT: Primary | ICD-10-CM

## 2025-09-23 DIAGNOSIS — M25.472 EDEMA OF LEFT ANKLE: ICD-10-CM

## 2025-09-23 DIAGNOSIS — R26.2 DIFFICULTY WALKING: ICD-10-CM

## (undated) DEVICE — Device

## (undated) DEVICE — SUTURE, CTD, VICRYL, 2-0, UND, BR, CT-2

## (undated) DEVICE — TIP, SUCTION, YANKAUER, BULB, ADULT

## (undated) DEVICE — FLOSEAL, MATRIX, HEMOSTATIC, FULL STERILE PREP, 5ML

## (undated) DEVICE — COVER, TABLE, 54X90

## (undated) DEVICE — SUTURE, MONOCRYL, 3-0, 27 IN, PS-2, UNDYED

## (undated) DEVICE — GLOVE, SURGICAL, PROTEXIS PI MICRO, 7.5, PF, LF

## (undated) DEVICE — BANDAGE, ELASTIC, SELF-CLOSE, 4 IN, HONEYCOMB, STERILE

## (undated) DEVICE — TUBING, SUCTION, CONNECTING, 9/32 X 10FT, LF

## (undated) DEVICE — MARKER, SKIN, DUAL TIP, W/RULER AND LABEL

## (undated) DEVICE — DRESSING, GAUZE, SUPER KERLIX, 6X6

## (undated) DEVICE — SOLUTION, IRRIGATION, SODIUM CHLORIDE 0.9%, 1000 ML, POUR BOTTLE

## (undated) DEVICE — SEALANT, HEMOSTATIC, FLOSEAL, 10 ML

## (undated) DEVICE — SUTURE, VICRYL, 3-0, 27 IN, SH

## (undated) DEVICE — PADDING, WEBRIL, UNDERCAST, STERILE, 4 IN

## (undated) DEVICE — APPLICATOR, CHLORAPREP, W/ORANGE TINT, 26ML

## (undated) DEVICE — SOLUTION, IRRIGATION, STERILE WATER, 1000 ML, POUR BOTTLE

## (undated) DEVICE — SUTURE, ETHILON, 3-0, 30 IN, FS-1, BLACK

## (undated) DEVICE — GLOVE, SURGICAL, PROTEXIS PI , 7.5, PF, LF

## (undated) DEVICE — DRESSING, GAUZE, PETROLATUM, PATCH, XEROFORM, 1 X 8 IN, STERILE

## (undated) DEVICE — SPLINT, SPECIALIST, FAST SETTING, 5 X 45 IN, PLASTER

## (undated) DEVICE — TOWEL PACK, STERILE, 4/PACK, BLUE